# Patient Record
Sex: MALE | Race: ASIAN | NOT HISPANIC OR LATINO | URBAN - METROPOLITAN AREA
[De-identification: names, ages, dates, MRNs, and addresses within clinical notes are randomized per-mention and may not be internally consistent; named-entity substitution may affect disease eponyms.]

---

## 2020-06-28 ENCOUNTER — INPATIENT (INPATIENT)
Facility: HOSPITAL | Age: 65
LOS: 4 days | Discharge: HOME | End: 2020-07-03
Attending: STUDENT IN AN ORGANIZED HEALTH CARE EDUCATION/TRAINING PROGRAM | Admitting: STUDENT IN AN ORGANIZED HEALTH CARE EDUCATION/TRAINING PROGRAM
Payer: COMMERCIAL

## 2020-06-28 VITALS
TEMPERATURE: 99 F | HEART RATE: 62 BPM | SYSTOLIC BLOOD PRESSURE: 120 MMHG | OXYGEN SATURATION: 98 % | RESPIRATION RATE: 16 BRPM | DIASTOLIC BLOOD PRESSURE: 58 MMHG
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
## 2020-06-28 DIAGNOSIS — S06.5X0A TRAUMATIC SUBDURAL HEMORRHAGE WITHOUT LOSS OF CONSCIOUSNESS, INITIAL ENCOUNTER: ICD-10-CM

## 2020-06-28 DIAGNOSIS — K21.9 GASTRO-ESOPHAGEAL REFLUX DISEASE WITHOUT ESOPHAGITIS: ICD-10-CM

## 2020-06-28 DIAGNOSIS — G81.94 HEMIPLEGIA, UNSPECIFIED AFFECTING LEFT NONDOMINANT SIDE: ICD-10-CM

## 2020-06-28 DIAGNOSIS — R00.1 BRADYCARDIA, UNSPECIFIED: ICD-10-CM

## 2020-06-28 DIAGNOSIS — F17.210 NICOTINE DEPENDENCE, CIGARETTES, UNCOMPLICATED: ICD-10-CM

## 2020-06-28 DIAGNOSIS — G89.18 OTHER ACUTE POSTPROCEDURAL PAIN: ICD-10-CM

## 2020-06-28 DIAGNOSIS — G93.89 OTHER SPECIFIED DISORDERS OF BRAIN: ICD-10-CM

## 2020-06-28 DIAGNOSIS — S06.2X0A DIFFUSE TRAUMATIC BRAIN INJURY WITHOUT LOSS OF CONSCIOUSNESS, INITIAL ENCOUNTER: ICD-10-CM
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
## 2020-06-28 DIAGNOSIS — R63.6 UNDERWEIGHT: ICD-10-CM

## 2020-06-28 DIAGNOSIS — J98.11 ATELECTASIS: ICD-10-CM

## 2020-06-28 DIAGNOSIS — R27.0 ATAXIA, UNSPECIFIED: ICD-10-CM

## 2020-06-28 LAB
ALBUMIN SERPL ELPH-MCNC: 4.2 G/DL — SIGNIFICANT CHANGE UP (ref 3.5–5.2)
ALP SERPL-CCNC: 77 U/L — SIGNIFICANT CHANGE UP (ref 30–115)
ALT FLD-CCNC: 8 U/L — SIGNIFICANT CHANGE UP (ref 0–41)
ANION GAP SERPL CALC-SCNC: 12 MMOL/L — SIGNIFICANT CHANGE UP (ref 7–14)
APPEARANCE UR: CLEAR — SIGNIFICANT CHANGE UP
APTT BLD: 30.8 SEC — SIGNIFICANT CHANGE UP (ref 27–39.2)
AST SERPL-CCNC: 18 U/L — SIGNIFICANT CHANGE UP (ref 0–41)
BACTERIA # UR AUTO: 0 — SIGNIFICANT CHANGE UP
BASOPHILS # BLD AUTO: 0.02 K/UL — SIGNIFICANT CHANGE UP (ref 0–0.2)
BASOPHILS NFR BLD AUTO: 0.3 % — SIGNIFICANT CHANGE UP (ref 0–1)
BILIRUB SERPL-MCNC: 0.4 MG/DL — SIGNIFICANT CHANGE UP (ref 0.2–1.2)
BILIRUB UR-MCNC: NEGATIVE — SIGNIFICANT CHANGE UP
BLD GP AB SCN SERPL QL: SIGNIFICANT CHANGE UP
BUN SERPL-MCNC: 22 MG/DL — HIGH (ref 10–20)
CALCIUM SERPL-MCNC: 9.1 MG/DL — SIGNIFICANT CHANGE UP (ref 8.5–10.1)
CHLORIDE SERPL-SCNC: 106 MMOL/L — SIGNIFICANT CHANGE UP (ref 98–110)
CO2 SERPL-SCNC: 24 MMOL/L — SIGNIFICANT CHANGE UP (ref 17–32)
COLOR SPEC: SIGNIFICANT CHANGE UP
CREAT SERPL-MCNC: 0.8 MG/DL — SIGNIFICANT CHANGE UP (ref 0.7–1.5)
DIFF PNL FLD: NEGATIVE — SIGNIFICANT CHANGE UP
EOSINOPHIL # BLD AUTO: 0.06 K/UL — SIGNIFICANT CHANGE UP (ref 0–0.7)
EOSINOPHIL NFR BLD AUTO: 0.9 % — SIGNIFICANT CHANGE UP (ref 0–8)
EPI CELLS # UR: 0 /HPF — SIGNIFICANT CHANGE UP (ref 0–5)
GLUCOSE SERPL-MCNC: 109 MG/DL — HIGH (ref 70–99)
GLUCOSE UR QL: NEGATIVE — SIGNIFICANT CHANGE UP
HCT VFR BLD CALC: 43.1 % — SIGNIFICANT CHANGE UP (ref 42–52)
HGB BLD-MCNC: 14.7 G/DL — SIGNIFICANT CHANGE UP (ref 14–18)
HYALINE CASTS # UR AUTO: 0 /LPF — SIGNIFICANT CHANGE UP (ref 0–7)
IMM GRANULOCYTES NFR BLD AUTO: 0.2 % — SIGNIFICANT CHANGE UP (ref 0.1–0.3)
INR BLD: 1 RATIO — SIGNIFICANT CHANGE UP (ref 0.65–1.3)
KETONES UR-MCNC: NEGATIVE — SIGNIFICANT CHANGE UP
LEUKOCYTE ESTERASE UR-ACNC: NEGATIVE — SIGNIFICANT CHANGE UP
LYMPHOCYTES # BLD AUTO: 0.74 K/UL — LOW (ref 1.2–3.4)
LYMPHOCYTES # BLD AUTO: 11.3 % — LOW (ref 20.5–51.1)
MCHC RBC-ENTMCNC: 32.7 PG — HIGH (ref 27–31)
MCHC RBC-ENTMCNC: 34.1 G/DL — SIGNIFICANT CHANGE UP (ref 32–37)
MCV RBC AUTO: 95.8 FL — HIGH (ref 80–94)
MONOCYTES # BLD AUTO: 0.65 K/UL — HIGH (ref 0.1–0.6)
MONOCYTES NFR BLD AUTO: 10 % — HIGH (ref 1.7–9.3)
NEUTROPHILS # BLD AUTO: 5.04 K/UL — SIGNIFICANT CHANGE UP (ref 1.4–6.5)
NEUTROPHILS NFR BLD AUTO: 77.3 % — HIGH (ref 42.2–75.2)
NITRITE UR-MCNC: NEGATIVE — SIGNIFICANT CHANGE UP
NRBC # BLD: 0 /100 WBCS — SIGNIFICANT CHANGE UP (ref 0–0)
PH UR: 7 — SIGNIFICANT CHANGE UP (ref 5–8)
PLATELET # BLD AUTO: 281 K/UL — SIGNIFICANT CHANGE UP (ref 130–400)
POTASSIUM SERPL-MCNC: 4.1 MMOL/L — SIGNIFICANT CHANGE UP (ref 3.5–5)
POTASSIUM SERPL-SCNC: 4.1 MMOL/L — SIGNIFICANT CHANGE UP (ref 3.5–5)
PROT SERPL-MCNC: 6.8 G/DL — SIGNIFICANT CHANGE UP (ref 6–8)
PROT UR-MCNC: ABNORMAL
PROTHROM AB SERPL-ACNC: 11.5 SEC — SIGNIFICANT CHANGE UP (ref 9.95–12.87)
RBC # BLD: 4.5 M/UL — LOW (ref 4.7–6.1)
RBC # FLD: 14.4 % — SIGNIFICANT CHANGE UP (ref 11.5–14.5)
RBC CASTS # UR COMP ASSIST: 2 /HPF — SIGNIFICANT CHANGE UP (ref 0–4)
SARS-COV-2 RNA SPEC QL NAA+PROBE: SIGNIFICANT CHANGE UP
SODIUM SERPL-SCNC: 142 MMOL/L — SIGNIFICANT CHANGE UP (ref 135–146)
SP GR SPEC: >1.05 (ref 1.01–1.02)
TROPONIN T SERPL-MCNC: <0.01 NG/ML — SIGNIFICANT CHANGE UP
UROBILINOGEN FLD QL: SIGNIFICANT CHANGE UP
WBC # BLD: 6.52 K/UL — SIGNIFICANT CHANGE UP (ref 4.8–10.8)
WBC # FLD AUTO: 6.52 K/UL — SIGNIFICANT CHANGE UP (ref 4.8–10.8)
WBC UR QL: 0 /HPF — SIGNIFICANT CHANGE UP (ref 0–5)

## 2020-06-28 PROCEDURE — 70450 CT HEAD/BRAIN W/O DYE: CPT | Mod: 26,59

## 2020-06-28 PROCEDURE — 93010 ELECTROCARDIOGRAM REPORT: CPT | Mod: 77

## 2020-06-28 PROCEDURE — 93010 ELECTROCARDIOGRAM REPORT: CPT

## 2020-06-28 PROCEDURE — 70498 CT ANGIOGRAPHY NECK: CPT | Mod: 26

## 2020-06-28 PROCEDURE — 71045 X-RAY EXAM CHEST 1 VIEW: CPT | Mod: 26

## 2020-06-28 PROCEDURE — 70496 CT ANGIOGRAPHY HEAD: CPT | Mod: 26

## 2020-06-28 PROCEDURE — 99291 CRITICAL CARE FIRST HOUR: CPT

## 2020-06-28 RX ORDER — PANTOPRAZOLE SODIUM 20 MG/1
40 TABLET, DELAYED RELEASE ORAL
Refills: 0 | Status: DISCONTINUED | OUTPATIENT
Start: 2020-06-28 | End: 2020-06-29

## 2020-06-28 RX ORDER — CHLORHEXIDINE GLUCONATE 213 G/1000ML
1 SOLUTION TOPICAL
Refills: 0 | Status: DISCONTINUED | OUTPATIENT
Start: 2020-06-28 | End: 2020-06-29

## 2020-06-28 RX ORDER — LEVETIRACETAM 250 MG/1
750 TABLET, FILM COATED ORAL EVERY 12 HOURS
Refills: 0 | Status: DISCONTINUED | OUTPATIENT
Start: 2020-06-28 | End: 2020-06-29

## 2020-06-28 RX ADMIN — LEVETIRACETAM 400 MILLIGRAM(S): 250 TABLET, FILM COATED ORAL at 19:33

## 2020-06-28 NOTE — ED PROVIDER NOTE - OBJECTIVE STATEMENT
64yo M with PMH of GERD and tobacco abuse presenting to ED with about 2-3 days of L sided weakness associated with unbalanced/ataxic ambulation and slowed speech, worsened today, not on AC. 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 Had a mild occipital/posterior HA although no pain currently. No f/c, vision changes, neck pain/stiffness, dizziness, syncope, recent sickness, CP, SOB, abdominal pain, extremity pain, or paresthesias. Son states patient has also had weight loss and decreased appetite the last few days. No alcohol/drug use.

## 2020-06-28 NOTE — CONSULT NOTE ADULT - SUBJECTIVE AND OBJECTIVE BOX
HISTORY OF PRESENT ILLNESS: 66 y/o Cantonese speaking w pmhx of GERD, (+) smoker, no anticoagulant use presents with ~ 2- 3 days of L sided weakness associated with unbalanced ambulation and slowed speech, worsened today. Son also reports pt has had weight loss and decreased appetite. No fever, chills, n/v, cp, sob, pleuritic cp, palpitations, diaphoresis, cough, tinnitus, ear pain, hearing loss, neck pain/stiffness, back pain, photophobia/phonophobia, blurry vision/visual changes, abd pain, diarrhea, constipation, melena/brbpr, urinary symptoms, numbness/tingling, HA, Lh/dizziness, syncope, sick contacts, recent travel or rash. Never had anything like this before, no history of trauma or high blood pressure.     PAST MEDICAL & SURGICAL HISTORY:    FAMILY HISTORY:    Allergies : No Known Allergies    Intolerances      REVIEW OF SYSTEMS : all systems reviewed negative   MEDICATIONS:  Antibiotics:    Neuro:  levETIRAcetam  IVPB 750 milliGRAM(s) IV Intermittent every 12 hours    Anticoagulation: none     Vital Signs Last 24 Hrs  T(C): 37.1 (28 Jun 2020 17:03), Max: 37.1 (28 Jun 2020 17:03)  T(F): 98.8 (28 Jun 2020 17:03), Max: 98.8 (28 Jun 2020 17:03)  HR: 54 (28 Jun 2020 18:27) (54 - 62)  BP: 128/69 (28 Jun 2020 18:27) (120/58 - 128/69)  BP(mean): --  RR: 16 (28 Jun 2020 18:27) (16 - 16)  SpO2: 98% (28 Jun 2020 18:27) (98% - 98%)    Physical Exam :  General : Pt sitting upright in ED with son translating.   A&O x 3 oriented to person place and time   Tongue midline  Facial features symmetric, No droop  Cantonese speaking, Speech clear no slur   Pt speaking in full sentences   Follows all commands   Occular :   Right  pupil slightly larger than left  Right pupil more sluggish than left   EOMI, no gaze palsy   VA intact no diplopia as per pt   Motor :   MAEx4 b/l  strength 5-/5 LUE & LLE  strength 5/5 RUE & RLE   Shoulder shrug intact   Sensory :  Intact bilaterally UE & LE & face   Cerebellar :  AMANDA intact   Finger to nose intact b/l  Pronator Drift : no drift noted on exam     LABS:                        14.7   6.52  )-----------( 281      ( 28 Jun 2020 17:40 )             43.1     06-28    142  |  106  |  22<H>  ----------------------------<  109<H>  4.1   |  24  |  0.8    Ca    9.1      28 Jun 2020 17:40    TPro  6.8  /  Alb  4.2  /  TBili  0.4  /  DBili  x   /  AST  18  /  ALT  8   /  AlkPhos  77  06-28    PT/INR - ( 28 Jun 2020 17:40 )   PT: 11.50 sec;   INR: 1.00 ratio         PTT - ( 28 Jun 2020 17:40 )  PTT:30.8 sec    RADIOLOGY & ADDITIONAL STUDIES:   < from: CT Head No Cont (06.28.20 @ 17:55)  Findings:    Large concave isodensity (measuring approximately 2.5 cm in diameter) in the right subdural region with compression of the right brain and lateral ventricle, causing approximately 1.6 cm midline shift to the left (series 3 image 17). There are focal hyperdensities within the concave isodensity, likely representing acute on subacute hemorrhage.      Assessment: 66 yo Cantonese speaking man no AC use presents to ED with stroke symptoms x 2-3 days. Ct shows 2.5 cm SDH with compression of R brain and 1.6 cm MLS. Good PE, pt reports HA that has resolved and slight left sided weakness.     Plan:  - Scan reviewed with attending   - admit to ICU for q1 neuro checks   - Start Keppra 750 BID   - repeat CT in am  - OR tomorrow for R mishel hole possible crani for evac of SDH   - hold all AC   - no dextrose products   - STAT head CT if AMS   - BP goal 140 - 150 SBP   - repeat labs in AM HISTORY OF PRESENT ILLNESS: 66 y/o Cantonese speaking w pmhx of GERD, (+) smoker, no anticoagulant use presents with ~ 2- 3 days of L sided weakness associated with unbalanced ambulation and slowed speech, worsened today. Son also reports pt has had weight loss and decreased appetite. No fever, chills, n/v, cp, sob, pleuritic cp, palpitations, diaphoresis, cough, tinnitus, ear pain, hearing loss, neck pain/stiffness, back pain, photophobia/phonophobia, blurry vision/visual changes, abd pain, diarrhea, constipation, melena/brbpr, urinary symptoms, numbness/tingling, HA, Lh/dizziness, syncope, sick contacts, recent travel or rash. Never had anything like this before, no history of trauma or high blood pressure.     PAST MEDICAL & SURGICAL HISTORY:    FAMILY HISTORY:    Allergies : No Known Allergies    REVIEW OF SYSTEMS : all other systems reviewed negative   MEDICATIONS:  Antibiotics:    Neuro:  levETIRAcetam  IVPB 750 milliGRAM(s) IV Intermittent every 12 hours    Anticoagulation: none     Vital Signs Last 24 Hrs  T(C): 37.1 (28 Jun 2020 17:03), Max: 37.1 (28 Jun 2020 17:03)  T(F): 98.8 (28 Jun 2020 17:03), Max: 98.8 (28 Jun 2020 17:03)  HR: 54 (28 Jun 2020 18:27) (54 - 62)  BP: 128/69 (28 Jun 2020 18:27) (120/58 - 128/69)  BP(mean): --  RR: 16 (28 Jun 2020 18:27) (16 - 16)  SpO2: 98% (28 Jun 2020 18:27) (98% - 98%)    Physical Exam :  General : Pt sitting upright in ED with son translating.   A&O x 3 oriented to person place and time   Tongue midline  Facial features symmetric, No droop  Cantonese speaking, Speech clear no slur   Pt speaking in full sentences   Follows all commands   Occular :   Right  pupil slightly larger than left  Right pupil more sluggish than left   EOMI, no gaze palsy   VA intact no diplopia as per pt   Motor :   MAEx4 b/l  strength 5-/5 LUE & LLE  strength 5/5 RUE & RLE   Shoulder shrug intact   Sensory :  Intact bilaterally UE & LE & face   Cerebellar :  AMANDA intact   Finger to nose intact b/l  Pronator Drift : no drift noted on exam     LABS:                        14.7   6.52  )-----------( 281      ( 28 Jun 2020 17:40 )             43.1     06-28    142  |  106  |  22<H>  ----------------------------<  109<H>  4.1   |  24  |  0.8    Ca    9.1      28 Jun 2020 17:40    TPro  6.8  /  Alb  4.2  /  TBili  0.4  /  DBili  x   /  AST  18  /  ALT  8   /  AlkPhos  77  06-28    PT/INR - ( 28 Jun 2020 17:40 )   PT: 11.50 sec;   INR: 1.00 ratio         PTT - ( 28 Jun 2020 17:40 )  PTT:30.8 sec    RADIOLOGY & ADDITIONAL STUDIES:   < from: CT Head No Cont (06.28.20 @ 17:55)  Findings:    Large concave isodensity (measuring approximately 2.5 cm in diameter) in the right subdural region with compression of the right brain and lateral ventricle, causing approximately 1.6 cm midline shift to the left (series 3 image 17). There are focal hyperdensities within the concave isodensity, likely representing acute on subacute hemorrhage.    Assessment: 64 yo Cantonese speaking man no AC use presents to ED with stroke symptoms x 2-3 days. Ct shows 2.5 cm SDH with compression of R brain and 1.6 cm MLS. Good PE, pt reports HA that has resolved and slight left sided weakness.     Plan:  - Scan reviewed with attending   - admit to ICU for q1 neuro checks   - Start Keppra 750 BID   - repeat CT in am  - OR tomorrow for R mishel hole possible crani for evac of SDH ; pt will need covid swab prior to OR   - hold all AC   - no dextrose products   - STAT head CT if AMS    - BP goal 140 - 150 SBP   - repeat labs in AM

## 2020-06-28 NOTE — ED ADULT NURSE NOTE - NSIMPLEMENTINTERV_GEN_ALL_ED
Implemented All Fall with Harm Risk Interventions:  [place] to call system. Call bell, personal items and telephone within reach. Instruct patient to call for assistance. Room bathroom lighting operational. Non-slip footwear when patient is off stretcher. Physically safe environment: no spills, clutter or unnecessary equipment. Stretcher in lowest position, wheels locked, appropriate side rails in place. Provide visual cue, wrist band, yellow gown, etc. Monitor gait and stability. Monitor for mental status changes and reorient to person, place, and time. Review medications for side effects contributing to fall risk. Reinforce activity limits and safety measures with patient and family. Provide visual clues: red socks.

## 2020-06-28 NOTE — ED PROVIDER NOTE - NS ED ROS FT
Constitutional:  No fevers or chills.  Eyes:  No visual changes, eye pain, or discharge.  ENT:  No hearing changes. No sore throat.  Neck:  No neck pain or stiffness.  Cardiac:  No CP or edema.  Resp:  No cough or SOB.   GI:  No nausea, vomiting, diarrhea, or abdominal pain.  :  No dysuria, frequency, or hematuria.  MSK:  No myalgias or joint pain/swelling.  Neuro:  No current headache/dizziness. +Weakness.  Skin:  No skin rash.

## 2020-06-28 NOTE — H&P ADULT - HISTORY OF PRESENT ILLNESS
This is a 65 year old male Cantonese speaking with PMHx of GERD not on any medications who presented with left sided weakness associated with off balanced gait for 2-3 days. The son also reports pt has had weight loss and decreased appetite. Of note in talking with the son the patient admits to hitting his head about one week ago but reported no loss of conscious with it. On the day of presentation he was having worsening left sided weakness with ataxia and unbalanced gait.     In the ED /58, HR 62, T98.8, sat 98% on room air. Initial NIHSS reported 2 but was 0 on my examination. CT head noted for 2.5cm right subdural isodensity representing subacute subdural hematoma with 1.6cm midline shift to the left. Noted acute on subacute hemorrhage with noted focal hyerdensities within the subdural collection. Evaluated by Neurosurgery who advised plan for mishel hole tomorrow with possible craniotomy and ICU monitoring for q1 neurochecks.

## 2020-06-28 NOTE — H&P ADULT - NSHPLABSRESULTS_GEN_ALL_CORE
14.7   6.52  )-----------( 281      ( 28 Jun 2020 17:40 )             43.1     06-28    142  |  106  |  22<H>  ----------------------------<  109<H>  4.1   |  24  |  0.8    Ca    9.1      28 Jun 2020 17:40    TPro  6.8  /  Alb  4.2  /  TBili  0.4  /  DBili  x   /  AST  18  /  ALT  8   /  AlkPhos  77  06-28    PT/INR - ( 28 Jun 2020 17:40 )   PT: 11.50 sec;   INR: 1.00 ratio       PTT - ( 28 Jun 2020 17:40 )  PTT:30.8 sec    CARDIAC MARKERS ( 28 Jun 2020 17:40 )  x     / <0.01 ng/mL / x     / x     / x        CAPILLARY BLOOD GLUCOSE  POCT Blood Glucose.: 108 mg/dL (28 Jun 2020 17:31)    CT Head No Cont (06.28.20 @ 17:55)    IMPRESSION:   2.5 cm right subdural isodensity, representing subacute subdural hematoma with 1.6 cm midline shift to the left. Focal hyperdensities within the subdural collection, likely representing acute on subacute hemorrhage. A call back request was submitted    CT Angio Head w/ IV Cont (06.28.20 @ 18:10)    IMPRESSION:   No CT evidence of acute intracerebral large vessel filling defect. No intracerebral artery aneurysms.    Partially imaged bilateral lung centrilobular and paraseptal emphysematous change    EKG: NSR HR 50s

## 2020-06-28 NOTE — ED PROVIDER NOTE - CARE PLAN
Assessment and plan of treatment:	Plan: Monitor, FS, CT head, EKG, CXR, labs, neurology consult, reassess. Principal Discharge DX:	Subdural hematoma  Assessment and plan of treatment:	Plan: Monitor, FS, CT head, EKG, CXR, labs, neurology consult, reassess.  Secondary Diagnosis:	Weakness  Secondary Diagnosis:	Ataxia

## 2020-06-28 NOTE — H&P ADULT - NSHPREVIEWOFSYSTEMS_GEN_ALL_CORE
General: No fevers, chills, weight changes	  Skin/Breast: No skin rashes or wounds  Ophthalmologic: No blurry vision, double vision, recent changes in vision  ENMT: No difficulty hearing, ringing in ears, nasal discharge, throat pain, difficulty swallowing  Respiratory and Thorax: No coughing, wheezing, shortness of breath  Cardiovascular: No chest pain, palpitations  Gastrointestinal: No abdominal pain, constipation, diarrhea, nausea, vomiting  Genitourinary: No dysuria, polyuria, pyuria, hematuria  Musculoskeletal: No muscle aches or joint aches  Neurological: No numbness or tingling  Psychiatric: Regular mood  Hematology/Lymphatics: No easy bruising	  Endocrine: No hot or cold intolerance

## 2020-06-28 NOTE — ED ADULT NURSE NOTE - OBJECTIVE STATEMENT
Patient c/o left sided weakness, abnormal gait and weakness x 3 days. Patient brought in by family for further evaluation. Patient A&Ox3, PERRL. No s/s of distress noted. Left sided leg weakness noted. Denies nausea/vomiting/fever/chills/SOB/CP at this time.

## 2020-06-28 NOTE — ED PROVIDER NOTE - ATTENDING CONTRIBUTION TO CARE
Son at bedside translating as pt Cantonese speaking as pt prefers this.   66 y/o w pmhx of GRD, tobacco use, presents with ~ 2- 3 days of L sided weakness associated with unbalanced ambulation and slowed speech, worsened today. 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 not on AC. son also reports pt has had weight loss and decreased appetite. son visits on weekends and when he came this weekend brought him to ed. no fever, chills, n/v, cp, sob, pleuritic cp, palpitations, diaphoresis, cough, tinnitus, ear pain, hearing loss, neck pain/stiffness, back pain, photophobia/phonophobia, blurry vision/visual changes, abd pain, diarrhea, constipation, melena/brbpr, urinary symptoms, numbness/tingling, HA, Lh/dizziness, syncope, sick contacts, recent travel or rash. never had anything like this before.     on exam:   Constitutional: male pt sitting on stretcher in nad.  Skin: no rash, no signs of trauma:  HEENT: PERRL, EOM intact, no nystagmus. mmm. no tongue deviation.   NECK and BACK: neck supple, no spinous ttp to neck or back, FROM, no palpable shelves or step offs, no meningeal signs.  CARDIO: regular rate, radial pulses 2/4 b/l, dp and pt pulses 2/4 b/l.  Lungs: Ctabl w/ breath sounds present b/l, no wheezing or crackles, no accessory muscle use, no tachypnea, no stridor  ABD: BS present throughout all 4 quadrants, abd soft, nd, nt, no rebound tenderness or guarding, no cvat,;  EXT: FROM of upper and lower ext, mild drift to LUE and LLE, does not hit the bed, no calf pain/swelling/erythema.  NEURO: AAOx3. When speaking pt is able to understand and answer questions but is delayed and slowed in responding. Motor 4/5 and 3/5 to LUE and LLE respectfully. Motor 5/5 to RUE and RLE. Sensation intact throughout upper and lower ext. CN II-XII intact. No facial droop or slurring of speech. no dysmetria w/ ftn or rapid alternating fine movements. NIH 1. Son at bedside translating as pt Cantonese speaking as pt prefers this.   66 y/o w pmhx of GERD, (+) smoker, presents with ~ 2- 3 days of L sided weakness associated with unbalanced ambulation and slowed speech, worsened today. 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 not on AC. son also reports pt has had weight loss and decreased appetite. son visits on weekends and when he came this weekend brought him to ed. no fever, chills, n/v, cp, sob, pleuritic cp, palpitations, diaphoresis, cough, tinnitus, ear pain, hearing loss, neck pain/stiffness, back pain, photophobia/phonophobia, blurry vision/visual changes, abd pain, diarrhea, constipation, melena/brbpr, urinary symptoms, numbness/tingling, HA, Lh/dizziness, syncope, sick contacts, recent travel or rash. never had anything like this before.     on exam:   Constitutional: male pt sitting on stretcher in nad.  Skin: no rash, no signs of trauma:  HEENT: PERRL, EOM intact, no nystagmus. mmm. no tongue deviation.   NECK and BACK: neck supple, no spinous ttp to neck or back, FROM, no palpable shelves or step offs, no meningeal signs.  CARDIO: regular rate, radial pulses 2/4 b/l, dp and pt pulses 2/4 b/l.  Lungs: Ctabl w/ breath sounds present b/l, no wheezing or crackles, no accessory muscle use, no tachypnea, no stridor  ABD: BS present throughout all 4 quadrants, abd soft, nd, nt, no rebound tenderness or guarding, no cvat,;  EXT: FROM of upper and lower ext, mild drift to LUE and LLE, does not hit the bed, no calf pain/swelling/erythema.  NEURO: AAOx3. When speaking pt is able to understand and answer questions but is delayed and slowed in responding. Motor 4/5 and 3/5 to LUE and LLE respectfully. Motor 5/5 to RUE and RLE. Sensation intact throughout upper and lower ext. CN II-XII intact. No facial droop or slurring of speech. no dysmetria w/ ftn or rapid alternating fine movements. NIH 1.

## 2020-06-28 NOTE — H&P ADULT - NSHPPHYSICALEXAM_GEN_ALL_CORE
T(C): 36.9 (06-28-20 @ 19:35), Max: 37.1 (06-28-20 @ 17:03)  HR: 48 (06-28-20 @ 19:35) (48 - 62)  BP: 144/67 (06-28-20 @ 19:35) (120/58 - 144/67)  RR: 18 (06-28-20 @ 19:35) (16 - 18)  SpO2: 99% (06-28-20 @ 19:35) (98% - 99%)    PHYSICAL EXAM:  GENERAL: NAD, well-developed, elderly appearing male   HEAD:  Atraumatic, Normocephalic  EYES: EOMI, PERRLA, conjunctiva and sclera clear  ENT:No nasal obstruction or discharge. No tonsillar exudate, swelling or erythema.  NECK: Supple, No JVD  CHEST/LUNG: Clear to auscultation bilaterally; No wheeze  HEART: Regular rate and rhythm; No murmurs, rubs, or gallops  ABDOMEN: Soft, Nontender, Nondistended; Bowel sounds present  EXTREMITIES:  2+ Peripheral Pulses, No clubbing, cyanosis, or edema  PSYCH: AAOx3  NEUROLOGY: non-focal, NIHSS 0  SKIN: No rashes or lesions

## 2020-06-28 NOTE — ED PROVIDER NOTE - CLINICAL SUMMARY MEDICAL DECISION MAKING FREE TEXT BOX
pt and son aware of all labs and imaging, neurosurgery evaluated pt, keppra ordered as per recommendations, ICU aware of pt, approved for ICU, pt on monitor, understands admission.

## 2020-06-28 NOTE — ED PROVIDER NOTE - PHYSICAL EXAMINATION
PHYSICAL EXAM: I have reviewed current vital signs.  GENERAL: NAD, well-nourished; well-developed.  HEAD:  Normocephalic, atraumatic.  EYES: EOMI, PERRL, conjunctiva and sclera clear.  ENT: MMM, no erythema/exudates.  NECK: Supple, FROM, no rigidity.  CHEST/LUNG: Clear to auscultation bilaterally; no wheezes, rales, or rhonchi.  HEART: Regular rate and rhythm, normal S1 and S2; no murmurs, rubs, or gallops.  ABDOMEN: Soft, nontender, nondistended.  EXTREMITIES:  2+ peripheral pulses; FROM.  NEUROLOGY: A&O x 3. Motor 5/5. Sensory intact. No focal neurological deficits. CN II - XII intact. NIHSS 1. LUE 4/5 strength, LLE 3/5 strength, RUE/RLE- 5/5 strength. No pronator drift or facial droop.  SKIN: Warm and dry.

## 2020-06-28 NOTE — H&P ADULT - ASSESSMENT
This is a 65 year old male Cantonese speaking with PMHx of GERD not on any medications who presented with left sided weakness associated with off balanced gait for 2-3 days.    #Left sided weakness and ataxia secondary to Subdural hematoma   - Noted 2.5cm SDH on CT with compression of Right brain and 1.6cm midline shift  - NIHSS 0 right now   - Neurosurgery following; plan for OR tomorrow for R mishel hole and possible crani for evacuation of SDH  - CTH to be repeated in the AM  - NPO at midnight  - Keppra 750mg BID   - NO anticoagulation   - NO dextrose containing products  - BP goal: -150  - COVID-19 low suspicion; swab sent   - [facility] at 30 degress  - Neuro checks q1 hour    #GERD  - Not on any medications at home  - Start on Prtotonix    Activity: As tolerated  Diet: NPO for procedure  DVT ppx: Sequentials  GI ppx: Protonix  Code Status: Full Code  DISPO: From home; admit to ICU This is a 65 year old male Cantonese speaking with PMHx of GERD not on any medications who presented with left sided weakness associated with off balanced gait for 2-3 days.    #Left sided weakness and ataxia secondary to Subdural hematoma   - Noted 2.5cm SDH on CT with compression of Right brain and 1.6cm midline shift  - NIHSS 0 right now   - Neurosurgery following; plan for OR tomorrow for R mishel hole and possible crani for evacuation of SDH  - CTH to be repeated in the AM or sooner if patient's mental status changes  - NPO at midnight  - Keppra 750mg BID   - NO anticoagulation   - NO dextrose containing products  - BP goal: -150  - COVID-19 low suspicion; swab sent   - HOB at 30 degress  - Neuro checks q1 hour    #GERD  - Not on any medications at home  - Start on Prtotonix    Activity: As tolerated  Diet: NPO for procedure  DVT ppx: Sequentials  GI ppx: Protonix  Code Status: Full Code  DISPO: From home; admit to ICU

## 2020-06-28 NOTE — PROGRESS NOTE ADULT - SUBJECTIVE AND OBJECTIVE BOX
Preoperative Note   Provider Specialty:  Neurosurgery.    HISTORY OF PRESENT ILLNESS: 66 y/o Cantonese speaking w pmhx of GERD, (+) smoker, no anticoagulant use presents with ~ 2- 3 days of L sided weakness associated with unbalanced ambulation and slowed speech, worsened today. Son also reports pt has had weight loss and decreased appetite. No fever, chills, n/v, cp, sob, pleuritic cp, palpitations, diaphoresis, cough, tinnitus, ear pain, hearing loss, neck pain/stiffness, back pain, photophobia/phonophobia, blurry vision/visual changes, abd pain, diarrhea, constipation, melena/brbpr, urinary symptoms, numbness/tingling, HA, Lh/dizziness, syncope, sick contacts, recent travel or rash. Never had anything like this before, no history of trauma or high blood pressure.     PAST MEDICAL & SURGICAL HISTORY: GERD     FAMILY HISTORY: no known family history   Allergies : No Known Allergies    REVIEW OF SYSTEMS : all other systems reviewed negative   MEDICATIONS:  Antibiotics:    Neuro:  levETIRAcetam  IVPB 750 milliGRAM(s) IV Intermittent every 12 hours    Anticoagulation: none     Vital Signs Last 24 Hrs  T(C): 37.1 (28 Jun 2020 17:03), Max: 37.1 (28 Jun 2020 17:03)  T(F): 98.8 (28 Jun 2020 17:03), Max: 98.8 (28 Jun 2020 17:03)  HR: 54 (28 Jun 2020 18:27) (54 - 62)  BP: 128/69 (28 Jun 2020 18:27) (120/58 - 128/69)  BP(mean): --  RR: 16 (28 Jun 2020 18:27) (16 - 16)  SpO2: 98% (28 Jun 2020 18:27) (98% - 98%)    Physical Exam :  General : Pt sitting upright in ED with son translating.   A&O x 3 oriented to person place and time   Tongue midline  Facial features symmetric, No droop  Cantonese speaking, Speech clear no slur   Pt speaking in full sentences   Follows all commands   Occular :   Right  pupil slightly larger than left  Right pupil more sluggish than left   EOMI, no gaze palsy   VA intact no diplopia as per pt   Motor :   MAEx4 b/l  strength 5-/5 LUE & LLE  strength 5/5 RUE & RLE   Shoulder shrug intact   Sensory :  Intact bilaterally UE & LE & face   Cerebellar :  AMANDA intact   Finger to nose intact b/l  Pronator Drift : no drift noted on exam     LABS:  CBC :                 14.7   6.52  )-----------( 281                43.1     142  |  106  |  22<H>  ----------------------------<  109<H>  4.1   |  24  |  0.8    Coags :  PT: 11.50 sec   INR: 1.00 ratio    PTT:30.8 sec    CXR : pending read     EKG :CARDIAC MARKERS <0.01 ng/mL     Type + Screen : ABO RH Interpretation: B POS    COVID swab : pending     Assessment: 66 yo Cantonese speaking man no AC use presents to ED with stroke symptoms x 2-3 days. Ct shows 2.5 cm SDH with compression of R brain and 1.6 cm MLS. Good PE, pt reports HA that has resolved and slight left sided weakness.     Plan:  - Scan reviewed with attending   -  ICU for q1 neuro checks   - NPO @ midnight   -  Keppra 750 BID   - repeat CT in am  - OR tomorrow for R mishel hole possible crani for evac of SDH ; pt will need covid swab prior to OR   - hold all AC   - no dextrose products   - STAT head CT if AMS    - BP goal 140 - 150 SBP   - repeat labs in AM

## 2020-06-28 NOTE — ED PROVIDER NOTE - PROGRESS NOTE DETAILS
ED Attending MAGDA Davalos  Pt and son aware of current labs and imaging,  repeat exam NIH 2, no change in exam, no current HA, understand CT, HOB elevated, on Monitor, Neurosurgery aware of pt, will come evaluate, critical care lead made aware of moving pt to critical care area of ed as well. Pennie- Dr. Comer approved patient for ICU, attending- Dr. Menjivar. Patient is hemodynamically stable, HOB at 30 degrees, A&Ox3. NSG recommends to give patient Keppra 750mg BID, NPO after midnight, would like repeat CTH in the AM. Attempted to signout to ICU resident however in the middle of a procedure. Signed out to Dr. Tee, ICU resident. Patient's son Mr. Gil Delgadillo's # [phone].

## 2020-06-29 ENCOUNTER — RESULT REVIEW (OUTPATIENT)
Age: 65
End: 2020-06-29

## 2020-06-29 LAB
A1C WITH ESTIMATED AVERAGE GLUCOSE RESULT: 5.6 % — SIGNIFICANT CHANGE UP (ref 4–5.6)
ANION GAP SERPL CALC-SCNC: 10 MMOL/L — SIGNIFICANT CHANGE UP (ref 7–14)
ANION GAP SERPL CALC-SCNC: 11 MMOL/L — SIGNIFICANT CHANGE UP (ref 7–14)
APTT BLD: 37.1 SEC — SIGNIFICANT CHANGE UP (ref 27–39.2)
BASOPHILS # BLD AUTO: 0.02 K/UL — SIGNIFICANT CHANGE UP (ref 0–0.2)
BASOPHILS # BLD AUTO: 0.04 K/UL — SIGNIFICANT CHANGE UP (ref 0–0.2)
BASOPHILS NFR BLD AUTO: 0.2 % — SIGNIFICANT CHANGE UP (ref 0–1)
BASOPHILS NFR BLD AUTO: 0.5 % — SIGNIFICANT CHANGE UP (ref 0–1)
BUN SERPL-MCNC: 19 MG/DL — SIGNIFICANT CHANGE UP (ref 10–20)
BUN SERPL-MCNC: 20 MG/DL — SIGNIFICANT CHANGE UP (ref 10–20)
CALCIUM SERPL-MCNC: 8.8 MG/DL — SIGNIFICANT CHANGE UP (ref 8.5–10.1)
CALCIUM SERPL-MCNC: 9 MG/DL — SIGNIFICANT CHANGE UP (ref 8.5–10.1)
CHLORIDE SERPL-SCNC: 104 MMOL/L — SIGNIFICANT CHANGE UP (ref 98–110)
CHLORIDE SERPL-SCNC: 105 MMOL/L — SIGNIFICANT CHANGE UP (ref 98–110)
CK MB CFR SERPL CALC: 2.2 NG/ML — SIGNIFICANT CHANGE UP (ref 0.6–6.3)
CK MB CFR SERPL CALC: 2.6 NG/ML — SIGNIFICANT CHANGE UP (ref 0.6–6.3)
CK SERPL-CCNC: 90 U/L — SIGNIFICANT CHANGE UP (ref 0–225)
CK SERPL-CCNC: 99 U/L — SIGNIFICANT CHANGE UP (ref 0–225)
CO2 SERPL-SCNC: 24 MMOL/L — SIGNIFICANT CHANGE UP (ref 17–32)
CO2 SERPL-SCNC: 25 MMOL/L — SIGNIFICANT CHANGE UP (ref 17–32)
CREAT SERPL-MCNC: 0.7 MG/DL — SIGNIFICANT CHANGE UP (ref 0.7–1.5)
CREAT SERPL-MCNC: 0.7 MG/DL — SIGNIFICANT CHANGE UP (ref 0.7–1.5)
CULTURE RESULTS: SIGNIFICANT CHANGE UP
EOSINOPHIL # BLD AUTO: 0 K/UL — SIGNIFICANT CHANGE UP (ref 0–0.7)
EOSINOPHIL # BLD AUTO: 0.16 K/UL — SIGNIFICANT CHANGE UP (ref 0–0.7)
EOSINOPHIL NFR BLD AUTO: 0 % — SIGNIFICANT CHANGE UP (ref 0–8)
EOSINOPHIL NFR BLD AUTO: 2.1 % — SIGNIFICANT CHANGE UP (ref 0–8)
ESTIMATED AVERAGE GLUCOSE: 114 MG/DL — SIGNIFICANT CHANGE UP (ref 68–114)
GLUCOSE SERPL-MCNC: 105 MG/DL — HIGH (ref 70–99)
GLUCOSE SERPL-MCNC: 105 MG/DL — HIGH (ref 70–99)
HCT VFR BLD CALC: 40.9 % — LOW (ref 42–52)
HCT VFR BLD CALC: 43.4 % — SIGNIFICANT CHANGE UP (ref 42–52)
HCV AB S/CO SERPL IA: 0.03 COI — SIGNIFICANT CHANGE UP
HCV AB SERPL-IMP: SIGNIFICANT CHANGE UP
HGB BLD-MCNC: 13.9 G/DL — LOW (ref 14–18)
HGB BLD-MCNC: 14.8 G/DL — SIGNIFICANT CHANGE UP (ref 14–18)
IMM GRANULOCYTES NFR BLD AUTO: 0.1 % — SIGNIFICANT CHANGE UP (ref 0.1–0.3)
IMM GRANULOCYTES NFR BLD AUTO: 0.3 % — SIGNIFICANT CHANGE UP (ref 0.1–0.3)
INR BLD: 0.99 RATIO — SIGNIFICANT CHANGE UP (ref 0.65–1.3)
LYMPHOCYTES # BLD AUTO: 0.34 K/UL — LOW (ref 1.2–3.4)
LYMPHOCYTES # BLD AUTO: 1.07 K/UL — LOW (ref 1.2–3.4)
LYMPHOCYTES # BLD AUTO: 14 % — LOW (ref 20.5–51.1)
LYMPHOCYTES # BLD AUTO: 3.7 % — LOW (ref 20.5–51.1)
MAGNESIUM SERPL-MCNC: 2 MG/DL — SIGNIFICANT CHANGE UP (ref 1.8–2.4)
MAGNESIUM SERPL-MCNC: 2.1 MG/DL — SIGNIFICANT CHANGE UP (ref 1.8–2.4)
MCHC RBC-ENTMCNC: 32 PG — HIGH (ref 27–31)
MCHC RBC-ENTMCNC: 32.3 PG — HIGH (ref 27–31)
MCHC RBC-ENTMCNC: 34 G/DL — SIGNIFICANT CHANGE UP (ref 32–37)
MCHC RBC-ENTMCNC: 34.1 G/DL — SIGNIFICANT CHANGE UP (ref 32–37)
MCV RBC AUTO: 94.2 FL — HIGH (ref 80–94)
MCV RBC AUTO: 94.8 FL — HIGH (ref 80–94)
MONOCYTES # BLD AUTO: 0.2 K/UL — SIGNIFICANT CHANGE UP (ref 0.1–0.6)
MONOCYTES # BLD AUTO: 0.72 K/UL — HIGH (ref 0.1–0.6)
MONOCYTES NFR BLD AUTO: 2.2 % — SIGNIFICANT CHANGE UP (ref 1.7–9.3)
MONOCYTES NFR BLD AUTO: 9.4 % — HIGH (ref 1.7–9.3)
NEUTROPHILS # BLD AUTO: 5.64 K/UL — SIGNIFICANT CHANGE UP (ref 1.4–6.5)
NEUTROPHILS # BLD AUTO: 8.61 K/UL — HIGH (ref 1.4–6.5)
NEUTROPHILS NFR BLD AUTO: 73.7 % — SIGNIFICANT CHANGE UP (ref 42.2–75.2)
NEUTROPHILS NFR BLD AUTO: 93.8 % — HIGH (ref 42.2–75.2)
NRBC # BLD: 0 /100 WBCS — SIGNIFICANT CHANGE UP (ref 0–0)
NRBC # BLD: 0 /100 WBCS — SIGNIFICANT CHANGE UP (ref 0–0)
PHOSPHATE SERPL-MCNC: 3.3 MG/DL — SIGNIFICANT CHANGE UP (ref 2.1–4.9)
PLATELET # BLD AUTO: 246 K/UL — SIGNIFICANT CHANGE UP (ref 130–400)
PLATELET # BLD AUTO: 253 K/UL — SIGNIFICANT CHANGE UP (ref 130–400)
POTASSIUM SERPL-MCNC: 3.8 MMOL/L — SIGNIFICANT CHANGE UP (ref 3.5–5)
POTASSIUM SERPL-MCNC: 4.4 MMOL/L — SIGNIFICANT CHANGE UP (ref 3.5–5)
POTASSIUM SERPL-SCNC: 3.8 MMOL/L — SIGNIFICANT CHANGE UP (ref 3.5–5)
POTASSIUM SERPL-SCNC: 4.4 MMOL/L — SIGNIFICANT CHANGE UP (ref 3.5–5)
PROTHROM AB SERPL-ACNC: 11.4 SEC — SIGNIFICANT CHANGE UP (ref 9.95–12.87)
RBC # BLD: 4.34 M/UL — LOW (ref 4.7–6.1)
RBC # BLD: 4.58 M/UL — LOW (ref 4.7–6.1)
RBC # FLD: 14.4 % — SIGNIFICANT CHANGE UP (ref 11.5–14.5)
RBC # FLD: 14.4 % — SIGNIFICANT CHANGE UP (ref 11.5–14.5)
SODIUM SERPL-SCNC: 139 MMOL/L — SIGNIFICANT CHANGE UP (ref 135–146)
SODIUM SERPL-SCNC: 140 MMOL/L — SIGNIFICANT CHANGE UP (ref 135–146)
SPECIMEN SOURCE: SIGNIFICANT CHANGE UP
TROPONIN T SERPL-MCNC: <0.01 NG/ML — SIGNIFICANT CHANGE UP
TROPONIN T SERPL-MCNC: <0.01 NG/ML — SIGNIFICANT CHANGE UP
WBC # BLD: 7.65 K/UL — SIGNIFICANT CHANGE UP (ref 4.8–10.8)
WBC # BLD: 9.18 K/UL — SIGNIFICANT CHANGE UP (ref 4.8–10.8)
WBC # FLD AUTO: 7.65 K/UL — SIGNIFICANT CHANGE UP (ref 4.8–10.8)
WBC # FLD AUTO: 9.18 K/UL — SIGNIFICANT CHANGE UP (ref 4.8–10.8)

## 2020-06-29 PROCEDURE — 88304 TISSUE EXAM BY PATHOLOGIST: CPT | Mod: 26

## 2020-06-29 PROCEDURE — 99291 CRITICAL CARE FIRST HOUR: CPT

## 2020-06-29 PROCEDURE — 61154 BURR HOLE W/EVAC&/DRG HMTMA: CPT | Mod: RT

## 2020-06-29 PROCEDURE — 71045 X-RAY EXAM CHEST 1 VIEW: CPT | Mod: 26

## 2020-06-29 RX ORDER — ATROPINE SULFATE 0.1 MG/ML
0.5 SYRINGE (ML) INJECTION ONCE
Refills: 0 | Status: DISCONTINUED | OUTPATIENT
Start: 2020-06-29 | End: 2020-06-29

## 2020-06-29 RX ORDER — LABETALOL HCL 100 MG
10 TABLET ORAL EVERY 6 HOURS
Refills: 0 | Status: DISCONTINUED | OUTPATIENT
Start: 2020-06-29 | End: 2020-06-30

## 2020-06-29 RX ORDER — PANTOPRAZOLE SODIUM 20 MG/1
40 TABLET, DELAYED RELEASE ORAL
Refills: 0 | Status: DISCONTINUED | OUTPATIENT
Start: 2020-06-30 | End: 2020-07-03

## 2020-06-29 RX ORDER — FENTANYL CITRATE 50 UG/ML
25 INJECTION INTRAVENOUS
Refills: 0 | Status: DISCONTINUED | OUTPATIENT
Start: 2020-06-29 | End: 2020-06-29

## 2020-06-29 RX ORDER — LEVETIRACETAM 250 MG/1
750 TABLET, FILM COATED ORAL EVERY 12 HOURS
Refills: 0 | Status: DISCONTINUED | OUTPATIENT
Start: 2020-06-29 | End: 2020-07-03

## 2020-06-29 RX ORDER — CHLORHEXIDINE GLUCONATE 213 G/1000ML
1 SOLUTION TOPICAL
Refills: 0 | Status: DISCONTINUED | OUTPATIENT
Start: 2020-06-29 | End: 2020-07-03

## 2020-06-29 RX ORDER — SODIUM CHLORIDE 9 MG/ML
1000 INJECTION, SOLUTION INTRAVENOUS
Refills: 0 | Status: DISCONTINUED | OUTPATIENT
Start: 2020-06-29 | End: 2020-06-30

## 2020-06-29 RX ORDER — SENNA PLUS 8.6 MG/1
2 TABLET ORAL AT BEDTIME
Refills: 0 | Status: DISCONTINUED | OUTPATIENT
Start: 2020-06-29 | End: 2020-07-03

## 2020-06-29 RX ORDER — ONDANSETRON 8 MG/1
4 TABLET, FILM COATED ORAL ONCE
Refills: 0 | Status: COMPLETED | OUTPATIENT
Start: 2020-06-29 | End: 2020-06-29

## 2020-06-29 RX ORDER — ONDANSETRON 8 MG/1
4 TABLET, FILM COATED ORAL ONCE
Refills: 0 | Status: DISCONTINUED | OUTPATIENT
Start: 2020-06-29 | End: 2020-06-29

## 2020-06-29 RX ORDER — SODIUM CHLORIDE 9 MG/ML
1000 INJECTION, SOLUTION INTRAVENOUS
Refills: 0 | Status: DISCONTINUED | OUTPATIENT
Start: 2020-06-29 | End: 2020-06-29

## 2020-06-29 RX ORDER — LEVETIRACETAM 250 MG/1
750 TABLET, FILM COATED ORAL EVERY 12 HOURS
Refills: 0 | Status: DISCONTINUED | OUTPATIENT
Start: 2020-06-29 | End: 2020-06-29

## 2020-06-29 RX ORDER — ACETAMINOPHEN 500 MG
650 TABLET ORAL EVERY 6 HOURS
Refills: 0 | Status: DISCONTINUED | OUTPATIENT
Start: 2020-06-29 | End: 2020-07-03

## 2020-06-29 RX ORDER — OXYCODONE HYDROCHLORIDE 5 MG/1
5 TABLET ORAL EVERY 6 HOURS
Refills: 0 | Status: DISCONTINUED | OUTPATIENT
Start: 2020-06-29 | End: 2020-07-03

## 2020-06-29 RX ORDER — CEFAZOLIN SODIUM 1 G
2000 VIAL (EA) INJECTION EVERY 8 HOURS
Refills: 0 | Status: COMPLETED | OUTPATIENT
Start: 2020-06-29 | End: 2020-06-30

## 2020-06-29 RX ORDER — OXYCODONE AND ACETAMINOPHEN 5; 325 MG/1; MG/1
1 TABLET ORAL EVERY 4 HOURS
Refills: 0 | Status: DISCONTINUED | OUTPATIENT
Start: 2020-06-29 | End: 2020-06-29

## 2020-06-29 RX ADMIN — Medication 100 MILLIGRAM(S): at 17:35

## 2020-06-29 RX ADMIN — CHLORHEXIDINE GLUCONATE 1 APPLICATION(S): 213 SOLUTION TOPICAL at 06:07

## 2020-06-29 RX ADMIN — Medication 650 MILLIGRAM(S): at 18:30

## 2020-06-29 RX ADMIN — Medication 100 MILLIGRAM(S): at 21:21

## 2020-06-29 RX ADMIN — PANTOPRAZOLE SODIUM 40 MILLIGRAM(S): 20 TABLET, DELAYED RELEASE ORAL at 06:07

## 2020-06-29 RX ADMIN — LEVETIRACETAM 400 MILLIGRAM(S): 250 TABLET, FILM COATED ORAL at 06:07

## 2020-06-29 RX ADMIN — LEVETIRACETAM 400 MILLIGRAM(S): 250 TABLET, FILM COATED ORAL at 18:31

## 2020-06-29 RX ADMIN — SENNA PLUS 2 TABLET(S): 8.6 TABLET ORAL at 21:21

## 2020-06-29 RX ADMIN — SODIUM CHLORIDE 75 MILLILITER(S): 9 INJECTION, SOLUTION INTRAVENOUS at 10:25

## 2020-06-29 NOTE — CONSULT NOTE ADULT - ASSESSMENT
Assessment & Plan    65y Male 1d s/p     NEURO:    Acute pain-controlled with     RESP:     Oxygen insufficiency-wean off NC to RA as tolerate    Activity-      CARDS:       Imaging:     Labs:     GI/NUTR:     Diet, NPO  Diet, Clear Liquid  Diet, Regular      GI Prophylaxis-    Bowel regimen-            /RENAL:       Strict I/O-    BUN/Cr- 20/0.7  <<<---, 22/0.8  <<<---              HEME/ONC:       DVT prophylaxis-    Acute anemia-H/H 13.9 (06-29 @ 04:58)  14.7 (06-28 @ 17:40)        ID:          ENDO:      Glucose Glucose, Serum: 105 (06-29 @ 04:58)      HA1C     LINES/DRAINS:  Jodie CHAVEZ Foley     DISPO:    SICU Assessment & Plan  65M w/ PMH of GERD admitted s/p fall found to have 2.5cm R SDH with 1.6cm midline shift. Pt is s/p     #Left sided weakness and ataxia secondary to Subdural hematoma   - Noted 2.5cm SDH on CT with compression of Right brain and 1.6cm midline shift  - NIHSS 0 right now   - Neurosurgery following; plan for R mishel hole and possible crani for evacuation of SDH  - Keppra 750mg BID   - NO anticoagulation   - NO dextrose containing products  - BP goal: -150  - HOB at 30 degress  - Neuro checks q1 hour    #GERD  - Not on any medications at home  - Start on Prtotonix    Activity: As tolerated  Diet: NPO for procedure  DVT ppx: Sequentials  GI ppx: Protonix  Code Status: Full Code  DISPO: Monitor in the ICU    NEURO:    Acute pain-controlled with     RESP:     Oxygen insufficiency-wean off NC to RA as tolerate    Activity-      CARDS:       Imaging:     Labs:     GI/NUTR:     Diet, NPO  Diet, Clear Liquid  Diet, Regular      GI Prophylaxis-    Bowel regimen-            /RENAL:       Strict I/O-    BUN/Cr- 20/0.7  <<<---, 22/0.8  <<<---              HEME/ONC:       DVT prophylaxis-    Acute anemia-H/H 13.9 (06-29 @ 04:58)  14.7 (06-28 @ 17:40)        ID:          ENDO:      Glucose Glucose, Serum: 105 (06-29 @ 04:58)      HA1C     LINES/DRAINS:  Jodie CHAVEZ Foley     DISPO:    SICU Assessment & Plan  65M w/ PMH of GERD admitted s/p fall found to have 2.5cm R SDH with 1.6cm midline shift. Pt is POD 0 from R mishel hole admitted to SICU for Q1H neuro checks 24 hours post-op.     NEURO:  -acute 2.5cm R SDH w/ 1.6cm midline shift s/p R mishel hole  - Q1H neuro checks for 24 hours post-op  - continue keppra 750mg BID  - no dextrose containing products  - HOB at 30 degrees    RESP:   - wean to RA as tolerated  - PMH of smoking      CARDS:   - hemodynamically stable  - BP goal: -150    GI/NUTR:   - CLD until 6/30  - plan to advance in AM  - PMH GERD not on medications  - GI PPX: PTX  - bowel regimen: senna    /RENAL:   - strict I/Os  - no acute issues, BUN/Cr 22/0.8 --> 20/0.7    HEME/ONC:   - DVT PPX: SCDs, holding HSQ until 6/30 AM  - Hemoglobin 14.7 > 13.9    ID:  - 24 hours of post-op ancef 2g  - afebrile  - WBC 7.65 preop, FU post-op labs    ENDO:  - no chronic diagnoses  - FS before meals and at bedtime  - Glucose, Serum: 105 (06-29 @ 04:58)    LINES/DRAINS:  PIV    ACTIVITY: bedrest until 6/30 AM  DISPO: SICU, approved by Dr. Collado Assessment & Plan  65M w/ PMH of GERD admitted s/p fall found to have 2.5cm R SDH with 1.6cm midline shift. Pt is POD 0 from R mishel hole admitted to SICU for Q1H neuro checks 24 hours post-op.     NEURO:  -acute 2.5cm R SDH w/ 1.6cm midline shift s/p R mishel hole  - Q1H neuro checks for 24 hours post-op  - continue keppra 750mg BID  - no dextrose containing products  - HOB at 30 degrees    RESP:   - wean to RA as tolerated  - PMH of smoking      CARDS:   - pre-op EKG: RBBB  - hemodynamically stable  - BP goal: -150    GI/NUTR:   - CLD until 6/30  - plan to advance in AM  - PMH GERD not on medications  - GI PPX: PTX  - bowel regimen: senna    /RENAL:   - strict I/Os  - no acute issues, BUN/Cr 22/0.8 --> 20/0.7    HEME/ONC:   - DVT PPX: SCDs, holding HSQ until 6/30 AM  - Hemoglobin 14.7 > 13.9    ID:  - 24 hours of post-op ancef 2g  - afebrile  - WBC 7.65 preop, FU post-op labs    ENDO:  - no chronic diagnoses  - FS before meals and at bedtime  - Glucose, Serum: 105 (06-29 @ 04:58)    LINES/DRAINS:  PIV    ACTIVITY: bedrest until 6/30 AM  DISPO: SICU, approved by Dr. Collado

## 2020-06-29 NOTE — PROGRESS NOTE ADULT - SUBJECTIVE AND OBJECTIVE BOX
[facility]  Last 24hr Events & Subjective:      Past Medical Hx:  Chronic GERD    Past Surgical Hx:  No significant past surgical history    Allergies:  No Known Allergies    Current Meds:  Standing Meds    PRN Meds    Vital Signs  T(F): 98.7 (06-29-20 @ 08:22), Max: 98.8 (06-28-20 @ 17:03)  HR: 53 (06-29-20 @ 08:22) (39 - 62)  BP: 110/59 (06-29-20 @ 08:22) (101/54 - 144/67)  BP(mean): 71 (06-29-20 @ 07:33) (71 - 93)  ABP: --  ABP(mean): --  RR: 16 (06-29-20 @ 07:56) (15 - 25)  SpO2: 98% (06-29-20 @ 07:56) (98% - 100%)  Fluid Balance    06-28-20 @ 07:01  -  06-29-20 @ 07:00  --------------------------------------------------------  IN:  Total IN: 0 mL    OUT:    Voided: 100 mL  Total OUT: 100 mL    Total NET: -100 mL          Physical Exam:  Physical Exam: T(C): 36.9 (06-28-20 @ 19:35), Max: 37.1 (06-28-20 @ 17:03)  HR: 48 (06-28-20 @ 19:35) (48 - 62)  BP: 144/67 (06-28-20 @ 19:35) (120/58 - 144/67)  RR: 18 (06-28-20 @ 19:35) (16 - 18)  SpO2: 99% (06-28-20 @ 19:35) (98% - 99%)   PHYSICAL EXAM:  GENERAL: NAD, well-developed, elderly appearing male   HEAD:  Atraumatic, Normocephalic  EYES: EOMI, PERRLA, conjunctiva and sclera clear  ENT:No nasal obstruction or discharge. No tonsillar exudate, swelling or erythema.  NECK: Supple, No JVD  CHEST/LUNG: Clear to auscultation bilaterally; No wheeze  HEART: Regular rate and rhythm; No murmurs, rubs, or gallops  ABDOMEN: Soft, Nontender, Nondistended; Bowel sounds present  EXTREMITIES:  2+ Peripheral Pulses, No clubbing, cyanosis, or edema  PSYCH: AAOx3  NEUROLOGY: non-focal, NIHSS 0 SKIN: No rashes or lesions	      Labs:                              13.9<L>  7.65    )-----------(   253      ( 29 Jun 2020 04:58 )                   40.9<L>    Neutro% 73.7 , Lympho% 14.0<L>, Mono% 9.4<H>, Bands 0.3    29 Jun 2020 04:58    139    |  105    |  20     ----------------------------<  105    3.8     |  24     |  0.7      Ca    8.8        29 Jun 2020 04:58  Mg     2.1       29 Jun 2020 04:58    TPro  6.8    /  Alb  4.2    /  TBili  0.4    /  DBili  x      /  AST  18     /  ALT  8      /  AlkPhos  77     28 Jun 2020 17:40          pTT     30.8  ----------< 1.00 INR/ 06-28-20 @ 17:40     11.50      PT      Troponin <0.01, CKMB --, CK --/ 06-28-20 @ 17:40        Urinalysis Basic - ( 28 Jun 2020 20:59 )    Color: Light Yellow / Appearance: Clear / SG: >1.050 / pH: x  Gluc: x / Ketone: Negative  / Bili: Negative / Urobili: <2 mg/dL   Blood: x / Protein: 30 mg/dL / Nitrite: Negative   Leuk Esterase: Negative / RBC: 2 /HPF / WBC 0 /HPF   Sq Epi: x / Non Sq Epi: 0 /HPF / Bacteria: 0.0    Imaging & EKG:   from: Xray Chest 1 View- PORTABLE-Routine (06.29.20 @ 05:58)    Impression:      No radiographic evidence of acute cardiopulmonary disease.    Unchanged.        Assessment & Plan:  This is a 65 year old male Cantonese speaking with PMHx of GERD not on any medications who presented with left sided weakness associated with off balanced gait for 2-3 days.    #Left sided weakness and ataxia secondary to Subdural hematoma   - Noted 2.5cm SDH on CT with compression of Right brain and 1.6cm midline shift  - NIHSS 0 right now   - Neurosurgery following; plan for OR tomorrow for R mishel hole and possible crani for evacuation of SDH  - CTH to be repeated in the AM or sooner if patient's mental status changes  - NPO at midnight  - Keppra 750mg BID   - NO anticoagulation   - NO dextrose containing products  - BP goal: -150  - COVID-19 low suspicion; swab sent   - HOB at 30 degress  - Neuro checks q1 hour    #GERD  - Not on any medications at home  - Start on Prtotonix    Activity: As tolerated  Diet: NPO for procedure  DVT ppx: Sequentials  GI ppx: Protonix  Code Status: Full Code  DISPO: From home; admit to ICU Hopital Day:1d  Last 24hr Events & Subjective:  Patient seen and examined. No overnight events, on room air. He has left sided weakness. Plan to do a [place] hole with possible craniotomy today.     Past Medical Hx:  Chronic GERD    Past Surgical Hx:  No significant past surgical history    Allergies:  No Known Allergies    Current Meds:  Standing Meds    PRN Meds    Vital Signs  T(F): 98.7 (06-29-20 @ 08:22), Max: 98.8 (06-28-20 @ 17:03)  HR: 53 (06-29-20 @ 08:22) (39 - 62)  BP: 110/59 (06-29-20 @ 08:22) (101/54 - 144/67)  BP(mean): 71 (06-29-20 @ 07:33) (71 - 93)  ABP: --  ABP(mean): --  RR: 16 (06-29-20 @ 07:56) (15 - 25)  SpO2: 98% (06-29-20 @ 07:56) (98% - 100%)  Fluid Balance    06-28-20 @ 07:01  -  06-29-20 @ 07:00  --------------------------------------------------------  IN:  Total IN: 0 mL    OUT:    Voided: 100 mL  Total OUT: 100 mL    Total NET: -100 mL    Physical Exam:  GENERAL: NAD, well-developed, elderly appearing male   	HEAD:  Atraumatic, Normocephalic  	EYES: EOMI, PERRLA, conjunctiva and sclera clear  	ENT:No nasal obstruction or discharge. No tonsillar exudate, swelling or erythema.  	NECK: Supple, No JVD  	CHEST/LUNG: Clear to auscultation bilaterally; No wheeze  	HEART: Regular rate and rhythm; No murmurs, rubs, or gallops  	ABDOMEN: Soft, Nontender, Nondistended; Bowel sounds present  	EXTREMITIES:  2+ Peripheral Pulses, No clubbing, cyanosis, or edema  	PSYCH: AAOx3  	NEUROLOGY: left sided weakness, NIHSS 0  SKIN: No rashes or lesions    Labs:                              13.9<L>  7.65    )-----------(   253      ( 29 Jun 2020 04:58 )                   40.9<L>    Neutro% 73.7 , Lympho% 14.0<L>, Mono% 9.4<H>, Bands 0.3    29 Jun 2020 04:58    139    |  105    |  20     ----------------------------<  105    3.8     |  24     |  0.7      Ca    8.8        29 Jun 2020 04:58  Mg     2.1       29 Jun 2020 04:58    TPro  6.8    /  Alb  4.2    /  TBili  0.4    /  DBili  x      /  AST  18     /  ALT  8      /  AlkPhos  77     28 Jun 2020 17:40          pTT     30.8  ----------< 1.00 INR/ 06-28-20 @ 17:40     11.50      PT      Troponin <0.01, CKMB --, CK --/ 06-28-20 @ 17:40        Urinalysis Basic - ( 28 Jun 2020 20:59 )    Color: Light Yellow / Appearance: Clear / SG: >1.050 / pH: x  Gluc: x / Ketone: Negative  / Bili: Negative / Urobili: <2 mg/dL   Blood: x / Protein: 30 mg/dL / Nitrite: Negative   Leuk Esterase: Negative / RBC: 2 /HPF / WBC 0 /HPF   Sq Epi: x / Non Sq Epi: 0 /HPF / Bacteria: 0.0    Imaging & EKG:   from: Xray Chest 1 View- PORTABLE-Routine (06.29.20 @ 05:58)    Impression:      No radiographic evidence of acute cardiopulmonary disease.    Unchanged.       from: CT Angio Head w/ IV Cont (06.28.20 @ 18:10) >  IMPRESSION:     No CT evidence of acute intracerebral large vessel filling defect. No intracerebral artery aneurysms.    Partially imaged bilateral lung centrilobular and paraseptal emphysematous changes.    The following is added to the final report:    There is a large right convexity subdural isodense hematoma causing midline shift right to left of about 1.2 cm.    Assessment & Plan:  This is a 65 year old male Cantonese speaking with PMHx of GERD not on any medications who presented with left sided weakness associated with off balanced gait for 2-3 days.    #Left sided weakness and ataxia secondary to Subdural hematoma   - Noted 2.5cm SDH on CT with compression of Right brain and 1.6cm midline shift  - NIHSS 0 right now   - Neurosurgery following; plan for R mishel hole and possible crani for evacuation of SDH  - Keppra 750mg BID   - NO anticoagulation   - NO dextrose containing products  - BP goal: -150  - HOB at 30 degress  - Neuro checks q1 hour    #GERD  - Not on any medications at home  - Start on Prtotonix    Activity: As tolerated  Diet: NPO for procedure  DVT ppx: Sequentials  GI ppx: Protonix  Code Status: Full Code  DISPO: From home; admit to ICU Hopital Day:1d  Last 24hr Events & Subjective:  Patient seen and examined. No overnight events, on room air. He has left sided weakness. Plan to do a [place] hole with possible craniotomy today.     Past Medical Hx:  Chronic GERD    Past Surgical Hx:  No significant past surgical history    Allergies:  No Known Allergies    Current Meds:  Standing Meds    PRN Meds    Vital Signs  T(F): 98.7 (06-29-20 @ 08:22), Max: 98.8 (06-28-20 @ 17:03)  HR: 53 (06-29-20 @ 08:22) (39 - 62)  BP: 110/59 (06-29-20 @ 08:22) (101/54 - 144/67)  BP(mean): 71 (06-29-20 @ 07:33) (71 - 93)  ABP: --  ABP(mean): --  RR: 16 (06-29-20 @ 07:56) (15 - 25)  SpO2: 98% (06-29-20 @ 07:56) (98% - 100%)  Fluid Balance    06-28-20 @ 07:01  -  06-29-20 @ 07:00  --------------------------------------------------------  IN:  Total IN: 0 mL    OUT:    Voided: 100 mL  Total OUT: 100 mL    Total NET: -100 mL    Physical Exam:  GENERAL: NAD, well-developed, elderly appearing male   	HEAD:  Atraumatic, Normocephalic  	EYES: EOMI, PERRLA, conjunctiva and sclera clear  	ENT:No nasal obstruction or discharge. No tonsillar exudate, swelling or erythema.  	NECK: Supple, No JVD  	CHEST/LUNG: Clear to auscultation bilaterally; No wheeze  	HEART: Regular rate and rhythm; No murmurs, rubs, or gallops  	ABDOMEN: Soft, Nontender, Nondistended; Bowel sounds present  	EXTREMITIES:  2+ Peripheral Pulses, No clubbing, cyanosis, or edema  	PSYCH: AAOx3  	NEUROLOGY: left sided weakness, NIHSS 0  SKIN: No rashes or lesions    Labs:                              13.9<L>  7.65    )-----------(   253      ( 29 Jun 2020 04:58 )                   40.9<L>    Neutro% 73.7 , Lympho% 14.0<L>, Mono% 9.4<H>, Bands 0.3    29 Jun 2020 04:58    139    |  105    |  20     ----------------------------<  105    3.8     |  24     |  0.7      Ca    8.8        29 Jun 2020 04:58  Mg     2.1       29 Jun 2020 04:58    TPro  6.8    /  Alb  4.2    /  TBili  0.4    /  DBili  x      /  AST  18     /  ALT  8      /  AlkPhos  77     28 Jun 2020 17:40          pTT     30.8  ----------< 1.00 INR/ 06-28-20 @ 17:40     11.50      PT      Troponin <0.01, CKMB --, CK --/ 06-28-20 @ 17:40        Urinalysis Basic - ( 28 Jun 2020 20:59 )    Color: Light Yellow / Appearance: Clear / SG: >1.050 / pH: x  Gluc: x / Ketone: Negative  / Bili: Negative / Urobili: <2 mg/dL   Blood: x / Protein: 30 mg/dL / Nitrite: Negative   Leuk Esterase: Negative / RBC: 2 /HPF / WBC 0 /HPF   Sq Epi: x / Non Sq Epi: 0 /HPF / Bacteria: 0.0    Imaging & EKG:   from: Xray Chest 1 View- PORTABLE-Routine (06.29.20 @ 05:58)    Impression:      No radiographic evidence of acute cardiopulmonary disease.    Unchanged.       from: CT Angio Head w/ IV Cont (06.28.20 @ 18:10) >  IMPRESSION:     No CT evidence of acute intracerebral large vessel filling defect. No intracerebral artery aneurysms.    Partially imaged bilateral lung centrilobular and paraseptal emphysematous changes.    The following is added to the final report:    There is a large right convexity subdural isodense hematoma causing midline shift right to left of about 1.2 cm.    Assessment & Plan:  This is a 65 year old male Cantonese speaking with PMHx of GERD not on any medications who presented with left sided weakness associated with off balanced gait for 2-3 days.    #Left sided weakness and ataxia secondary to Subdural hematoma   - Noted 2.5cm SDH on CT with compression of Right brain and 1.6cm midline shift  - NIHSS 0 right now   - Neurosurgery following; plan for R mishel hole and possible crani for evacuation of SDH  - Keppra 750mg BID   - NO anticoagulation   - NO dextrose containing products  - BP goal: -150  - HOB at 30 degress  - Neuro checks q1 hour    #GERD  - Not on any medications at home  - Start on Prtotonix    Activity: As tolerated  Diet: NPO for procedure  DVT ppx: Sequentials  GI ppx: Protonix  Code Status: Full Code  DISPO: Monitor in the ICU

## 2020-06-29 NOTE — CONSULT NOTE ADULT - ATTENDING COMMENTS
66 y/o male found with right subacute SDH.  Underwent right [place] hole with Neurosurgery today.  Patient had preoperative left hemiparesis.  Extubated postoperatively.    PE:  AAO x3  Neuro: Sensory intact; Motor - right 5/5; left 4/5  Chest: decreased bilateral breath sounds.  CV: rrr  Abdomen: soft, nontender.    ASSESSMENT:  66 y/o male with Right Subdural Hematoma.  S/p [place] hole.  Left hemiparesis.  Acute postoperative pain.    PLAN:  - Neuro: neuro checks q1h; Keppra; CT head in am.  - Respiratory: incentive spirometer  - Cardio: keep normotensive.  - GI: clear liquid diet  - Renal: follow serum electrolytes and UOP.  - DVT prophylaxis with SCDs only 66 y/o male found with right subacute SDH.  Underwent right [place] hole with Neurosurgery today.  Patient had preoperative left hemiparesis.  Extubated postoperatively.    PE:  AAO x3  Neuro: Sensory intact; Motor - right 5/5; left 4/5  Chest: decreased bilateral breath sounds.  CV: rrr  Abdomen: soft, nontender.    ASSESSMENT:  66 y/o male with Right Subdural Hematoma.  S/p [place] hole.  Left hemiparesis.  Atelectasis.  Acute postoperative pain.    PLAN:  - Neuro: neuro checks q1h; Keppra; CT head in am.  - Respiratory: incentive spirometer  - Cardio: keep normotensive.  - GI: clear liquid diet  - Renal: follow serum electrolytes and UOP.  - DVT prophylaxis with SCDs only

## 2020-06-29 NOTE — CONSULT NOTE ADULT - ASSESSMENT
IMPRESSION:    Subdural hematoma -  going for mise[place] hole        PLAN:    CNS: F/u Neurosx today. Neuro checks q1h    HEENT: Oral care    PULMONARY:  HOB @ 45 degrees.  Aspiration precautions.     CARDIOVASCULAR: Maintain I = O    GI: GI prophylaxis.  Feeding after OR     RENAL:  Follow up lytes.  Correct as needed    INFECTIOUS DISEASE: Follow up cultures    HEMATOLOGICAL:  DVT prophylaxis.    ENDOCRINE:  Follow up FS.  Insulin protocol if needed.    MUSCULOSKELETAL: Bedrest    MICU monitoring

## 2020-06-29 NOTE — CONSULT NOTE ADULT - SUBJECTIVE AND OBJECTIVE BOX
Patient is a 65y old  Male who presents with a chief complaint of Subdural Hematoma (28 Jun 2020 20:20)      HPI:  This is a 65 year old male Cantonese speaking with PMHx of GERD not on any medications who presented with left sided weakness associated with off balanced gait for 2-3 days. The son also reports pt has had weight loss and decreased appetite. Of note in talking with the son the patient admits to hitting his head about one week ago but reported no loss of conscious with it. On the day of presentation he was having worsening left sided weakness with ataxia and unbalanced gait.     In the ED /58, HR 62, T98.8, sat 98% on room air. Initial NIHSS reported 2 but was 0 on my examination. CT head noted for 2.5cm right subdural isodensity representing subacute subdural hematoma with 1.6cm midline shift to the left. Noted acute on subacute hemorrhage with noted focal hyerdensities within the subdural collection. Evaluated by Neurosurgery who advised plan for mishel hole tomorrow with possible craniotomy and ICU monitoring for q1 neurochecks.    Today going for mishel hole.      PAST MEDICAL & SURGICAL HISTORY:  Chronic GERD  No significant past surgical history      SOCIAL HX:   Smoking Active 50 pack year                         ETOH   denies                         Other    FAMILY HISTORY:  No pertinent family history in first degree relatives  :  No known cardiovascular family history     Review Of Systems:     All ROS are negative except per HPI       Allergies    No Known Allergies    Intolerances          PHYSICAL EXAM    ICU Vital Signs Last 24 Hrs  T(C): 37.1 (29 Jun 2020 08:22), Max: 37.1 (28 Jun 2020 17:03)  T(F): 98.7 (29 Jun 2020 08:22), Max: 98.8 (28 Jun 2020 17:03)  HR: 53 (29 Jun 2020 08:22) (39 - 62)  BP: 110/59 (29 Jun 2020 08:22) (101/54 - 144/67)  BP(mean): 71 (29 Jun 2020 07:33) (71 - 93)  RR: 16 (29 Jun 2020 07:56) (15 - 25)  SpO2: 98% (29 Jun 2020 07:56) (98% - 100%)      CONSTITUTIONAL:  Well nourished.  NAD    ENT:   Airway patent,   Mouth with normal mucosa.   No thrush    EYES:   pupils equal,   round and reactive to light.    CARDIAC:   Normal rate,   Regular rhythm.    Heart sounds S1, S2.   No edema      Vascular:   normal systolic impulse  no bruits    RESPIRATORY:   No wheezing   Normal chest expansion  No use of accessory muscles    GASTROINTESTINAL:  Abdomen soft   Non-tender,   No guarding,   + BS    GENITOURINARY  normal genitalia for sex  no edema    MUSCULOSKELETAL:   Range of motion is not limited,  Nno clubbing, cyanosis    NEUROLOGICAL:   Alert and oriented   Left sided weakness and numbness    SKIN:   Skin normal color for race,   Warm and dry  No evidence of rash.    PSYCHIATRIC:   Normal mood and affect.   No apparent risk to self or others.                06-28-20 @ 07:01  -  06-29-20 @ 07:00  --------------------------------------------------------  IN:  Total IN: 0 mL    OUT:    Voided: 100 mL  Total OUT: 100 mL    Total NET: -100 mL          LABS:                          13.9   7.65  )-----------( 253      ( 29 Jun 2020 04:58 )             40.9                                               06-29    139  |  105  |  20  ----------------------------<  105<H>  3.8   |  24  |  0.7    Ca    8.8      29 Jun 2020 04:58  Mg     2.1     06-29    TPro  6.8  /  Alb  4.2  /  TBili  0.4  /  DBili  x   /  AST  18  /  ALT  8   /  AlkPhos  77  06-28      PT/INR - ( 28 Jun 2020 17:40 )   PT: 11.50 sec;   INR: 1.00 ratio         PTT - ( 28 Jun 2020 17:40 )  PTT:30.8 sec                                       Urinalysis Basic - ( 28 Jun 2020 20:59 )    Color: Light Yellow / Appearance: Clear / SG: >1.050 / pH: x  Gluc: x / Ketone: Negative  / Bili: Negative / Urobili: <2 mg/dL   Blood: x / Protein: 30 mg/dL / Nitrite: Negative   Leuk Esterase: Negative / RBC: 2 /HPF / WBC 0 /HPF   Sq Epi: x / Non Sq Epi: 0 /HPF / Bacteria: 0.0        CARDIAC MARKERS ( 28 Jun 2020 17:40 )  x     / <0.01 ng/mL / x     / x     / x                                                LIVER FUNCTIONS - ( 28 Jun 2020 17:40 )  Alb: 4.2 g/dL / Pro: 6.8 g/dL / ALK PHOS: 77 U/L / ALT: 8 U/L / AST: 18 U/L / GGT: x                                                                                                                                       X-Rays reviewed:                                                                                    ECHO    CXR interpreted by me: No acute cardiopulmonary disease    MEDICATIONS  (STANDING):    MEDICATIONS  (PRN):

## 2020-06-29 NOTE — PHARMACOTHERAPY INTERVENTION NOTE - COMMENTS
levetiracetam 750mg IV q12h 1st dose now-d/w surgical team to evaluate stat dose, pt received 750mg IV 6am, prior to procedure  -will cancel stat dose  pantoprazole 40mg q24h, pt received dose at 6am, start new order for 6/30/20

## 2020-06-29 NOTE — PROGRESS NOTE ADULT - SUBJECTIVE AND OBJECTIVE BOX
POD # 0 S/P R burrhole for evacuation of subdural hematoma   Patient doing well post-operatively. Denies pain, headache.     Vital Signs Last 24 Hrs  T(C): 35.3 (29 Jun 2020 12:43), Max: 37.1 (28 Jun 2020 17:03)  T(F): 95.5 (29 Jun 2020 12:43), Max: 98.8 (28 Jun 2020 17:03)  HR: 48 (29 Jun 2020 15:00) (39 - 85)  BP: 134/65 (29 Jun 2020 15:00) (101/54 - 175/79)  BP(mean): 85 (29 Jun 2020 15:00) (71 - 93)  RR: 14 (29 Jun 2020 15:00) (11 - 25)  SpO2: 100% (29 Jun 2020 15:00) (98% - 100%)    PHYSICAL EXAM:  AOx3, NAD  PERRL, EOMI  Motor 5/5 throughout all extremities  sens intact as per pt  Wound C/D/I, dressing in place          MEDICATIONS:  Antibiotics:  ceFAZolin   IVPB 2000 milliGRAM(s) IV Intermittent every 8 hours    Neuro:  acetaminophen   Tablet .. 650 milliGRAM(s) Oral every 6 hours  levETIRAcetam  IVPB 750 milliGRAM(s) IV Intermittent every 12 hours  oxyCODONE    IR 5 milliGRAM(s) Oral every 6 hours PRN    Anticoagulation:    OTHER:  chlorhexidine 4% Liquid 1 Application(s) Topical <User Schedule>  pantoprazole    Tablet 40 milliGRAM(s) Oral before breakfast    IVF:        LABS:                        13.9   7.65  )-----------( 253      ( 29 Jun 2020 04:58 )             40.9     06-29    139  |  105  |  20  ----------------------------<  105<H>  3.8   |  24  |  0.7    Ca    8.8      29 Jun 2020 04:58  Mg     2.1     06-29    TPro  6.8  /  Alb  4.2  /  TBili  0.4  /  DBili  x   /  AST  18  /  ALT  8   /  AlkPhos  77  06-28    PT/INR - ( 28 Jun 2020 17:40 )   PT: 11.50 sec;   INR: 1.00 ratio         PTT - ( 28 Jun 2020 17:40 )  PTT:30.8 sec  Urinalysis Basic - ( 28 Jun 2020 20:59 )    Color: Light Yellow / Appearance: Clear / SG: >1.050 / pH: x  Gluc: x / Ketone: Negative  / Bili: Negative / Urobili: <2 mg/dL   Blood: x / Protein: 30 mg/dL / Nitrite: Negative   Leuk Esterase: Negative / RBC: 2 /HPF / WBC 0 /HPF   Sq Epi: x / Non Sq Epi: 0 /HPF / Bacteria: 0.0        RADIOLOGY: POD # 0 S/P R burrhole for evacuation of subdural hematoma   Patient doing well post-operatively. Denies pain, headache.     Vital Signs Last 24 Hrs  T(C): 35.3 (29 Jun 2020 12:43), Max: 37.1 (28 Jun 2020 17:03)  T(F): 95.5 (29 Jun 2020 12:43), Max: 98.8 (28 Jun 2020 17:03)  HR: 48 (29 Jun 2020 15:00) (39 - 85)  BP: 134/65 (29 Jun 2020 15:00) (101/54 - 175/79)  BP(mean): 85 (29 Jun 2020 15:00) (71 - 93)  RR: 14 (29 Jun 2020 15:00) (11 - 25)  SpO2: 100% (29 Jun 2020 15:00) (98% - 100%)    PHYSICAL EXAM:  AOx3, NAD  speech clear  PERRL, pupils 2mm b/l, b/l  EOMI  Motor 5/5 throughout all extremities  sens intact as per pt  Wound C/D/I, dressing replaced  no edema, erythema or discharge noted  staples in place  No pronator drift        MEDICATIONS:  Antibiotics:  ceFAZolin   IVPB 2000 milliGRAM(s) IV Intermittent every 8 hours    Neuro:  acetaminophen   Tablet .. 650 milliGRAM(s) Oral every 6 hours  levETIRAcetam  IVPB 750 milliGRAM(s) IV Intermittent every 12 hours  oxyCODONE    IR 5 milliGRAM(s) Oral every 6 hours PRN    Anticoagulation:    OTHER:  chlorhexidine 4% Liquid 1 Application(s) Topical <User Schedule>  pantoprazole    Tablet 40 milliGRAM(s) Oral before breakfast    IVF:        LABS:                        13.9   7.65  )-----------( 253      ( 29 Jun 2020 04:58 )             40.9     06-29    139  |  105  |  20  ----------------------------<  105<H>  3.8   |  24  |  0.7    Ca    8.8      29 Jun 2020 04:58  Mg     2.1     06-29    TPro  6.8  /  Alb  4.2  /  TBili  0.4  /  DBili  x   /  AST  18  /  ALT  8   /  AlkPhos  77  06-28    PT/INR - ( 28 Jun 2020 17:40 )   PT: 11.50 sec;   INR: 1.00 ratio         PTT - ( 28 Jun 2020 17:40 )  PTT:30.8 sec  Urinalysis Basic - ( 28 Jun 2020 20:59 )    Color: Light Yellow / Appearance: Clear / SG: >1.050 / pH: x  Gluc: x / Ketone: Negative  / Bili: Negative / Urobili: <2 mg/dL   Blood: x / Protein: 30 mg/dL / Nitrite: Negative   Leuk Esterase: Negative / RBC: 2 /HPF / WBC 0 /HPF   Sq Epi: x / Non Sq Epi: 0 /HPF / Bacteria: 0.0        RADIOLOGY:

## 2020-06-29 NOTE — CONSULT NOTE ADULT - SUBJECTIVE AND OBJECTIVE BOX
SICU Consultation Note  ======================================================================================================  RAISA KHAN  [id number]      64 yo Cantonese-speaking M w/ PMH of GERD not on medications who presented on 6/28 with left sided weakness and weakness for 2-3 days.     This is a 65 year old male Cantonese speaking with PMHx of GERD not on any medications who presented with left sided weakness associated with off balanced gait for 2-3 days. The son also reports pt has had weight loss and decreased appetite. Of note in talking with the son the patient admits to hitting his head about one week ago but reported no loss of conscious with it. On the day of presentation he was having worsening left sided weakness with ataxia and unbalanced gait.     In the ED /58, HR 62, T98.8, sat 98% on room air. Initial NIHSS reported 2 but was 0 on my examination. CT head noted for 2.5cm right subdural isodensity representing subacute subdural hematoma with 1.6cm midline shift to the left. Noted acute on subacute hemorrhage with noted focal hyerdensities within the subdural collection. Evaluated by Neurosurgery who advised plan for mishel hole tomorrow with possible craniotomy and ICU monitoring for q1 neuro checks.    active smoker, 50 PY  denies alcohol use    In the ICU, ....    SICU is consulted for Q1H neuro checks s/p R sided mishel hole. Pt transferred to neurosurgery service.     Pertinent Imaging:  Echo- none on file  CT- 2.5 cm right subdural isodensity, representing subacute subdural hematoma with 1.6 cm midline shift to the left. Focal hyper densities within the subdural collection, likely representing acute on subacute hemorrhage.     Procedure: creation of right sided mishel hole  OR Stats  OR Time:               EBL: 20          IV Fluids:       Blood Products:                UOP:      Findings- subacute SDH under pressure    PMH  PAST MEDICAL & SURGICAL HISTORY:  Chronic GERD  No significant past surgical history    Home Meds:  Home Medications:  Multiple Vitamins oral tablet: 1 tab(s) orally once a day (28 Jun 2020 20:40)    Allergies  Allergies    Current Medications:  fentaNYL    Injectable 25 MICROGram(s) IV Push every 5 minutes PRN Moderate Pain (4 - 6)  lactated ringers. 1000 milliLiter(s) (75 mL/Hr) IV Continuous <Continuous>  ondansetron Injectable 4 milliGRAM(s) IV Push once PRN Nausea and/or Vomiting  oxycodone    5 mG/acetaminophen 325 mG 1 Tablet(s) Oral every 4 hours PRN Moderate Pain (4 - 6)    Advanced Directives: Presumed Full Code    VITAL SIGNS, INS/OUTS (Last 24hours):    ICU Vital Signs Last 24 Hrs  T(C): 36.4 (29 Jun 2020 11:15), Max: 37.1 (28 Jun 2020 17:03)  T(F): 97.6 (29 Jun 2020 11:15), Max: 98.8 (28 Jun 2020 17:03)  HR: 70 (29 Jun 2020 11:15) (39 - 85)  BP: 135/63 (29 Jun 2020 11:15) (101/54 - 175/79)  BP(mean): 71 (29 Jun 2020 07:33) (71 - 93)  RR: 17 (29 Jun 2020 11:15) (11 - 25)  SpO2: 100% (29 Jun 2020 11:15) (98% - 100%)    I&O's Summary    28 Jun 2020 07:01  -  29 Jun 2020 07:00  --------------------------------------------------------  IN: 0 mL / OUT: 100 mL / NET: -100 mL    Height (cm): 170.18 (06-29-20)  Weight (kg): 54 (06-29-20)  BMI (kg/m2): 18.6 (06-29-20)  BSA (m2): 1.62 (06-29-20)    Physical Exam:   ---------------------------------------------------------------------------------------  GCS:      Exam: A&Ox3, no focal deficits    RESPIRATORY:  Normal expansion/effort  Mechanical Ventilation:     CARDIOVASCULAR:   S1/S2.  RRR  No peripheral edema    GASTROINTESTINAL:  Abdomen soft, non-tender, non-distended    MUSCULOSKELETAL:  Extremities warm, pink, well-perfused.    DERM:  No skin breakdown     :   Exam: Stephens catheter in place.     Tubes/Lines/Drains   ----------------------------------------------------------------------------------------------------------  [x] Peripheral IV  [] Central Venous Line          IJ/Femoral             Date Placed:    [] Arterial Line		      Radial/Femoral    Date Placed:   [] PICC:         	  [] Midline		                                  Date Placed:   [X] Urinary Catheter Stephens                                             Date Placed:       LABS  --------------------------------------------------------------------------------------  POCT Blood Glucose.: 108 mg/dL (28 Jun 2020 17:31)                        13.9   7.65  )-----------( 253      ( 29 Jun 2020 04:58 )             40.9       Auto Immature Granulocyte %: 0.3 % (06-29-20 @ 04:58)  Auto Neutrophil %: 73.7 % (06-29-20 @ 04:58)  Auto Immature Granulocyte %: 0.2 % (06-28-20 @ 17:40)  Auto Neutrophil %: 77.3 % (06-28-20 @ 17:40)    06-29    139  |  105  |  20  ----------------------------<  105<H>  3.8   |  24  |  0.7    Calcium, Total Serum: 8.8 mg/dL (06-29-20 @ 04:58)    LFTs:             6.8  | 0.4  | 18       ------------------[77      ( 28 Jun 2020 17:40 )  4.2  | x    | 8           Coags:     11.50  ----< 1.00    ( 28 Jun 2020 17:40 )     30.8      CARDIAC MARKERS ( 28 Jun 2020 17:40 )  x     / <0.01 ng/mL / x     / x     / x        Urinalysis Basic - ( 28 Jun 2020 20:59 )    Color: Light Yellow / Appearance: Clear / SG: >1.050 / pH: x  Gluc: x / Ketone: Negative  / Bili: Negative / Urobili: <2 mg/dL   Blood: x / Protein: 30 mg/dL / Nitrite: Negative   Leuk Esterase: Negative / RBC: 2 /HPF / WBC 0 /HPF   Sq Epi: x / Non Sq Epi: 0 /HPF / Bacteria: 0.0      CT/XRAY/ECHO/TCD/EEG  ----------------------------------------------------------------------------------------------  < from: CT Head No Cont (06.28.20 @ 17:55) >  IMPRESSION:   2.5 cm right subdural isodensity, representing subacute subdural hematoma with 1.6 cm midline shift to the left. Focal hyperdensities within the subdural collection, likely representing acuteon subacute hemorrhage.       < from: CT Angio Head w/ IV Cont (06.28.20 @ 18:10) >  IMPRESSION:     No CT evidence of acute intracerebral large vessel filling defect. No intracerebral artery aneurysms.  Partially imaged bilateral lung centrilobular and paraseptal emphysematous changes.    The following is added to the final report:  There is a large right convexity subdural isodense hematoma causing midline shift right to left of about 1.2 cm.    --------------------------------------------------------------------------------------  Admit Diagnosis: SUBDURAL HEMATOMA WEAKNESS [facility] SICU Consultation Note  ======================================================================================================  RAISA KHAN  [id number]      66 yo Cantonese-speaking M w/ PMH of GERD not on medications who presented on 6/28 with 2-3 days of left sided weakness and ataxia. Pt reportedly hit his head 1 week prior to admission; denies LOC. Family brought pt in to the ED because he was having worsening L sided weakness. In the ED, pt was hemodynamically stable: /58, HR 62, T98.8, sat 98% on room air. Initial NIHSS ranged from 0-2 on exam. CT head notable for 2.5cm right subdural isodensity representing subacute subdural hematoma with 1.6cm midline shift to the left. Noted acute on subacute hemorrhage with noted focal hyerdensities within the subdural collection. He was admitted to the MICU for Q1H neuro checks. Pt was taken to the OR for R sided mishel hole. SICU is consulted for Q1H neuro checks s/p R sided mishel hole. Pt transferred from medicine to neurosurgery service.     Pt is an active smoker and has 50 PY history. Denies alcohol use.     Pertinent Imaging:  Echo- none on file  CT- 2.5 cm right subdural isodensity, representing subacute subdural hematoma with 1.6 cm midline shift to the left. Focal hyper densities within the subdural collection, likely representing acute on subacute hemorrhage.     Procedure: creation of right sided mishel hole  OR Stats  OR Time:               EBL: 20          IV Fluids:       Blood Products: 0              UOP: 0     Findings- subacute SDH under pressure    PMH  PAST MEDICAL & SURGICAL HISTORY:  Chronic GERD  No significant past surgical history    Home Meds:  Home Medications:  Multiple Vitamins oral tablet: 1 tab(s) orally once a day (28 Jun 2020 20:40)    Allergies  Allergies    Current Medications:  fentaNYL    Injectable 25 MICROGram(s) IV Push every 5 minutes PRN Moderate Pain (4 - 6)  lactated ringers. 1000 milliLiter(s) (75 mL/Hr) IV Continuous <Continuous>  ondansetron Injectable 4 milliGRAM(s) IV Push once PRN Nausea and/or Vomiting  oxycodone    5 mG/acetaminophen 325 mG 1 Tablet(s) Oral every 4 hours PRN Moderate Pain (4 - 6)    Advanced Directives: Presumed Full Code    VITAL SIGNS, INS/OUTS (Last 24hours):    ICU Vital Signs Last 24 Hrs  T(C): 36.4 (29 Jun 2020 11:15), Max: 37.1 (28 Jun 2020 17:03)  T(F): 97.6 (29 Jun 2020 11:15), Max: 98.8 (28 Jun 2020 17:03)  HR: 70 (29 Jun 2020 11:15) (39 - 85)  BP: 135/63 (29 Jun 2020 11:15) (101/54 - 175/79)  BP(mean): 71 (29 Jun 2020 07:33) (71 - 93)  RR: 17 (29 Jun 2020 11:15) (11 - 25)  SpO2: 100% (29 Jun 2020 11:15) (98% - 100%)    I&O's Summary    28 Jun 2020 07:01  -  29 Jun 2020 07:00  --------------------------------------------------------  IN: 0 mL / OUT: 100 mL / NET: -100 mL    Height (cm): 170.18 (06-29-20)  Weight (kg): 54 (06-29-20)  BMI (kg/m2): 18.6 (06-29-20)  BSA (m2): 1.62 (06-29-20)    Physical Exam:   ---------------------------------------------------------------------------------------  GCS: 15, A&Ox3 (per son via FT), PERRLA, very mild LUE/LLE weakness, no facial droop    RESPIRATORY:   Normal expansion/effort    CARDIOVASCULAR:   S1/S2.  RRR  No peripheral edema    GASTROINTESTINAL:  Abdomen soft, non-tender, non-distended    MUSCULOSKELETAL:  Extremities warm, pink, well-perfused.    DERM:  No skin breakdown     Tubes/Lines/Drains   ----------------------------------------------------------------------------------------------------------  [x] Peripheral IV  [] Central Venous Line          IJ/Femoral             Date Placed:    [] Arterial Line		      Radial/Femoral    Date Placed:   [] PICC:         	  [] Midline		                                  Date Placed:   [] Urinary Catheter Stephens                                             Date Placed:       LABS  --------------------------------------------------------------------------------------  POCT Blood Glucose.: 108 mg/dL (28 Jun 2020 17:31)                        13.9   7.65  )-----------( 253      ( 29 Jun 2020 04:58 )             40.9       Auto Immature Granulocyte %: 0.3 % (06-29-20 @ 04:58)  Auto Neutrophil %: 73.7 % (06-29-20 @ 04:58)  Auto Immature Granulocyte %: 0.2 % (06-28-20 @ 17:40)  Auto Neutrophil %: 77.3 % (06-28-20 @ 17:40)    06-29    139  |  105  |  20  ----------------------------<  105<H>  3.8   |  24  |  0.7    Calcium, Total Serum: 8.8 mg/dL (06-29-20 @ 04:58)    LFTs:             6.8  | 0.4  | 18       ------------------[77      ( 28 Jun 2020 17:40 )  4.2  | x    | 8           Coags:     11.50  ----< 1.00    ( 28 Jun 2020 17:40 )     30.8      CARDIAC MARKERS ( 28 Jun 2020 17:40 )  x     / <0.01 ng/mL / x     / x     / x        Urinalysis Basic - ( 28 Jun 2020 20:59 )    Color: Light Yellow / Appearance: Clear / SG: >1.050 / pH: x  Gluc: x / Ketone: Negative  / Bili: Negative / Urobili: <2 mg/dL   Blood: x / Protein: 30 mg/dL / Nitrite: Negative   Leuk Esterase: Negative / RBC: 2 /HPF / WBC 0 /HPF   Sq Epi: x / Non Sq Epi: 0 /HPF / Bacteria: 0.0      CT/XRAY/ECHO/TCD/EEG  ----------------------------------------------------------------------------------------------  < from: CT Head No Cont (06.28.20 @ 17:55) >  IMPRESSION:   2.5 cm right subdural isodensity, representing subacute subdural hematoma with 1.6 cm midline shift to the left. Focal hyperdensities within the subdural collection, likely representing acuteon subacute hemorrhage.       < from: CT Angio Head w/ IV Cont (06.28.20 @ 18:10) >  IMPRESSION:     No CT evidence of acute intracerebral large vessel filling defect. No intracerebral artery aneurysms.  Partially imaged bilateral lung centrilobular and paraseptal emphysematous changes.    The following is added to the final report:  There is a large right convexity subdural isodense hematoma causing midline shift right to left of about 1.2 cm.    --------------------------------------------------------------------------------------  Admit Diagnosis: SUBDURAL HEMATOMA WEAKNESS [facility] SICU Consultation Note  ======================================================================================================  RAISA KHAN  [id number]      66 yo Cantonese-speaking M w/ PMH of GERD not on medications who presented on 6/28 with 2-3 days of left sided weakness and ataxia. Pt reportedly hit his head 1 week prior to admission; denies LOC. Family brought pt in to the ED because he was having worsening L sided weakness. In the ED, pt was hemodynamically stable: /58, HR 62, T98.8, sat 98% on room air. Initial NIHSS ranged from 0-2 on exam. CT head notable for 2.5cm right subdural isodensity representing subacute subdural hematoma with 1.6cm midline shift to the left. Noted acute on subacute hemorrhage with noted focal hyerdensities within the subdural collection. He was admitted to the MICU for Q1H neuro checks. Pt was taken to the OR for R sided mishel hole. SICU is consulted for Q1H neuro checks s/p R sided mishel hole. Pt transferred from medicine to neurosurgery service.     Pt is an active smoker and has 50 PY history. Denies alcohol use.     Pertinent Imaging:  Echo- none on file  CT- 2.5 cm right subdural isodensity, representing subacute subdural hematoma with 1.6 cm midline shift to the left. Focal hyper densities within the subdural collection, likely representing acute on subacute hemorrhage.     Procedure: creation of right sided mishel hole  OR Stats  OR Time: 1.5H              EBL: 20, SDH was 200cc          IV Fluids: 500 LR       Blood Products: 0              UOP: NA, no weber  Findings- subacute SDH under pressure  2g ancef    PMH  PAST MEDICAL & SURGICAL HISTORY:  Chronic GERD  No significant past surgical history    Home Meds:  Home Medications:  Multiple Vitamins oral tablet: 1 tab(s) orally once a day (28 Jun 2020 20:40)    Allergies  Allergies    Current Medications:  fentaNYL    Injectable 25 MICROGram(s) IV Push every 5 minutes PRN Moderate Pain (4 - 6)  lactated ringers. 1000 milliLiter(s) (75 mL/Hr) IV Continuous <Continuous>  ondansetron Injectable 4 milliGRAM(s) IV Push once PRN Nausea and/or Vomiting  oxycodone    5 mG/acetaminophen 325 mG 1 Tablet(s) Oral every 4 hours PRN Moderate Pain (4 - 6)    Advanced Directives: Presumed Full Code    VITAL SIGNS, INS/OUTS (Last 24hours):    ICU Vital Signs Last 24 Hrs  T(C): 36.4 (29 Jun 2020 11:15), Max: 37.1 (28 Jun 2020 17:03)  T(F): 97.6 (29 Jun 2020 11:15), Max: 98.8 (28 Jun 2020 17:03)  HR: 70 (29 Jun 2020 11:15) (39 - 85)  BP: 135/63 (29 Jun 2020 11:15) (101/54 - 175/79)  BP(mean): 71 (29 Jun 2020 07:33) (71 - 93)  RR: 17 (29 Jun 2020 11:15) (11 - 25)  SpO2: 100% (29 Jun 2020 11:15) (98% - 100%)    I&O's Summary    28 Jun 2020 07:01  -  29 Jun 2020 07:00  --------------------------------------------------------  IN: 0 mL / OUT: 100 mL / NET: -100 mL    Height (cm): 170.18 (06-29-20)  Weight (kg): 54 (06-29-20)  BMI (kg/m2): 18.6 (06-29-20)  BSA (m2): 1.62 (06-29-20)    Physical Exam:   ---------------------------------------------------------------------------------------  GCS: 15, A&Ox3 (per son via FT), PERRLA, very mild LUE/LLE weakness, no facial droop    RESPIRATORY:   Normal expansion/effort    CARDIOVASCULAR:   S1/S2.  RRR  No peripheral edema    GASTROINTESTINAL:  Abdomen soft, non-tender, non-distended    MUSCULOSKELETAL:  Extremities warm, pink, well-perfused.    DERM:  No skin breakdown     Tubes/Lines/Drains   ----------------------------------------------------------------------------------------------------------  [x] Peripheral IV  [] Central Venous Line          IJ/Femoral             Date Placed:    [] Arterial Line		      Radial/Femoral    Date Placed:   [] PICC:         	  [] Midline		                                  Date Placed:   [] Urinary Catheter Weber                                             Date Placed:       LABS  --------------------------------------------------------------------------------------  POCT Blood Glucose.: 108 mg/dL (28 Jun 2020 17:31)                        13.9   7.65  )-----------( 253      ( 29 Jun 2020 04:58 )             40.9       Auto Immature Granulocyte %: 0.3 % (06-29-20 @ 04:58)  Auto Neutrophil %: 73.7 % (06-29-20 @ 04:58)  Auto Immature Granulocyte %: 0.2 % (06-28-20 @ 17:40)  Auto Neutrophil %: 77.3 % (06-28-20 @ 17:40)    06-29    139  |  105  |  20  ----------------------------<  105<H>  3.8   |  24  |  0.7    Calcium, Total Serum: 8.8 mg/dL (06-29-20 @ 04:58)    LFTs:             6.8  | 0.4  | 18       ------------------[77      ( 28 Jun 2020 17:40 )  4.2  | x    | 8           Coags:     11.50  ----< 1.00    ( 28 Jun 2020 17:40 )     30.8      CARDIAC MARKERS ( 28 Jun 2020 17:40 )  x     / <0.01 ng/mL / x     / x     / x        Urinalysis Basic - ( 28 Jun 2020 20:59 )    Color: Light Yellow / Appearance: Clear / SG: >1.050 / pH: x  Gluc: x / Ketone: Negative  / Bili: Negative / Urobili: <2 mg/dL   Blood: x / Protein: 30 mg/dL / Nitrite: Negative   Leuk Esterase: Negative / RBC: 2 /HPF / WBC 0 /HPF   Sq Epi: x / Non Sq Epi: 0 /HPF / Bacteria: 0.0      CT/XRAY/ECHO/TCD/EEG  ----------------------------------------------------------------------------------------------  < from: CT Head No Cont (06.28.20 @ 17:55) >  IMPRESSION:   2.5 cm right subdural isodensity, representing subacute subdural hematoma with 1.6 cm midline shift to the left. Focal hyperdensities within the subdural collection, likely representing acuteon subacute hemorrhage.       < from: CT Angio Head w/ IV Cont (06.28.20 @ 18:10) >  IMPRESSION:     No CT evidence of acute intracerebral large vessel filling defect. No intracerebral artery aneurysms.  Partially imaged bilateral lung centrilobular and paraseptal emphysematous changes.    The following is added to the final report:  There is a large right convexity subdural isodense hematoma causing midline shift right to left of about 1.2 cm.    --------------------------------------------------------------------------------------  Admit Diagnosis: SUBDURAL HEMATOMA WEAKNESS [facility]

## 2020-06-29 NOTE — CHART NOTE - NSCHARTNOTEFT_GEN_A_CORE
Transfer Note  · Provider Specialty  Critical Care    Reason for Admission:  · Reason for Admission  Subdural Hematoma      · Subjective and Objective:   Hopital Day:1d  Last 24hr Events & Subjective:  Patient seen and examined. No overnight events, on room air. He has left sided weakness. Plan to do a Mishel hole with possible craniotomy today.     Past Medical Hx:  Chronic GERD    Past Surgical Hx:  No significant past surgical history    Allergies:  No Known Allergies    Current Meds:  Standing Meds    PRN Meds    Vital Signs  T(F): 98.7 (06-29-20 @ 08:22), Max: 98.8 (06-28-20 @ 17:03)  HR: 53 (06-29-20 @ 08:22) (39 - 62)  BP: 110/59 (06-29-20 @ 08:22) (101/54 - 144/67)  BP(mean): 71 (06-29-20 @ 07:33) (71 - 93)  ABP: --  ABP(mean): --  RR: 16 (06-29-20 @ 07:56) (15 - 25)  SpO2: 98% (06-29-20 @ 07:56) (98% - 100%)  Fluid Balance    06-28-20 @ 07:01  -  06-29-20 @ 07:00  --------------------------------------------------------  IN:  Total IN: 0 mL    OUT:    Voided: 100 mL  Total OUT: 100 mL    Total NET: -100 mL    Physical Exam:  GENERAL: NAD, well-developed, elderly appearing male   	HEAD:  Atraumatic, Normocephalic  	EYES: EOMI, PERRLA, conjunctiva and sclera clear  	ENT:No nasal obstruction or discharge. No tonsillar exudate, swelling or erythema.  	NECK: Supple, No JVD  	CHEST/LUNG: Clear to auscultation bilaterally; No wheeze  	HEART: Regular rate and rhythm; No murmurs, rubs, or gallops  	ABDOMEN: Soft, Nontender, Nondistended; Bowel sounds present  	EXTREMITIES:  2+ Peripheral Pulses, No clubbing, cyanosis, or edema  	PSYCH: AAOx3  	NEUROLOGY: left sided weakness, NIHSS 0  SKIN: No rashes or lesions    Labs:                              13.9<L>  7.65    )-----------(   253      ( 29 Jun 2020 04:58 )                   40.9<L>    Neutro% 73.7 , Lympho% 14.0<L>, Mono% 9.4<H>, Bands 0.3    29 Jun 2020 04:58    139    |  105    |  20     ----------------------------<  105    3.8     |  24     |  0.7      Ca    8.8        29 Jun 2020 04:58  Mg     2.1       29 Jun 2020 04:58    TPro  6.8    /  Alb  4.2    /  TBili  0.4    /  DBili  x      /  AST  18     /  ALT  8      /  AlkPhos  77     28 Jun 2020 17:40          pTT     30.8  ----------< 1.00 INR/ 06-28-20 @ 17:40     11.50      PT      Troponin <0.01, CKMB --, CK --/ 06-28-20 @ 17:40        Urinalysis Basic - ( 28 Jun 2020 20:59 )    Color: Light Yellow / Appearance: Clear / SG: >1.050 / pH: x  Gluc: x / Ketone: Negative  / Bili: Negative / Urobili: <2 mg/dL   Blood: x / Protein: 30 mg/dL / Nitrite: Negative   Leuk Esterase: Negative / RBC: 2 /HPF / WBC 0 /HPF   Sq Epi: x / Non Sq Epi: 0 /HPF / Bacteria: 0.0    Imaging & EKG:   from: Xray Chest 1 View- PORTABLE-Routine (06.29.20 @ 05:58)    Impression:      No radiographic evidence of acute cardiopulmonary disease.    Unchanged.       from: CT Angio Head w/ IV Cont (06.28.20 @ 18:10) >  IMPRESSION:     No CT evidence of acute intracerebral large vessel filling defect. No intracerebral artery aneurysms.    Partially imaged bilateral lung centrilobular and paraseptal emphysematous changes.    The following is added to the final report:    There is a large right convexity subdural isodense hematoma causing midline shift right to left of about 1.2 cm.    Assessment & Plan:  This is a 65 year old male Cantonese speaking with PMHx of GERD not on any medications who presented with left sided weakness associated with off balanced gait for 2-3 days.    #Left sided weakness and ataxia secondary to Subdural hematoma   - Noted 2.5cm SDH on CT with compression of Right brain and 1.6cm midline shift  - NIHSS 0 right now   - Neurosurgery following; plan for R mishel hole and possible crani for evacuation of SDH  - Keppra 750mg BID   - NO anticoagulation   - NO dextrose containing products  - BP goal: -150  - HOB at 30 degress  - Neuro checks q1 hour    #GERD  - Not on any medications at home  - Start on Prtotonix    Activity: As tolerated  Diet: NPO for procedure  DVT ppx: Sequentials  GI ppx: Protonix  Code Status: Full Code  DISPO: From home; admit to ICU ICU DOWNGRADE NOTE:    65y Male transferred to floor from ICU    Patient is a 65y old Male who presents with a chief complaint of Subdural Hematoma (29 Jun 2020 09:37)    The patient is currently admitted for the primary diagnosis of Subdural hematoma    The patient was admitted to the unit for (1) Days.    The patient was never intubated and was never) on pressors .    Indwelling vascular catheters: peripheral    Urinary Catheter: Stephens    Disposition: SICU    Code Status: Full code    ICU COURSE OF EVENTS:  -------------------------------------------------------------------------------------------    This is a 65 year old male Cantonese speaking with PMHx of GERD not on any medications who presented with left sided weakness associated with off balanced gait for 2-3 days. The son also reports pt has had weight loss and decreased appetite. Of note in talking with the son the patient admits to hitting his head about one week ago but reported no loss of conscious with it. On the day of presentation he was having worsening left sided weakness with ataxia and unbalanced gait.     In the ED /58, HR 62, T98.8, sat 98% on room air. Initial NIHSS reported 2 but was 0 on my examination. CT head noted for 2.5cm right subdural isodensity representing subacute subdural hematoma with 1.6cm midline shift to the left. Noted acute on subacute hemorrhage with noted focal hyerdensities within the subdural collection. Evaluated by Neurosurgery who advised plan for mishel hole tomorrow with possible craniotomy and ICU monitoring for q1 neurochecks.    He was monitored in the ICU overnight , was stable and underwent [place] hole surgery today.         -------------------------------------------------------------------------------------------    Current workup in progress:  This is a 65 year old male Cantonese speaking with PMHx of GERD not on any medications who presented with left sided weakness associated with off balanced gait for 2-3 days.    #Left sided weakness and ataxia secondary to Subdural hematoma   - Noted 2.5cm SDH on CT with compression of Right brain and 1.6cm midline shift  - NIHSS 0 right now   - Neurosurgery following;  mishel hole.   - Keppra 750mg BID   - NO anticoagulation   - NO dextrose containing products  - BP goal: -150  - HOB at 30 degress  - Neuro checks q1 hour    #GERD  - Not on any medications at home  - Start on Prtotonix    Activity: As tolerated  Diet: NPO for procedure  DVT ppx: Sequentials  GI ppx: Protonix  Code Status: Full Code  DISPO: YOMAIRA    SIGN OUT AT 06-29-20 @ 11:32 GIVEN TO: [id number]

## 2020-06-29 NOTE — CHART NOTE - NSCHARTNOTEFT_GEN_A_CORE
PACU ANESTHESIA ADMISSION NOTE      Procedure:   Post op diagnosis:      ____  Intubated  TV:______       Rate: ______      FiO2: ______    __x__  Patent Airway    __x__  Full return of protective reflexes    __x__  Full recovery from anesthesia / back to baseline status    Vitals:  T(C): 37.1 (06-29-20 @ 08:22), Max: 37.1 (06-28-20 @ 17:03)  HR: 53 (06-29-20 @ 08:22) (39 - 62)  BP: 110/59 (06-29-20 @ 08:22) (101/54 - 144/67)  RR: 16 (06-29-20 @ 07:56) (15 - 25)  SpO2: 98% (06-29-20 @ 07:56) (98% - 100%)    Mental Status:  __x__ Awake   ___x__ Alert   _____ Drowsy   _____ Sedated    Nausea/Vomiting:  __x__ NO  ______Yes,   See Post - Op Orders          Pain Scale (0-10):  _____    Treatment: ____ None    __x__ See Post - Op/PCA Orders    Post - Operative Fluids:   ____ Oral   __x__ See Post - Op Orders    Plan: Discharge:   ____Home       _____Floor     _____Critical Care    _____  Other:_________________    Comments: Patient had smooth intraoperative event, no anesthesia complication.  PACU Vital signs: HR:    88      BP: 158/78          RR: 16             O2 Sat: 99    %     Temp:36.8

## 2020-06-29 NOTE — PROGRESS NOTE ADULT - ASSESSMENT
64 yo male POD#0 s/p R burrhole for evacuation of SDH    - neurochecks q1h  - CTH tomorrow am  - Keppra x7 days  - pain management  - HOB @30  - Ancef x 24hrs  - ISS  - OOB/PT/rehab    Case d/w Dr. Chavez 66 yo male POD#0 s/p R burrhole for evacuation of SDH    - neurochecks q1h  - CTH tomorrow am  - cont.  Keppra x7 days  - pain management  - HOB @30  - Ancef x 24hrs  - ISS  - OOB/PT/rehab    Case d/w Dr. Chavez

## 2020-06-30 LAB
ANION GAP SERPL CALC-SCNC: 12 MMOL/L — SIGNIFICANT CHANGE UP (ref 7–14)
APTT BLD: 29.9 SEC — SIGNIFICANT CHANGE UP (ref 27–39.2)
BUN SERPL-MCNC: 14 MG/DL — SIGNIFICANT CHANGE UP (ref 10–20)
CALCIUM SERPL-MCNC: 8.7 MG/DL — SIGNIFICANT CHANGE UP (ref 8.5–10.1)
CHLORIDE SERPL-SCNC: 107 MMOL/L — SIGNIFICANT CHANGE UP (ref 98–110)
CK MB CFR SERPL CALC: 1.8 NG/ML — SIGNIFICANT CHANGE UP (ref 0.6–6.3)
CK SERPL-CCNC: 73 U/L — SIGNIFICANT CHANGE UP (ref 0–225)
CO2 SERPL-SCNC: 23 MMOL/L — SIGNIFICANT CHANGE UP (ref 17–32)
CREAT SERPL-MCNC: 0.7 MG/DL — SIGNIFICANT CHANGE UP (ref 0.7–1.5)
GLUCOSE SERPL-MCNC: 112 MG/DL — HIGH (ref 70–99)
HCT VFR BLD CALC: 39.1 % — LOW (ref 42–52)
HGB BLD-MCNC: 13.3 G/DL — LOW (ref 14–18)
INR BLD: 1.06 RATIO — SIGNIFICANT CHANGE UP (ref 0.65–1.3)
MAGNESIUM SERPL-MCNC: 1.9 MG/DL — SIGNIFICANT CHANGE UP (ref 1.8–2.4)
MCHC RBC-ENTMCNC: 32.2 PG — HIGH (ref 27–31)
MCHC RBC-ENTMCNC: 34 G/DL — SIGNIFICANT CHANGE UP (ref 32–37)
MCV RBC AUTO: 94.7 FL — HIGH (ref 80–94)
NRBC # BLD: 0 /100 WBCS — SIGNIFICANT CHANGE UP (ref 0–0)
PHOSPHATE SERPL-MCNC: 3.8 MG/DL — SIGNIFICANT CHANGE UP (ref 2.1–4.9)
PLATELET # BLD AUTO: 242 K/UL — SIGNIFICANT CHANGE UP (ref 130–400)
POTASSIUM SERPL-MCNC: 3.9 MMOL/L — SIGNIFICANT CHANGE UP (ref 3.5–5)
POTASSIUM SERPL-SCNC: 3.9 MMOL/L — SIGNIFICANT CHANGE UP (ref 3.5–5)
PROTHROM AB SERPL-ACNC: 12.2 SEC — SIGNIFICANT CHANGE UP (ref 9.95–12.87)
RBC # BLD: 4.13 M/UL — LOW (ref 4.7–6.1)
RBC # FLD: 14.1 % — SIGNIFICANT CHANGE UP (ref 11.5–14.5)
SODIUM SERPL-SCNC: 142 MMOL/L — SIGNIFICANT CHANGE UP (ref 135–146)
SURGICAL PATHOLOGY STUDY: SIGNIFICANT CHANGE UP
TROPONIN T SERPL-MCNC: <0.01 NG/ML — SIGNIFICANT CHANGE UP
WBC # BLD: 11.19 K/UL — HIGH (ref 4.8–10.8)
WBC # FLD AUTO: 11.19 K/UL — HIGH (ref 4.8–10.8)

## 2020-06-30 PROCEDURE — 99233 SBSQ HOSP IP/OBS HIGH 50: CPT

## 2020-06-30 PROCEDURE — 93306 TTE W/DOPPLER COMPLETE: CPT | Mod: 26

## 2020-06-30 PROCEDURE — 99291 CRITICAL CARE FIRST HOUR: CPT

## 2020-06-30 PROCEDURE — 93010 ELECTROCARDIOGRAM REPORT: CPT

## 2020-06-30 PROCEDURE — 70450 CT HEAD/BRAIN W/O DYE: CPT | Mod: 26

## 2020-06-30 PROCEDURE — 71045 X-RAY EXAM CHEST 1 VIEW: CPT | Mod: 26

## 2020-06-30 RX ORDER — HEPARIN SODIUM 5000 [USP'U]/ML
5000 INJECTION INTRAVENOUS; SUBCUTANEOUS EVERY 8 HOURS
Refills: 0 | Status: DISCONTINUED | OUTPATIENT
Start: 2020-06-30 | End: 2020-07-03

## 2020-06-30 RX ADMIN — LEVETIRACETAM 400 MILLIGRAM(S): 250 TABLET, FILM COATED ORAL at 18:20

## 2020-06-30 RX ADMIN — Medication 650 MILLIGRAM(S): at 18:20

## 2020-06-30 RX ADMIN — Medication 650 MILLIGRAM(S): at 22:44

## 2020-06-30 RX ADMIN — HEPARIN SODIUM 5000 UNIT(S): 5000 INJECTION INTRAVENOUS; SUBCUTANEOUS at 22:44

## 2020-06-30 RX ADMIN — CHLORHEXIDINE GLUCONATE 1 APPLICATION(S): 213 SOLUTION TOPICAL at 06:07

## 2020-06-30 RX ADMIN — HEPARIN SODIUM 5000 UNIT(S): 5000 INJECTION INTRAVENOUS; SUBCUTANEOUS at 15:43

## 2020-06-30 RX ADMIN — PANTOPRAZOLE SODIUM 40 MILLIGRAM(S): 20 TABLET, DELAYED RELEASE ORAL at 06:07

## 2020-06-30 RX ADMIN — Medication 650 MILLIGRAM(S): at 06:06

## 2020-06-30 RX ADMIN — Medication 100 MILLIGRAM(S): at 06:06

## 2020-06-30 RX ADMIN — LEVETIRACETAM 400 MILLIGRAM(S): 250 TABLET, FILM COATED ORAL at 06:28

## 2020-06-30 RX ADMIN — SENNA PLUS 2 TABLET(S): 8.6 TABLET ORAL at 22:44

## 2020-06-30 RX ADMIN — Medication 650 MILLIGRAM(S): at 00:00

## 2020-06-30 NOTE — CHART NOTE - NSCHARTNOTEFT_GEN_A_CORE
Upon Nutritional Assessment by the Registered Dietitian your patient was determined to meet criteria / has evidence of the following diagnosis/diagnoses:          [ ]  Mild Protein Calorie Malnutrition        [ ]  Moderate Protein Calorie Malnutrition        [ ] Severe Protein Calorie Malnutrition        [ ] Unspecified Protein Calorie Malnutrition        [ x] Underweight / BMI <19        [ ] Morbid Obesity / BMI > 40      Findings as based on:  •  Comprehensive nutrition assessment and consultation      Ht: 170.18cm  Wt: 54kg  BMI: 18.7      Treatment:    The following diet has been recommended:  (1) Now that pt has advanced to regular diet, please change it to REGULAR with GERD friendly diet modifier.         PROVIDER Section:     By signing this assessment you are acknowledging and agree with the diagnosis/diagnoses assigned by the Registered Dietitian    Comments:

## 2020-06-30 NOTE — DIETITIAN INITIAL EVALUATION ADULT. - OTHER INFO
p/w subdural hematoma. P/w Off balance gait for 2-3 days, with wt loss and decreased po. Pt s/p burrhole 6/29 in OR. Monitoring neuro status w/ acute 2.5cm R SDH w/ midline shift. c/w keppra. Clear liquids until 6/30. Plan to advance.

## 2020-06-30 NOTE — DIETITIAN INITIAL EVALUATION ADULT. - ADD RECOMMEND
Now that pt has advanced to regular diet, please change it to REGULAR with GERD friendly diet modifier.

## 2020-06-30 NOTE — PROGRESS NOTE ADULT - ASSESSMENT
65M w/ PMH of GERD admitted s/p fall found to have 2.5cm R SDH with 1.6cm midline shift. POD#1 from R mishel hole admitted to SICU for Q1H neuro checks 24 hours post-op.     NEURO:  - acute 2.5cm R SDH w/ 1.6cm midline shift s/p R mishel hole  - Q1H neuro checks for 24 hours post-op  - continue keppra 750mg BID  - 6/30 AM CTH, f/u read  - no dextrose containing products  - HOB at 30 degrees    RESP:   - stable on room air  - PMH of smoking    CARDS:   - hemodynamically stable  - BP goal: -150  - IV labetalol 10mg Q6H PRN  - troponin 0.01, FU 8pm and 430 set    GI/NUTR:   - CLD until 6/30  - plan to advance in AM  - PMH GERD not on medications  - GI PPX: PTX  - bowel regimen: senna    /RENAL:   - Monitor electrolytes and replete as needed  - strict I/Os  - no acute issues, BUN/Cr 22/0.8 --> 20/0.7-->14/0.7    HEME/ONC:   - DVT PPX: SCDs, holding HSQ until 6/30 AM  - Hemoglobin 13.9 > 14.8 >13.3    ID:  - 24 hours of post-op ancef 2g  - afebrile  - WBC 7.65 preop > 9.18 postop > 11.19    ENDO:  - no chronic diagnoses  - FS before meals and at bedtime  - Glucose, Serum: 105 (06-29 @ 04:58)    LINES/DRAINS: PIV    ACTIVITY: bedrest until 6/30 AM  DISPO: SICU, approved by Dr. Collado    DVT Prophylaxis: SCDs    GI Prophylaxis:pantoprazole    Tablet 40 milliGRAM(s) Oral before breakfast    ***Tubes/Lines/Drains  ***  Peripheral IV    [X] A ten-point review of systems was otherwise negative except as noted above.  [  ] Due to altered mental status/intubation, subjective information was not attained from the patient. History was obtained, to the extent possible, from review of the chart and collateral sources of information. 65M w/ PMH of GERD admitted s/p fall found to have 2.5cm R SDH with 1.6cm midline shift. POD#1 from R mishel hole admitted to SICU for Q1H neuro checks 24 hours post-op.     NEURO:  - acute 2.5cm R SDH w/ 1.6cm midline shift s/p R mishel hole  - Q1H neuro checks for 24 hours post-op  - continue keppra 750mg BID  - 6/30 AM CTH, f/u read  - no dextrose containing products  - HOB at 30 degrees    RESP:   - stable on room air  - PMH of smoking    CARDS:   - hemodynamically stable  - BP goal: -150  - IV labetalol 10mg Q6H PRN  - troponin 0.01, FU 8pm and 430 set    GI/NUTR:   - CLD until 6/30  - plan to advance in AM  - PMH GERD not on medications  - GI PPX: PTX  - bowel regimen: senna    /RENAL:   - Monitor electrolytes and replete as needed  - strict I/Os  - no acute issues, BUN/Cr 22/0.8 --> 20/0.7-->14/0.7    HEME/ONC:   - DVT PPX: SCDs, holding HSQ until 6/30 AM  - Hemoglobin 13.9 > 14.8 >13.3    ID:  - 24 hours of post-op ancef 2g  - afebrile  - WBC 7.65 preop > 9.18 postop > 11.19    ENDO:  - no chronic diagnoses  - FS before meals and at bedtime  - Glucose, Serum: 105 (06-29 @ 04:58)    LINES/DRAINS: PIV    ACTIVITY: bedrest until 6/30 AM  DISPO: downgrade to tele    DVT Prophylaxis: SCDs    GI Prophylaxis:pantoprazole    Tablet 40 milliGRAM(s) Oral before breakfast    ***Tubes/Lines/Drains  ***  Peripheral IV    [X] A ten-point review of systems was otherwise negative except as noted above.  [  ] Due to altered mental status/intubation, subjective information was not attained from the patient. History was obtained, to the extent possible, from review of the chart and collateral sources of information.

## 2020-06-30 NOTE — PROGRESS NOTE ADULT - SUBJECTIVE AND OBJECTIVE BOX
Subjective: 65yMale with a pmhx of SUBDURAL HEMATOMA WEAKNESS ATAXIA  ^STROKE SYMPTOMS  No pertinent family history in first degree relatives  Handoff  MEWS Score  Chronic GERD  Subdural hematoma  Subdural hematoma  Subdural hematoma  Creation of mishel hole on right side  No significant past surgical history  STROKE SYMPTOMS  Ataxia  Weakness    POD#1  s/p Right mishel hole for evacuation of subacute SDH    Pt seen and examined at bedside in ICU.  Pt found alert and awake.  Follows commands readily. Denies headache dizziness.    Post op CT head done showing some residual pneumocephalus.      Allergies    No Known Allergies    Intolerances        Vital Signs Last 24 Hrs  T(C): 36.4 (30 Jun 2020 04:00), Max: 36.8 (29 Jun 2020 20:00)  T(F): 97.5 (30 Jun 2020 04:00), Max: 98.3 (29 Jun 2020 20:00)  HR: 44 (30 Jun 2020 09:00) (40 - 92)  BP: 135/58 (30 Jun 2020 09:00) (115/50 - 186/81)  BP(mean): 76 (30 Jun 2020 09:00) (71 - 119)  RR: 19 (30 Jun 2020 09:00) (11 - 28)  SpO2: 98% (30 Jun 2020 09:00) (97% - 100%)      acetaminophen   Tablet .. 650 milliGRAM(s) Oral every 6 hours  chlorhexidine 4% Liquid 1 Application(s) Topical <User Schedule>  lactated ringers. 1000 milliLiter(s) IV Continuous <Continuous>  levETIRAcetam  IVPB 750 milliGRAM(s) IV Intermittent every 12 hours  oxyCODONE    IR 5 milliGRAM(s) Oral every 6 hours PRN  pantoprazole    Tablet 40 milliGRAM(s) Oral before breakfast  senna 2 Tablet(s) Oral at bedtime        06-29-20 @ 07:01  -  06-30-20 @ 07:00  --------------------------------------------------------  IN: 1215 mL / OUT: 940 mL / NET: 275 mL    06-30-20 @ 07:01  -  06-30-20 @ 09:39  --------------------------------------------------------  IN: 175 mL / OUT: 0 mL / NET: 175 mL        REVIEW OF SYSTEMS    [x ] A ten-point review of systems was otherwise negative except as noted.  [ ] Due to altered mental status/intubation, subjective information were not able to be obtained from the patient. History was obtained, to the extent possible, from review of the chart and collateral sources of information.      Exam:  Awake and alert. Verbal function intact  tongue midline  no droop  pupils reactive  tracking  Pronator Drift: none  Motor: MAEx4  Sensation: intact to touch         CBC Full  -  ( 30 Jun 2020 00:30 )  WBC Count : 11.19 K/uL  RBC Count : 4.13 M/uL  Hemoglobin : 13.3 g/dL  Hematocrit : 39.1 %  Platelet Count - Automated : 242 K/uL  Mean Cell Volume : 94.7 fL  Mean Cell Hemoglobin : 32.2 pg  Mean Cell Hemoglobin Concentration : 34.0 g/dL  Auto Neutrophil # : x  Auto Lymphocyte # : x  Auto Monocyte # : x  Auto Eosinophil # : x  Auto Basophil # : x  Auto Neutrophil % : x  Auto Lymphocyte % : x  Auto Monocyte % : x  Auto Eosinophil % : x  Auto Basophil % : x    06-30    142  |  107  |  14  ----------------------------<  112<H>  3.9   |  23  |  0.7    Ca    8.7      30 Jun 2020 00:30  Phos  3.8     06-30  Mg     1.9     06-30    TPro  6.8  /  Alb  4.2  /  TBili  0.4  /  DBili  x   /  AST  18  /  ALT  8   /  AlkPhos  77  06-28    PT/INR - ( 30 Jun 2020 00:30 )   PT: 12.20 sec;   INR: 1.06 ratio         PTT - ( 30 Jun 2020 00:30 )  PTT:29.9 sec        Wound: clean and intact    Imaging: < from: CT Head No Cont (06.30.20 @ 07:39) >    IMPRESSION:     1. Interval operative changes as detailed above with extra-axial air and mixed density hemorrhage with persistent mass effect on the right frontal lobe and leftward midline shift of approximately 6.7 mm (previously 1.3 cm).  2. Interval occurring hypoattenuation involving the right frontal gray-white junction. MRI of the brain is suggested to further evaluate.  3. Slight interval increase in size of a high left parietal subdural hemorrhage.  < end of copied text >      Assessment/Plan:   Pt doing well POD#1 s/p Mishel hole  OK for OOB to chair  PT/rehab  OK for SQ Hep  please give 100% NRM for pneumocephalus  repeat CT head tomorrow  cont Keppra  OK for downgrade

## 2020-06-30 NOTE — CONSULT NOTE ADULT - ASSESSMENT
Cardiologist: NONE    66 yo Cantonese-speaking M w/ PMH of GERD not on medications who presented on 6/28 with 2-3 days of left sided weakness and ataxia. Pt reportedly hit his head 1 week prior to admission. CT head notable for 2.5cm right subdural isodensity representing subacute subdural hematoma with 1.6cm midline shift to the left. Pt was taken to the OR for R sided mishel hole 6/29. Now noted to be bradycardic to 40s.     Impression:  Sinus bradycardiac 42 BPM  EKG 6/28 with RBBB, rate related  SDH s/p Rt side Mishel hole on 6/29 ; Repeat HCT 6/30 worsening mass effect and increasing SDH  COVID neg 6/28    Plan:  - If patient remains bradycardic on discharge, will send home with [facility] for further cardiac monitoring  - Cont tele while admitted  - Avoid AV meryl agents  - Appreciate neurosurgery recommendations  - Check TSH  - Check echo Cardiologist: NONE    66 yo Cantonese-speaking M w/ PMH of GERD not on medications who presented on 6/28 with 2-3 days of left sided weakness and ataxia. Pt reportedly hit his head 1 week prior to admission. CT head notable for 2.5cm right subdural isodensity representing subacute subdural hematoma with 1.6cm midline shift to the left. Pt was taken to the OR for R sided mishel hole 6/29. Now noted to be bradycardic to 40s.     Impression:  Sinus bradycardia (40s) but asymptomatic BPM  EKG 6/28 with RBBB, likely rate related  SDH s/p Rt side Mishel hole on 6/29 ; Repeat HCT 6/30 worsening mass effect and increasing SDH  COVID neg 6/28    Plan:  - If patient remains bradycardic at time of discharge, will send home with [facility] for further cardiac monitoring  - Cont tele while admitted  - Avoid AV meryl blocking agents  - Appreciate neurosurgery recommendations  - Check TSH and free T4  - Check 2D echo to evaluate for structural heart disease  - Outpatient pulm consult for sleep apnea evaluation

## 2020-06-30 NOTE — PHARMACOTHERAPY INTERVENTION NOTE - COMMENTS
d/w surgical team to evaluate labetalol 10mg IV q6h prn SBP>150  -pt didn't require any labetalol overnight, d/c

## 2020-06-30 NOTE — DIETITIAN INITIAL EVALUATION ADULT. - ENERGY NEEDS
1540- 1667 kcal/day (MSJ x 1.2-1.3)  59-70g/day (1.1-1.3 g/kg of ABW) - to preserve lean body mass, aim higher  Fluid per ICU team

## 2020-06-30 NOTE — PROGRESS NOTE ADULT - SUBJECTIVE AND OBJECTIVE BOX
RAISA PAZ [facility]  [id number]    66 yo Cantonese-speaking M w/ PMH of GERD not on medications who presented on  with 2-3 days of left sided weakness and ataxia. Pt reportedly hit his head 1 week prior to admission; denies LOC. Family brought pt in to the ED because he was having worsening L sided weakness. In the ED, pt was hemodynamically stable: /58, HR 62, T98.8, sat 98% on room air. Initial NIHSS ranged from 0-2 on exam. CT head notable for 2.5cm right subdural isodensity representing subacute subdural hematoma with 1.6cm midline shift to the left. Noted acute on subacute hemorrhage with noted focal hyerdensities within the subdural collection. He was admitted to the MICU for Q1H neuro checks. Pt was taken to the OR for R sided mishel hole . SICU is consulted for Q1H neuro checks s/p R sided mishel hole. Pt transferred from medicine to neurosurgery service.       Indication for ICU admission: acute on subacute R SDH w/ MLS s/p R mishel hole   Admit Date:20  ICU Date:   OR Date:     No Known Allergies    PAST MEDICAL & SURGICAL HISTORY:  Chronic GERD  No significant past surgical history    Home Medications:  Multiple Vitamins oral tablet: 1 tab(s) orally once a day (2020 20:40)      24HRS EVENT:  Overnight events: PO zofran for nausea    NEURO:  - acute 2.5cm R SDH w/ 1.6cm midline shift s/p R mishel hole  - Q1H neuro checks for 24 hours post-op  - continue keppra 750mg BID  -  AM CTH, f/u read  - no dextrose containing products  - HOB at 30 degrees    RESP:   - stable on room air  - PMH of smoking    CARDS:   - hemodynamically stable  - BP goal: -150  - IV labetalol 10mg Q6H PRN  - troponin 0.01, FU 8pm and 430 set    GI/NUTR:   - CLD until   - plan to advance in AM  - PMH GERD not on medications  - GI PPX: PTX  - bowel regimen: senna    /RENAL:   - Monitor electrolytes and replete as needed  - strict I/Os  - no acute issues, BUN/Cr 22/0.8 --> 20/0.7-->14/0.7    HEME/ONC:   - DVT PPX: SCDs, holding HSQ until  AM  - Hemoglobin 13.9 > 14.8 >13.3    ID:  - 24 hours of post-op ancef 2g  - afebrile  - WBC 7.65 preop > 9.18 postop > 11.19    ENDO:  - no chronic diagnoses  - FS before meals and at bedtime  - Glucose, Serum: 105 ( @ 04:58)    LINES/DRAINS: PIV    ACTIVITY: bedrest until  AM  DISPO: SICU, approved by Dr. Collado    DVT Prophylaxis: SCDs    GI Prophylaxis:pantoprazole    Tablet 40 milliGRAM(s) Oral before breakfast    ***Tubes/Lines/Drains  ***  Peripheral IV    [X] A ten-point review of systems was otherwise negative except as noted above.  [  ] Due to altered mental status/intubation, subjective information was not attained from the patient. History was obtained, to the extent possible, from review of the chart and collateral sources of information.  Daily Height in cm: 170.18 (2020 07:33)    Daily Weight in k.2 (2020 04:00)    Diet, Full Liquid (20 @ 10:48)  Diet, Regular (20 @ 10:48)  Diet, Clear Liquid (20 @ 12:48)      CURRENT MEDS:  Neurologic Medications  acetaminophen   Tablet .. 650 milliGRAM(s) Oral every 6 hours  levETIRAcetam  IVPB 750 milliGRAM(s) IV Intermittent every 12 hours  oxyCODONE    IR 5 milliGRAM(s) Oral every 6 hours PRN Moderate Pain (4 - 6)    Respiratory Medications    Cardiovascular Medications  labetalol Injectable 10 milliGRAM(s) IV Push every 6 hours PRN Systolic blood pressure > 150    Gastrointestinal Medications  lactated ringers. 1000 milliLiter(s) IV Continuous <Continuous>  pantoprazole    Tablet 40 milliGRAM(s) Oral before breakfast  senna 2 Tablet(s) Oral at bedtime    Genitourinary Medications    Hematologic/Oncologic Medications    Antimicrobial/Immunologic Medications  ceFAZolin   IVPB 2000 milliGRAM(s) IV Intermittent every 8 hours    Endocrine/Metabolic Medications    Topical/Other Medications  chlorhexidine 4% Liquid 1 Application(s) Topical <User Schedule>      ICU Vital Signs Last 24 Hrs  T(C): 36.8 (2020 20:00), Max: 37.1 (2020 07:56)  T(F): 98.3 (2020 20:00), Max: 98.7 (2020 07:56)  HR: 92 (2020 00:00) (39 - 92)  BP: 151/68 (2020 00:00) (101/59 - 178/103)  BP(mean): 95 (2020 00:00) (71 - 119)  ABP: --  ABP(mean): --  RR: 28 (2020 00:00) (11 - 28)  SpO2: 97% (2020 00:00) (97% - 100%)      Adult Advanced Hemodynamics Last 24 Hrs  CVP(mm Hg): --  CVP(cm H2O): --  CO: --  CI: --  PA: --  PA(mean): --  PCWP: --  SVR: --  SVRI: --  PVR: --  PVRI: --          I&O's Summary    2020 07:  -  2020 07:00  --------------------------------------------------------  IN: 0 mL / OUT: 100 mL / NET: -100 mL      -  2020 03:06  --------------------------------------------------------  IN: 1140 mL / OUT: 940 mL / NET: 200 mL      I&O's Detail    :  -  2020 07:00  --------------------------------------------------------  IN:  Total IN: 0 mL    OUT:    Voided: 100 mL  Total OUT: 100 mL    Total NET: -100 mL      2020 07:01  -  2020 03:06  --------------------------------------------------------  IN:    IV PiggyBack: 200 mL    lactated ringers.: 190 mL    lactated ringers.: 750 mL  Total IN: 1140 mL    OUT:    Voided: 940 mL  Total OUT: 940 mL    Total NET: 200 mL    PHYSICAL EXAM:    General/Neuro  alert & oriented x 3, no focal deficits      Lungs:      clear to auscultation, Normal expansion/effort.     Cardiovascular : S1, S2.  Regular rate and rhythm.   Cardiac Rhythm: Normal Sinus Rhythm    GI: Abdomen soft, Non-tender, Non-distended.      Extremities: Extremities warm, pink, well-perfused.     Derm: Good skin turgor, no skin breakdown.      CXR:       LABS:  CAPILLARY BLOOD GLUCOSE                              13.3   11.19 )-----------( 242      ( 2020 00:30 )             39.1       06-30    142  |  107  |  14  ----------------------------<  112<H>  3.9   |  23  |  0.7    Ca    8.7      2020 00:30  Phos  3.8     30  Mg     1.9     30    TPro  6.8  /  Alb  4.2  /  TBili  0.4  /  DBili  x   /  AST  18  /  ALT  8   /  AlkPhos  77  28      PT/INR - ( 2020 00:30 )   PT: 12.20 sec;   INR: 1.06 ratio         PTT - ( 2020 00:30 )  PTT:29.9 sec  CARDIAC MARKERS ( 2020 21:19 )  x     / <0.01 ng/mL / 90 U/L / x     / 2.2 ng/mL  CARDIAC MARKERS ( 2020 13:02 )  x     / <0.01 ng/mL / 99 U/L / x     / 2.6 ng/mL  CARDIAC MARKERS ( 2020 17:40 )  x     / <0.01 ng/mL / x     / x     / x          Urinalysis Basic - ( 2020 20:59 )    Color: Light Yellow / Appearance: Clear / SG: >1.050 / pH: x  Gluc: x / Ketone: Negative  / Bili: Negative / Urobili: <2 mg/dL   Blood: x / Protein: 30 mg/dL / Nitrite: Negative   Leuk Esterase: Negative / RBC: 2 /HPF / WBC 0 /HPF   Sq Epi: x / Non Sq Epi: 0 /HPF / Bacteria: 0.0        Culture - Urine (collected 2020 20:59)  Source: .Urine Clean Catch (Midstream)  Final Report (2020 23:25):    <10,000 CFU/mL Normal Urogenital Vivienne RAISA PAZ [facility]  [id number]    66 yo Cantonese-speaking M w/ PMH of GERD not on medications who presented on  with 2-3 days of left sided weakness and ataxia. Pt reportedly hit his head 1 week prior to admission; denies LOC. Family brought pt in to the ED because he was having worsening L sided weakness. In the ED, pt was hemodynamically stable: /58, HR 62, T98.8, sat 98% on room air. Initial NIHSS ranged from 0-2 on exam. CT head notable for 2.5cm right subdural isodensity representing subacute subdural hematoma with 1.6cm midline shift to the left. Noted acute on subacute hemorrhage with noted focal hyerdensities within the subdural collection. He was admitted to the MICU for Q1H neuro checks. Pt was taken to the OR for R sided mishel hole . SICU is consulted for Q1H neuro checks s/p R sided mishel hole. Pt transferred from medicine to neurosurgery service.       Indication for ICU admission: acute on subacute R SDH w/ MLS s/p R mishel hole   Admit Date:20  ICU Date:   OR Date:     No Known Allergies    PAST MEDICAL & SURGICAL HISTORY:  Chronic GERD  No significant past surgical history    Home Medications:  Multiple Vitamins oral tablet: 1 tab(s) orally once a day (2020 20:40)      24HRS EVENT:  Overnight events: no overnight events    NEURO:  - acute 2.5cm R SDH w/ 1.6cm midline shift s/p R mishel hole  - Q1H neuro checks for 24 hours post-op  - continue keppra 750mg BID  -  AM CTH, f/u read  - no dextrose containing products  - HOB at 30 degrees    RESP:   - stable on room air  - PMH of smoking    CARDS:   - hemodynamically stable  - BP goal: -150  - IV labetalol 10mg Q6H PRN  - troponin 0.01, FU 8pm and 430 set    GI/NUTR:   - CLD until   - plan to advance in AM  - PMH GERD not on medications  - GI PPX: PTX  - bowel regimen: senna    /RENAL:   - Monitor electrolytes and replete as needed  - strict I/Os  - no acute issues, BUN/Cr 22/0.8 --> 20/0.7-->14/0.7    HEME/ONC:   - DVT PPX: SCDs, holding HSQ until  AM  - Hemoglobin 13.9 > 14.8 >13.3    ID:  - 24 hours of post-op ancef 2g  - afebrile  - WBC 7.65 preop > 9.18 postop > 11.19    ENDO:  - no chronic diagnoses  - FS before meals and at bedtime  - Glucose, Serum: 105 ( @ 04:58)    LINES/DRAINS: PIV    ACTIVITY: bedrest until  AM  DISPO: SICU, approved by Dr. Collado    DVT Prophylaxis: SCDs    GI Prophylaxis:pantoprazole    Tablet 40 milliGRAM(s) Oral before breakfast    ***Tubes/Lines/Drains  ***  Peripheral IV    [X] A ten-point review of systems was otherwise negative except as noted above.  [  ] Due to altered mental status/intubation, subjective information was not attained from the patient. History was obtained, to the extent possible, from review of the chart and collateral sources of information.  Daily Height in cm: 170.18 (2020 07:33)    Daily Weight in k.2 (2020 04:00)    Diet, Full Liquid (20 @ 10:48)  Diet, Regular (20 @ 10:48)  Diet, Clear Liquid (20 @ 12:48)      CURRENT MEDS:  Neurologic Medications  acetaminophen   Tablet .. 650 milliGRAM(s) Oral every 6 hours  levETIRAcetam  IVPB 750 milliGRAM(s) IV Intermittent every 12 hours  oxyCODONE    IR 5 milliGRAM(s) Oral every 6 hours PRN Moderate Pain (4 - 6)    Respiratory Medications    Cardiovascular Medications  labetalol Injectable 10 milliGRAM(s) IV Push every 6 hours PRN Systolic blood pressure > 150    Gastrointestinal Medications  lactated ringers. 1000 milliLiter(s) IV Continuous <Continuous>  pantoprazole    Tablet 40 milliGRAM(s) Oral before breakfast  senna 2 Tablet(s) Oral at bedtime    Genitourinary Medications    Hematologic/Oncologic Medications    Antimicrobial/Immunologic Medications  ceFAZolin   IVPB 2000 milliGRAM(s) IV Intermittent every 8 hours    Endocrine/Metabolic Medications    Topical/Other Medications  chlorhexidine 4% Liquid 1 Application(s) Topical <User Schedule>      ICU Vital Signs Last 24 Hrs  T(C): 36.8 (2020 20:00), Max: 37.1 (2020 07:56)  T(F): 98.3 (2020 20:00), Max: 98.7 (2020 07:56)  HR: 92 (2020 00:00) (39 - 92)  BP: 151/68 (2020 00:00) (101/59 - 178/103)  BP(mean): 95 (2020 00:00) (71 - 119)  ABP: --  ABP(mean): --  RR: 28 (2020 00:00) (11 - 28)  SpO2: 97% (2020 00:00) (97% - 100%)      Adult Advanced Hemodynamics Last 24 Hrs  CVP(mm Hg): --  CVP(cm H2O): --  CO: --  CI: --  PA: --  PA(mean): --  PCWP: --  SVR: --  SVRI: --  PVR: --  PVRI: --          I&O's Summary    2020 07:  -  2020 07:00  --------------------------------------------------------  IN: 0 mL / OUT: 100 mL / NET: -100 mL    :  -  2020 03:06  --------------------------------------------------------  IN: 1140 mL / OUT: 940 mL / NET: 200 mL      I&O's Detail    2020 07:  -  2020 07:00  --------------------------------------------------------  IN:  Total IN: 0 mL    OUT:    Voided: 100 mL  Total OUT: 100 mL    Total NET: -100 mL      2020 07:01  -  2020 03:06  --------------------------------------------------------  IN:    IV PiggyBack: 200 mL    lactated ringers.: 190 mL    lactated ringers.: 750 mL  Total IN: 1140 mL    OUT:    Voided: 940 mL  Total OUT: 940 mL    Total NET: 200 mL    PHYSICAL EXAM:    General/Neuro  alert & oriented x 3, no focal deficits      Lungs:      clear to auscultation, Normal expansion/effort.     Cardiovascular : S1, S2.  Regular rate and rhythm.   Cardiac Rhythm: Normal Sinus Rhythm    GI: Abdomen soft, Non-tender, Non-distended.      Extremities: Extremities warm, pink, well-perfused.     Derm: Good skin turgor, no skin breakdown.      CXR:       LABS:  CAPILLARY BLOOD GLUCOSE                              13.3   11.19 )-----------( 242      ( 2020 00:30 )             39.1       06-30    142  |  107  |  14  ----------------------------<  112<H>  3.9   |  23  |  0.7    Ca    8.7      2020 00:30  Phos  3.8     30  Mg     1.9     30    TPro  6.8  /  Alb  4.2  /  TBili  0.4  /  DBili  x   /  AST  18  /  ALT  8   /  AlkPhos  77  06-28      PT/INR - ( 2020 00:30 )   PT: 12.20 sec;   INR: 1.06 ratio         PTT - ( 2020 00:30 )  PTT:29.9 sec  CARDIAC MARKERS ( 2020 21:19 )  x     / <0.01 ng/mL / 90 U/L / x     / 2.2 ng/mL  CARDIAC MARKERS ( 2020 13:02 )  x     / <0.01 ng/mL / 99 U/L / x     / 2.6 ng/mL  CARDIAC MARKERS ( 2020 17:40 )  x     / <0.01 ng/mL / x     / x     / x          Urinalysis Basic - ( 2020 20:59 )    Color: Light Yellow / Appearance: Clear / SG: >1.050 / pH: x  Gluc: x / Ketone: Negative  / Bili: Negative / Urobili: <2 mg/dL   Blood: x / Protein: 30 mg/dL / Nitrite: Negative   Leuk Esterase: Negative / RBC: 2 /HPF / WBC 0 /HPF   Sq Epi: x / Non Sq Epi: 0 /HPF / Bacteria: 0.0        Culture - Urine (collected 2020 20:59)  Source: .Urine Clean Catch (Midstream)  Final Report (2020 23:25):    <10,000 CFU/mL Normal Urogenital Vivienne RAISA PAZ [facility]  [id number]    64 yo Cantonese-speaking M w/ PMH of GERD not on medications who presented on  with 2-3 days of left sided weakness and ataxia. Pt reportedly hit his head 1 week prior to admission; denies LOC. Family brought pt in to the ED because he was having worsening L sided weakness. In the ED, pt was hemodynamically stable: /58, HR 62, T98.8, sat 98% on room air. Initial NIHSS ranged from 0-2 on exam. CT head notable for 2.5cm right subdural isodensity representing subacute subdural hematoma with 1.6cm midline shift to the left. Noted acute on subacute hemorrhage with noted focal hyerdensities within the subdural collection. He was admitted to the MICU for Q1H neuro checks. Pt was taken to the OR for R sided mishel hole . SICU is consulted for Q1H neuro checks s/p R sided mishel hole. Pt transferred from medicine to neurosurgery service.       Indication for ICU admission: acute on subacute R SDH w/ MLS s/p R mishel hole   Admit Date:20  ICU Date:   OR Date:     No Known Allergies    PAST MEDICAL & SURGICAL HISTORY:  Chronic GERD  No significant past surgical history    Home Medications:  Multiple Vitamins oral tablet: 1 tab(s) orally once a day (2020 20:40)      24HRS EVENT:  Overnight events: no overnight events    NEURO:  - acute 2.5cm R SDH w/ 1.6cm midline shift s/p R mishel hole  - Q1H neuro checks for 24 hours post-op  - continue keppra 750mg BID  -  AM CTH, f/u read  - no dextrose containing products  - HOB at 30 degrees    RESP:   - stable on room air  - PMH of smoking    CARDS:   - hemodynamically stable, loren, f/u cardio   - BP goal: -150  - d/c labetolol prn  - troponin 0.01, FU 8pm and 430 set    GI/NUTR:   - CLD until   - plan to advance in AM  - PMH GERD not on medications  - GI PPX: PTX  - bowel regimen: senna    /RENAL:   - Monitor electrolytes and replete as needed  - strict I/Os  - no acute issues, BUN/Cr 22/0.8 --> 20/0.7-->14/0.7    HEME/ONC:   - DVT PPX: SCDs, holding HSQ until  AM  - Hemoglobin 13.9 > 14.8 >13.3    ID:  - 24 hours of post-op ancef 2g  - afebrile  - WBC 7.65 preop > 9.18 postop > 11.19    ENDO:  - no chronic diagnoses  - FS before meals and at bedtime  - Glucose, Serum: 105 ( @ 04:58)    LINES/DRAINS: PIV    ACTIVITY: bedrest until  AM  DISPO: downgrade    DVT Prophylaxis: SCDs    GI Prophylaxis:pantoprazole    Tablet 40 milliGRAM(s) Oral before breakfast    ***Tubes/Lines/Drains  ***  Peripheral IV    [X] A ten-point review of systems was otherwise negative except as noted above.  [  ] Due to altered mental status/intubation, subjective information was not attained from the patient. History was obtained, to the extent possible, from review of the chart and collateral sources of information.  Daily Height in cm: 170.18 (2020 07:33)    Daily Weight in k.2 (2020 04:00)    Diet, Full Liquid (20 @ 10:48)  Diet, Regular (20 @ 10:48)  Diet, Clear Liquid (20 @ 12:48)      CURRENT MEDS:  Neurologic Medications  acetaminophen   Tablet .. 650 milliGRAM(s) Oral every 6 hours  levETIRAcetam  IVPB 750 milliGRAM(s) IV Intermittent every 12 hours  oxyCODONE    IR 5 milliGRAM(s) Oral every 6 hours PRN Moderate Pain (4 - 6)    Respiratory Medications    Cardiovascular Medications  labetalol Injectable 10 milliGRAM(s) IV Push every 6 hours PRN Systolic blood pressure > 150    Gastrointestinal Medications  lactated ringers. 1000 milliLiter(s) IV Continuous <Continuous>  pantoprazole    Tablet 40 milliGRAM(s) Oral before breakfast  senna 2 Tablet(s) Oral at bedtime    Genitourinary Medications    Hematologic/Oncologic Medications    Antimicrobial/Immunologic Medications  ceFAZolin   IVPB 2000 milliGRAM(s) IV Intermittent every 8 hours    Endocrine/Metabolic Medications    Topical/Other Medications  chlorhexidine 4% Liquid 1 Application(s) Topical <User Schedule>      ICU Vital Signs Last 24 Hrs  T(C): 36.8 (2020 20:00), Max: 37.1 (2020 07:56)  T(F): 98.3 (2020 20:00), Max: 98.7 (2020 07:56)  HR: 92 (2020 00:00) (39 - 92)  BP: 151/68 (2020 00:00) (101/59 - 178/103)  BP(mean): 95 (2020 00:00) (71 - 119)  ABP: --  ABP(mean): --  RR: 28 (2020 00:00) (11 - 28)  SpO2: 97% (2020 00:00) (97% - 100%)      Adult Advanced Hemodynamics Last 24 Hrs  CVP(mm Hg): --  CVP(cm H2O): --  CO: --  CI: --  PA: --  PA(mean): --  PCWP: --  SVR: --  SVRI: --  PVR: --  PVRI: --          I&O's Summary      -  2020 07:00  --------------------------------------------------------  IN: 0 mL / OUT: 100 mL / NET: -100 mL      -  2020 03:06  --------------------------------------------------------  IN: 1140 mL / OUT: 940 mL / NET: 200 mL      I&O's Detail      -  2020 07:00  --------------------------------------------------------  IN:  Total IN: 0 mL    OUT:    Voided: 100 mL  Total OUT: 100 mL    Total NET: -100 mL      :  -  2020 03:06  --------------------------------------------------------  IN:    IV PiggyBack: 200 mL    lactated ringers.: 190 mL    lactated ringers.: 750 mL  Total IN: 1140 mL    OUT:    Voided: 940 mL  Total OUT: 940 mL    Total NET: 200 mL    PHYSICAL EXAM:    General/Neuro  alert & oriented x 3, no focal deficits      Lungs:      clear to auscultation, Normal expansion/effort.     Cardiovascular : S1, S2.  Regular rate and rhythm.   Cardiac Rhythm: Normal Sinus Rhythm    GI: Abdomen soft, Non-tender, Non-distended.      Extremities: Extremities warm, pink, well-perfused.     Derm: Good skin turgor, no skin breakdown.      CXR:       LABS:  CAPILLARY BLOOD GLUCOSE                              13.3   11.19 )-----------( 242      ( 2020 00:30 )             39.1       06-30    142  |  107  |  14  ----------------------------<  112<H>  3.9   |  23  |  0.7    Ca    8.7      2020 00:30  Phos  3.8     30  Mg     1.9     30    TPro  6.8  /  Alb  4.2  /  TBili  0.4  /  DBili  x   /  AST  18  /  ALT  8   /  AlkPhos  77  06-28      PT/INR - ( 2020 00:30 )   PT: 12.20 sec;   INR: 1.06 ratio         PTT - ( 2020 00:30 )  PTT:29.9 sec  CARDIAC MARKERS ( 2020 21:19 )  x     / <0.01 ng/mL / 90 U/L / x     / 2.2 ng/mL  CARDIAC MARKERS ( 2020 13:02 )  x     / <0.01 ng/mL / 99 U/L / x     / 2.6 ng/mL  CARDIAC MARKERS ( 2020 17:40 )  x     / <0.01 ng/mL / x     / x     / x          Urinalysis Basic - ( 2020 20:59 )    Color: Light Yellow / Appearance: Clear / SG: >1.050 / pH: x  Gluc: x / Ketone: Negative  / Bili: Negative / Urobili: <2 mg/dL   Blood: x / Protein: 30 mg/dL / Nitrite: Negative   Leuk Esterase: Negative / RBC: 2 /HPF / WBC 0 /HPF   Sq Epi: x / Non Sq Epi: 0 /HPF / Bacteria: 0.0        Culture - Urine (collected 2020 20:59)  Source: .Urine Clean Catch (Midstream)  Final Report (2020 23:25):    <10,000 CFU/mL Normal Urogenital Vivienne

## 2020-06-30 NOTE — PROGRESS NOTE ADULT - ATTENDING COMMENTS
Patient remains neuro intact.  GCS 15.  CT head from this am reviewed.    ASSESSMENT:  66 y/o male with Right Subdural Hematoma.  S/p [place] hole.  Left hemiparesis.  Atelectasis.  Acute postoperative pain.    PLAN:  - continue Keppra.  - intermittent neuro checks  - pain control  - PT  - DVT prophylaxis with SCDs only for now

## 2020-06-30 NOTE — DIETITIAN INITIAL EVALUATION ADULT. - REASON INDICATOR FOR ASSESSMENT
Found patient with BMI<19 by RD screen - pt is alert and oriented, spoken in Cantonese, no edema. LBM 2 days ago (PTA), and surgical incision. Generally no chew/swallowing issue PTA per pt. Was on Clear liquid diet and tolerated this morning. As of Noon today pt has advanced to regular diet.

## 2020-06-30 NOTE — CONSULT NOTE ADULT - SUBJECTIVE AND OBJECTIVE BOX
Patient is a 65y old  Male who presents with a chief complaint of Subdural Hematoma (30 Jun 2020 09:38)    HPI:  This is a 65 year old male Cantonese speaking with PMHx of GERD not on any medications who presented with left sided weakness associated with off balanced gait for 2-3 days. The son also reports pt has had weight loss and decreased appetite. Of note in talking with the son the patient admits to hitting his head about one week ago but reported no loss of conscious with it. On the day of presentation he was having worsening left sided weakness with ataxia and unbalanced gait.     In the ED /58, HR 62, T98.8, sat 98% on room air. Initial NIHSS reported 2 but was 0 on my examination. CT head noted for 2.5cm right subdural isodensity representing subacute subdural hematoma with 1.6cm midline shift to the left. Noted acute on subacute hemorrhage with noted focal hyerdensities within the subdural. collection. Pt was taken to the OR for R sided mishel hole 6/29.       EP consulted for bradycardia    REVIEW OF SYSTEMS  A ten-point review of systems was otherwise negative except as noted.      PAST MEDICAL & SURGICAL HISTORY:  Chronic GERD  No significant past surgical history      Home Medications:  Multiple Vitamins oral tablet: 1 tab(s) orally once a day (30 Jun 2020 08:51)      Allergies:  No Known Allergies      PREVIOUS DIAGNOSTIC TESTING:      FAMILY HISTORY:  No pertinent family history in first degree relatives      SOCIAL HISTORY:  CIGARETTES: few cigarettes / day  ALCOHOL: denies      MEDICATIONS  (STANDING):  acetaminophen   Tablet .. 650 milliGRAM(s) Oral every 6 hours  chlorhexidine 4% Liquid 1 Application(s) Topical <User Schedule>  heparin   Injectable 5000 Unit(s) SubCutaneous every 8 hours  levETIRAcetam  IVPB 750 milliGRAM(s) IV Intermittent every 12 hours  pantoprazole    Tablet 40 milliGRAM(s) Oral before breakfast  senna 2 Tablet(s) Oral at bedtime    MEDICATIONS  (PRN):  oxyCODONE    IR 5 milliGRAM(s) Oral every 6 hours PRN Moderate Pain (4 - 6)      Vital Signs Last 24 Hrs  T(C): 36.4 (30 Jun 2020 04:00), Max: 36.8 (29 Jun 2020 20:00)  T(F): 97.5 (30 Jun 2020 04:00), Max: 98.3 (29 Jun 2020 20:00)  HR: 40 (30 Jun 2020 15:00) (40 - 92)  BP: 110/61 (30 Jun 2020 15:00) (110/61 - 186/81)  BP(mean): 77 (30 Jun 2020 15:00) (76 - 119)  RR: 21 (30 Jun 2020 15:00) (16 - 28)  SpO2: 100% (30 Jun 2020 15:00) (97% - 100%)    PHYSICAL EXAM:    GENERAL: In no apparent distress, well nourished, and hydrated.  HEART: Regular rate and rhythm; No murmurs, rubs, or gallops.  PULMONARY: Clear to auscultation and perfusion.  No rales, wheezing, or rhonchi bilaterally.  ABDOMEN: Soft, Nontender, Nondistended; Bowel sounds present  EXTREMITIES:  2+ Peripheral Pulses, No clubbing, cyanosis, or edema  NEUROLOGICAL: AO x3, DANIEL, following commands      INTERPRETATION OF TELEMETRY: SB 42 BPM    ECG:  < from: 12 Lead ECG (06.28.20 @ 21:44) >  Ventricular Rate 91 BPM    Atrial Rate 86 BPM    P-R Interval 158 ms    QRS Duration 184 ms    Q-T Interval 432 ms    QTC Calculation(Bezet) 531 ms    P Axis 74 degrees    R Axis 256 degrees    T Axis 68 degrees    Diagnosis Line    Sinus rhythm with sinus arrhythmia  Right bundle branch block  Possible Lateral infarct , age undetermined  Abnormal ECG    < end of copied text >      I&O's Detail    29 Jun 2020 07:01  -  30 Jun 2020 07:00  --------------------------------------------------------  IN:    IV PiggyBack: 200 mL    lactated ringers.: 190 mL    lactated ringers.: 825 mL  Total IN: 1215 mL    OUT:    Voided: 940 mL  Total OUT: 940 mL    Total NET: 275 mL      30 Jun 2020 07:01  -  30 Jun 2020 15:49  --------------------------------------------------------  IN:    IV PiggyBack: 100 mL    lactated ringers.: 225 mL    Oral Fluid: 480 mL  Total IN: 805 mL    OUT:    Voided: 850 mL  Total OUT: 850 mL    Total NET: -45 mL    LABS:                        13.3   11.19 )-----------( 242      ( 30 Jun 2020 00:30 )             39.1     06-30    142  |  107  |  14  ----------------------------<  112<H>  3.9   |  23  |  0.7    Ca    8.7      30 Jun 2020 00:30  Phos  3.8     06-30  Mg     1.9     06-30    TPro  6.8  /  Alb  4.2  /  TBili  0.4  /  DBili  x   /  AST  18  /  ALT  8   /  AlkPhos  77  06-28    CARDIAC MARKERS ( 30 Jun 2020 04:10 )  x     / <0.01 ng/mL / 73 U/L / x     / 1.8 ng/mL  CARDIAC MARKERS ( 29 Jun 2020 21:19 )  x     / <0.01 ng/mL / 90 U/L / x     / 2.2 ng/mL  CARDIAC MARKERS ( 29 Jun 2020 13:02 )  x     / <0.01 ng/mL / 99 U/L / x     / 2.6 ng/mL  CARDIAC MARKERS ( 28 Jun 2020 17:40 )  x     / <0.01 ng/mL / x     / x     / x        PT/INR - ( 30 Jun 2020 00:30 )   PT: 12.20 sec;   INR: 1.06 ratio      PTT - ( 30 Jun 2020 00:30 )  PTT:29.9 sec  Urinalysis Basic - ( 28 Jun 2020 20:59 )    Color: Light Yellow / Appearance: Clear / SG: >1.050 / pH: x  Gluc: x / Ketone: Negative  / Bili: Negative / Urobili: <2 mg/dL   Blood: x / Protein: 30 mg/dL / Nitrite: Negative   Leuk Esterase: Negative / RBC: 2 /HPF / WBC 0 /HPF   Sq Epi: x / Non Sq Epi: 0 /HPF / Bacteria: 0.0    I&O's Detail    29 Jun 2020 07:01  -  30 Jun 2020 07:00  --------------------------------------------------------  IN:    IV PiggyBack: 200 mL    lactated ringers.: 190 mL    lactated ringers.: 825 mL  Total IN: 1215 mL    OUT:    Voided: 940 mL  Total OUT: 940 mL    Total NET: 275 mL    30 Jun 2020 07:01  -  30 Jun 2020 15:49  --------------------------------------------------------  IN:    IV PiggyBack: 100 mL    lactated ringers.: 225 mL    Oral Fluid: 480 mL  Total IN: 805 mL    OUT:    Voided: 850 mL  Total OUT: 850 mL    Total NET: -45 mL    Daily Height in cm: 170.18 (30 Jun 2020 13:02)    Daily     RADIOLOGY & ADDITIONAL STUDIES:  < from: CT Head No Cont (06.30.20 @ 07:39) >  FINDINGS:   There are interval operative changes related to right parietal mishel hole or evacuation of subdural hemorrhage, with overlying subcutaneous gas and skin staples. There is extra axial air as well as layering mixed density extra-axial collection, with interval hyperdense hemorrhage noted in the extra-axial spaces. There is mass effect on the adjacent right frontal lobe. There is persistent but decreased right to left midline shift of about 6.7 mm. There is mass effect and partial  effacement of the right lateral ventricle, which appears similar to the previous study.     There is redemonstration of a left high parietal slight increase in size of a high left parietal subdural hemorrhage. Subdural hemorrhage measuring up to about 7 mm in thickness which has slightly increased since the previous study when it measured about 6.0 mm in thickness.    There is no hydrocephalus. There is new hypoattenuation involving the right frontal gray-white junction. Unclear if this is artifactual or may represent interval occurring infarct. MRI brain is suggested to further evaluate.    Visualized paranasal sinuses are well aerated. Calvarium is otherwise unremarkable.    IMPRESSION:     1. Interval operative changes as detailed above with extra-axial air and mixed density hemorrhage with persistent mass effect on the right frontal lobe and leftward midline shift of approximately 6.7 mm (previously 1.3 cm).    2. Interval occurring hypoattenuation involving the right frontal gray-white junction. MRI of the brain is suggested to further evaluate.    3. Slight interval increase in size of a high left parietal subdural hemorrhage.    Findings, including recommendation for MRI, discussed with YANG SANTARCUZ on 6/30/2020 9:31 AM with readback.    < end of copied text > Patient is a 65y old  Male who presents with a chief complaint of Subdural Hematoma (30 Jun 2020 09:38)    HPI:  This is a 65 year old male Cantonese speaking with PMHx of GERD not on any medications who presented with left sided weakness associated with off balanced gait for 2-3 days. The son also reports pt has had weight loss and decreased appetite. Of note in talking with the son the patient admits to hitting his head about one week ago but reported no loss of conscious with it. On the day of presentation he was having worsening left sided weakness with ataxia and unbalanced gait.     In the ED /58, HR 62, T98.8, sat 98% on room air. Initial NIHSS reported 2 but was 0 on my examination. CT head noted for 2.5cm right subdural isodensity representing subacute subdural hematoma with 1.6cm midline shift to the left. Noted acute on subacute hemorrhage with noted focal hyerdensities within the subdural. collection. Pt was taken to the OR for R sided mishel hole 6/29.     EP consulted for bradycardia    REVIEW OF SYSTEMS  A ten-point review of systems was otherwise negative except as noted.      PAST MEDICAL & SURGICAL HISTORY:  Chronic GERD  No significant past surgical history      Home Medications:  Multiple Vitamins oral tablet: 1 tab(s) orally once a day (30 Jun 2020 08:51)      Allergies:  No Known Allergies      PREVIOUS DIAGNOSTIC TESTING:      FAMILY HISTORY:  No pertinent family history in first degree relatives      SOCIAL HISTORY:  CIGARETTES: few cigarettes / day  ALCOHOL: denies      MEDICATIONS  (STANDING):  acetaminophen   Tablet .. 650 milliGRAM(s) Oral every 6 hours  chlorhexidine 4% Liquid 1 Application(s) Topical <User Schedule>  heparin   Injectable 5000 Unit(s) SubCutaneous every 8 hours  levETIRAcetam  IVPB 750 milliGRAM(s) IV Intermittent every 12 hours  pantoprazole    Tablet 40 milliGRAM(s) Oral before breakfast  senna 2 Tablet(s) Oral at bedtime    MEDICATIONS  (PRN):  oxyCODONE    IR 5 milliGRAM(s) Oral every 6 hours PRN Moderate Pain (4 - 6)      Vital Signs Last 24 Hrs  T(C): 36.4 (30 Jun 2020 04:00), Max: 36.8 (29 Jun 2020 20:00)  T(F): 97.5 (30 Jun 2020 04:00), Max: 98.3 (29 Jun 2020 20:00)  HR: 40 (30 Jun 2020 15:00) (40 - 92)  BP: 110/61 (30 Jun 2020 15:00) (110/61 - 186/81)  BP(mean): 77 (30 Jun 2020 15:00) (76 - 119)  RR: 21 (30 Jun 2020 15:00) (16 - 28)  SpO2: 100% (30 Jun 2020 15:00) (97% - 100%)    PHYSICAL EXAM:    GENERAL: In no apparent distress, well nourished, and hydrated. dressing in place on scalp  HEART: bradycardic  PULMONARY: Clear to auscultation and perfusion.  No rales, wheezing, or rhonchi bilaterally.  ABDOMEN: Soft, Nontender, Nondistended; Bowel sounds present  EXTREMITIES:  2+ Peripheral Pulses, No clubbing, cyanosis, or edema  NEUROLOGICAL: AO x3, DANIEL, following commands      INTERPRETATION OF TELEMETRY: SB 42 BPM    ECG:  < from: 12 Lead ECG (06.28.20 @ 21:44) >  Ventricular Rate 91 BPM    Atrial Rate 86 BPM    P-R Interval 158 ms    QRS Duration 184 ms    Q-T Interval 432 ms    QTC Calculation(Bezet) 531 ms    P Axis 74 degrees    R Axis 256 degrees    T Axis 68 degrees    Diagnosis Line    Sinus rhythm with sinus arrhythmia  Right bundle branch block  Possible Lateral infarct , age undetermined  Abnormal ECG    < end of copied text >      I&O's Detail    29 Jun 2020 07:01  -  30 Jun 2020 07:00  --------------------------------------------------------  IN:    IV PiggyBack: 200 mL    lactated ringers.: 190 mL    lactated ringers.: 825 mL  Total IN: 1215 mL    OUT:    Voided: 940 mL  Total OUT: 940 mL    Total NET: 275 mL      30 Jun 2020 07:01  -  30 Jun 2020 15:49  --------------------------------------------------------  IN:    IV PiggyBack: 100 mL    lactated ringers.: 225 mL    Oral Fluid: 480 mL  Total IN: 805 mL    OUT:    Voided: 850 mL  Total OUT: 850 mL    Total NET: -45 mL    LABS:                        13.3   11.19 )-----------( 242      ( 30 Jun 2020 00:30 )             39.1     06-30    142  |  107  |  14  ----------------------------<  112<H>  3.9   |  23  |  0.7    Ca    8.7      30 Jun 2020 00:30  Phos  3.8     06-30  Mg     1.9     06-30    TPro  6.8  /  Alb  4.2  /  TBili  0.4  /  DBili  x   /  AST  18  /  ALT  8   /  AlkPhos  77  06-28    CARDIAC MARKERS ( 30 Jun 2020 04:10 )  x     / <0.01 ng/mL / 73 U/L / x     / 1.8 ng/mL  CARDIAC MARKERS ( 29 Jun 2020 21:19 )  x     / <0.01 ng/mL / 90 U/L / x     / 2.2 ng/mL  CARDIAC MARKERS ( 29 Jun 2020 13:02 )  x     / <0.01 ng/mL / 99 U/L / x     / 2.6 ng/mL  CARDIAC MARKERS ( 28 Jun 2020 17:40 )  x     / <0.01 ng/mL / x     / x     / x        PT/INR - ( 30 Jun 2020 00:30 )   PT: 12.20 sec;   INR: 1.06 ratio      PTT - ( 30 Jun 2020 00:30 )  PTT:29.9 sec  Urinalysis Basic - ( 28 Jun 2020 20:59 )    Color: Light Yellow / Appearance: Clear / SG: >1.050 / pH: x  Gluc: x / Ketone: Negative  / Bili: Negative / Urobili: <2 mg/dL   Blood: x / Protein: 30 mg/dL / Nitrite: Negative   Leuk Esterase: Negative / RBC: 2 /HPF / WBC 0 /HPF   Sq Epi: x / Non Sq Epi: 0 /HPF / Bacteria: 0.0    I&O's Detail    29 Jun 2020 07:01  -  30 Jun 2020 07:00  --------------------------------------------------------  IN:    IV PiggyBack: 200 mL    lactated ringers.: 190 mL    lactated ringers.: 825 mL  Total IN: 1215 mL    OUT:    Voided: 940 mL  Total OUT: 940 mL    Total NET: 275 mL    30 Jun 2020 07:01  -  30 Jun 2020 15:49  --------------------------------------------------------  IN:    IV PiggyBack: 100 mL    lactated ringers.: 225 mL    Oral Fluid: 480 mL  Total IN: 805 mL    OUT:    Voided: 850 mL  Total OUT: 850 mL    Total NET: -45 mL    Daily Height in cm: 170.18 (30 Jun 2020 13:02)    Daily     RADIOLOGY & ADDITIONAL STUDIES:  < from: CT Head No Cont (06.30.20 @ 07:39) >  FINDINGS:   There are interval operative changes related to right parietal mishel hole or evacuation of subdural hemorrhage, with overlying subcutaneous gas and skin staples. There is extra axial air as well as layering mixed density extra-axial collection, with interval hyperdense hemorrhage noted in the extra-axial spaces. There is mass effect on the adjacent right frontal lobe. There is persistent but decreased right to left midline shift of about 6.7 mm. There is mass effect and partial  effacement of the right lateral ventricle, which appears similar to the previous study.     There is redemonstration of a left high parietal slight increase in size of a high left parietal subdural hemorrhage. Subdural hemorrhage measuring up to about 7 mm in thickness which has slightly increased since the previous study when it measured about 6.0 mm in thickness.    There is no hydrocephalus. There is new hypoattenuation involving the right frontal gray-white junction. Unclear if this is artifactual or may represent interval occurring infarct. MRI brain is suggested to further evaluate.    Visualized paranasal sinuses are well aerated. Calvarium is otherwise unremarkable.    IMPRESSION:   1. Interval operative changes as detailed above with extra-axial air and mixed density hemorrhage with persistent mass effect on the right frontal lobe and leftward midline shift of approximately 6.7 mm (previously 1.3 cm).    2. Interval occurring hypoattenuation involving the right frontal gray-white junction. MRI of the brain is suggested to further evaluate.    3. Slight interval increase in size of a high left parietal subdural hemorrhage.    Findings, including recommendation for MRI, discussed with YANG SANTACRUZ on 6/30/2020 9:31 AM with readback.  < end of copied text >

## 2020-06-30 NOTE — DIETITIAN INITIAL EVALUATION ADULT. - CONTINUE CURRENT NUTRITION CARE PLAN
pt to consume and tolerate >75% of all meals and snacks upon f/u in 3 days. meals and snacks. RD to monitor diet order, energy intake, NFPF (PO tolerance)

## 2020-06-30 NOTE — CHART NOTE - NSCHARTNOTEFT_GEN_A_CORE
66 yo Cantonese-speaking M w/ PMH of GERD not on medications who presented on 6/28 with 2-3 days of left sided weakness and ataxia. Pt reportedly hit his head 1 week prior to admission; denies LOC. Family brought pt in to the ED because he was having worsening L sided weakness. In the ED, pt was hemodynamically stable: /58, HR 62, T98.8, sat 98% on room air. Initial NIHSS ranged from 0-2 on exam. CT head notable for 2.5cm right subdural isodensity representing subacute subdural hematoma with 1.6cm midline shift to the left. Noted acute on subacute hemorrhage with noted focal hyperdensities within the subdural collection. He was admitted to the MICU for Q1H neuro checks. Pt was taken to the OR for R sided lisa hole 6/29. SICU is consulted for Q1H neuro checks s/p R sided lisa hole. Pt transferred from medicine to neurosurgery service.     ndication for ICU admission: acute on subacute R SDH w/ MLS s/p R lisa hole   Admit Date:06-28-20  ICU Date: 6/29  OR Date: 6/29    No Known Allergies    PAST MEDICAL & SURGICAL HISTORY:  Chronic GERD  No significant past surgical history    Home Medications:  Multiple Vitamins oral tablet: 1 tab(s) orally once a day (28 Jun 2020 20:40)      NEURO:  - POD #1 s/p rt LISA hole for acute on chronic SDH  - Q4H neuro checks   - Keppra 750mg BID  - < from: CT Head No Cont (06.30.20 @ 07:39) >  1. Interval operative changes as detailed above with extra-axial air and mixed density hemorrhage with persistent mass effect on the right frontal lobe and leftward midline shift of approximately 6.7 mm (previously 1.3 cm).  2. Interval occurring hypoattenuation involving the right frontal gray-white junction. MRI of the brain is suggested to further evaluate.  3. Slight interval increase in size of a high left parietal subdural hemorrhage.  - no dextrose containing products  - HOB at 30 degrees-Repeat HCT in am 7/01    RESP:   - 100% NRB for pneumocephalus  - PMH of smoking    CARDS:   - hemodynamically stable, loren, f/u cardio   - BP goal: -150  - troponin 0.01 x 3  -Bradycardia 40-50, EPS consult pending    GI/NUTR:   - Underweight, malnourished  BMI <19  - PMH GERD not on medications  -Regular diet with GERD precautions  - GI PPX: PTX  - bowel regimen: senna    /RENAL:   - F/U BMP at 2330  - strict I/Os  - no acute issues, 14/0.7    HEME/ONC:   - DVT PPX: SCDs, HSQ started 6/30  - Hemoglobin 13.3/39.1    ID:  - Completed post op ancef 2g  - afebrile  - WBC 11.19- reactive leukocytosis    ENDO:  - no chronic diagnoses  - FS before meals and at bedtime  - Glucose, Serum: 105 (06-29 @ 04:58)    LINES/DRAINS: PIV    ACTIVITY: bedrest until 6/30 AM  DISPO: downgrade    DVT Prophylaxis: SCDs    GI Prophylaxis:pantoprazole    Tablet 40 milliGRAM(s) Oral before breakfast    ***Tubes/Lines/Drains  ***  Peripheral IV 64 yo Cantonese-speaking M w/ PMH of GERD not on medications who presented on 6/28 with 2-3 days of left sided weakness and ataxia. Pt reportedly hit his head 1 week prior to admission; denies LOC. Family brought pt in to the ED because he was having worsening L sided weakness. In the ED, pt was hemodynamically stable: /58, HR 62, T98.8, sat 98% on room air. Initial NIHSS ranged from 0-2 on exam. CT head notable for 2.5cm right subdural isodensity representing subacute subdural hematoma with 1.6cm midline shift to the left. Noted acute on subacute hemorrhage with noted focal hyperdensities within the subdural collection. He was admitted to the MICU for Q1H neuro checks. Pt was taken to the OR for R sided lisa hole 6/29. SICU is consulted for Q1H neuro checks s/p R sided lisa hole. Pt transferred from medicine to neurosurgery service.     ndication for ICU admission: acute on subacute R SDH w/ MLS s/p R lisa hole   Admit Date:06-28-20  ICU Date: 6/29  OR Date: 6/29    No Known Allergies    PAST MEDICAL & SURGICAL HISTORY:  Chronic GERD  No significant past surgical history    Home Medications:  Multiple Vitamins oral tablet: 1 tab(s) orally once a day (28 Jun 2020 20:40)      NEURO:  - POD #1 s/p rt LISA hole for acute on chronic SDH  - Q4H neuro checks   - Keppra 750mg BID  - < from: CT Head No Cont (06.30.20 @ 07:39) >  1. Interval operative changes as detailed above with extra-axial air and mixed density hemorrhage with persistent mass effect on the right frontal lobe and leftward midline shift of approximately 6.7 mm (previously 1.3 cm).  2. Interval occurring hypoattenuation involving the right frontal gray-white junction. MRI of the brain is suggested to further evaluate.  3. Slight interval increase in size of a high left parietal subdural hemorrhage.  - no dextrose containing products  - HOB at 30 degrees-Repeat HCT in am 7/01    RESP:   - 100% NRB for pneumocephalus  - PMH of smoking    CARDS:   - hemodynamically stable, loren, f/u cardio   - BP goal: -150  - troponin 0.01 x 3  -Bradycardia 40-50, EPS consult pending    GI/NUTR:   - Underweight, malnourished  BMI <19  - PMH GERD not on medications  -Regular diet with GERD precautions  - GI PPX: PTX  - bowel regimen: senna    /RENAL:   - F/U BMP at 2330  - strict I/Os  - no acute issues, 14/0.7    HEME/ONC:   - DVT PPX: SCDs, HSQ started 6/30  - Hemoglobin 13.3/39.1    ID:  - Completed post op ancef 2g  - afebrile  - WBC 11.19- reactive leukocytosis    ENDO:  - no chronic diagnoses    LINES/DRAINS: PIV    ACTIVITY: As tolerated  DISPO: downgrade    DVT Prophylaxis: SCDs    GI Prophylaxis: pantoprazole    Tablet 40 milliGRAM(s) Oral before breakfast    ***Tubes/Lines/Drains  ***  Peripheral IV    SIgned out Jing DE LOS SANTOS at 410pm, 6/30/2020

## 2020-06-30 NOTE — DIETITIAN INITIAL EVALUATION ADULT. - DIET TYPE
PTA pt eats well, prefers chinese foods, 3 meals with a snack generally, and eats enough he thinks. Takes Vitamin C and E by himself. NKFA. Pt admits to being around 130# when he was 8-10 years ago, and because he never weighed himself recently, he does not know what weight he is, but is feeling that he has gotten thinner over the years. Pt does appear thin, but is eating well.

## 2020-07-01 LAB
ANION GAP SERPL CALC-SCNC: 10 MMOL/L — SIGNIFICANT CHANGE UP (ref 7–14)
BUN SERPL-MCNC: 18 MG/DL — SIGNIFICANT CHANGE UP (ref 10–20)
CALCIUM SERPL-MCNC: 8.5 MG/DL — SIGNIFICANT CHANGE UP (ref 8.5–10.1)
CHLORIDE SERPL-SCNC: 106 MMOL/L — SIGNIFICANT CHANGE UP (ref 98–110)
CO2 SERPL-SCNC: 25 MMOL/L — SIGNIFICANT CHANGE UP (ref 17–32)
CREAT SERPL-MCNC: 0.8 MG/DL — SIGNIFICANT CHANGE UP (ref 0.7–1.5)
GLUCOSE SERPL-MCNC: 101 MG/DL — HIGH (ref 70–99)
HCT VFR BLD CALC: 38.1 % — LOW (ref 42–52)
HGB BLD-MCNC: 12.7 G/DL — LOW (ref 14–18)
MAGNESIUM SERPL-MCNC: 1.9 MG/DL — SIGNIFICANT CHANGE UP (ref 1.8–2.4)
MCHC RBC-ENTMCNC: 31.5 PG — HIGH (ref 27–31)
MCHC RBC-ENTMCNC: 33.3 G/DL — SIGNIFICANT CHANGE UP (ref 32–37)
MCV RBC AUTO: 94.5 FL — HIGH (ref 80–94)
NRBC # BLD: 0 /100 WBCS — SIGNIFICANT CHANGE UP (ref 0–0)
PHOSPHATE SERPL-MCNC: 3.7 MG/DL — SIGNIFICANT CHANGE UP (ref 2.1–4.9)
PLATELET # BLD AUTO: 224 K/UL — SIGNIFICANT CHANGE UP (ref 130–400)
POTASSIUM SERPL-MCNC: 4.1 MMOL/L — SIGNIFICANT CHANGE UP (ref 3.5–5)
POTASSIUM SERPL-SCNC: 4.1 MMOL/L — SIGNIFICANT CHANGE UP (ref 3.5–5)
RBC # BLD: 4.03 M/UL — LOW (ref 4.7–6.1)
RBC # FLD: 13.9 % — SIGNIFICANT CHANGE UP (ref 11.5–14.5)
SODIUM SERPL-SCNC: 141 MMOL/L — SIGNIFICANT CHANGE UP (ref 135–146)
T3FREE SERPL-MCNC: 1.84 PG/ML — SIGNIFICANT CHANGE UP (ref 1.8–4.6)
T4 AB SER-ACNC: 4.3 UG/DL — LOW (ref 4.6–12)
T4 FREE SERPL-MCNC: 1.4 NG/DL — SIGNIFICANT CHANGE UP (ref 0.9–1.8)
TSH SERPL-MCNC: 0.56 UIU/ML — SIGNIFICANT CHANGE UP (ref 0.27–4.2)
TSH SERPL-MCNC: 0.9 UIU/ML — SIGNIFICANT CHANGE UP (ref 0.27–4.2)
WBC # BLD: 8.3 K/UL — SIGNIFICANT CHANGE UP (ref 4.8–10.8)
WBC # FLD AUTO: 8.3 K/UL — SIGNIFICANT CHANGE UP (ref 4.8–10.8)

## 2020-07-01 PROCEDURE — 70450 CT HEAD/BRAIN W/O DYE: CPT | Mod: 26

## 2020-07-01 RX ADMIN — HEPARIN SODIUM 5000 UNIT(S): 5000 INJECTION INTRAVENOUS; SUBCUTANEOUS at 22:21

## 2020-07-01 RX ADMIN — Medication 650 MILLIGRAM(S): at 05:17

## 2020-07-01 RX ADMIN — PANTOPRAZOLE SODIUM 40 MILLIGRAM(S): 20 TABLET, DELAYED RELEASE ORAL at 05:18

## 2020-07-01 RX ADMIN — Medication 650 MILLIGRAM(S): at 23:08

## 2020-07-01 RX ADMIN — HEPARIN SODIUM 5000 UNIT(S): 5000 INJECTION INTRAVENOUS; SUBCUTANEOUS at 16:19

## 2020-07-01 RX ADMIN — LEVETIRACETAM 400 MILLIGRAM(S): 250 TABLET, FILM COATED ORAL at 05:16

## 2020-07-01 RX ADMIN — SENNA PLUS 2 TABLET(S): 8.6 TABLET ORAL at 22:21

## 2020-07-01 RX ADMIN — LEVETIRACETAM 400 MILLIGRAM(S): 250 TABLET, FILM COATED ORAL at 17:38

## 2020-07-01 RX ADMIN — HEPARIN SODIUM 5000 UNIT(S): 5000 INJECTION INTRAVENOUS; SUBCUTANEOUS at 05:17

## 2020-07-01 NOTE — PROGRESS NOTE ADULT - ASSESSMENT
Pt doing well POD#2 s/p Mark hole    - cont q4h neurochecks  - cardiology consult for bradycardia, mitral mass vs vegetation, troponin (-) x3  - pending OT  - OOB, PT/rehab  - cont Keppra  - tolerating diet  - GI ppx - PTX  - senna    Case d/w and pt seen with Dr Chavez

## 2020-07-01 NOTE — PROGRESS NOTE ADULT - SUBJECTIVE AND OBJECTIVE BOX
POD#2 s/p Right mishel hole for evacuation of subacute SDH    Pt seen and examined at bedside.  Pt found alert and awake.  Follows commands readily. Denies headache dizziness.    Repeat CT head done showing residual pneumocephalus, unchanged b/l subdural collections.      Vital Signs Last 24 Hrs  T(C): 36.9 (01 Jul 2020 13:06), Max: 36.9 (01 Jul 2020 13:06)  T(F): 98.4 (01 Jul 2020 13:06), Max: 98.4 (01 Jul 2020 13:06)  HR: 48 (01 Jul 2020 13:06) (40 - 52)  BP: 133/67 (01 Jul 2020 13:06) (120/59 - 150/65)  BP(mean): 79 (30 Jun 2020 16:00) (79 - 79)  RR: 18 (01 Jul 2020 13:59) (18 - 22)  SpO2: 97% (01 Jul 2020 13:59) (97% - 100%)    PHYSICAL EXAM:    Exam:  Awake and alert. Verbal function intact  tongue midline  no droop  pupils reactive  tracking  Pronator Drift: none  Motor: MAEx4, strength 5/5 throughout all extremities  Sensation: intact to touch         MEDICATIONS:  Antibiotics:    Neuro:  acetaminophen   Tablet .. 650 milliGRAM(s) Oral every 6 hours  levETIRAcetam  IVPB 750 milliGRAM(s) IV Intermittent every 12 hours  oxyCODONE    IR 5 milliGRAM(s) Oral every 6 hours PRN    Anticoagulation:  heparin   Injectable 5000 Unit(s) SubCutaneous every 8 hours    OTHER:  chlorhexidine 4% Liquid 1 Application(s) Topical <User Schedule>  pantoprazole    Tablet 40 milliGRAM(s) Oral before breakfast  senna 2 Tablet(s) Oral at bedtime    IVF:        LABS:                        12.7   8.30  )-----------( 224      ( 01 Jul 2020 00:40 )             38.1     07-01    141  |  106  |  18  ----------------------------<  101<H>  4.1   |  25  |  0.8    Ca    8.5      01 Jul 2020 00:40  Phos  3.7     07-01  Mg     1.9     07-01      PT/INR - ( 30 Jun 2020 00:30 )   PT: 12.20 sec;   INR: 1.06 ratio         PTT - ( 30 Jun 2020 00:30 )  PTT:29.9 sec      RADIOLOGY:  < from: CT Head No Cont (07.01.20 @ 08:21) >  IMPRESSION:  Since the CT head from the prior day:    1.  Patient is again noted to be status post right parietal mishel hole for subdural hematoma evacuation. Unchanged moderate pneumocephalus.    2.  No significant interval change in the right cerebral convexity residual subdural collection measuring up to 1.3 cm in width, and the left parietal subdural hematoma measuring up to 7 mm in width.    3.  No significant interval change in the right to left midline shift measuring 6 mm.    4.  Previously described loss of gray-white differentiation in the right frontal lobe is not visualized on the current exam. Recommend attention on follow-up imaging.    < end of copied text >

## 2020-07-01 NOTE — PHYSICAL THERAPY INITIAL EVALUATION ADULT - GENERAL OBSERVATIONS, REHAB EVAL
Pt. encountered alert and NAD, semifowlers. (+)O2 NC 3L/min, (+)tele, (+)IV lock, (+)R sided dressing over head with intact staples, agreeable to PT Eval.O2 disconnected, SPO2 99%

## 2020-07-01 NOTE — PHYSICAL THERAPY INITIAL EVALUATION ADULT - PERTINENT HX OF CURRENT PROBLEM, REHAB EVAL
64 yo Cantonese-speaking M w/ PMH of GERD not on medications who presented on 6/28 with 2-3 days of left sided weakness and ataxia. Pt reportedly hit his head 1 week prior to admission. CT head notable for 2.5cm right subdural isodensity representing subacute subdural hematoma with 1.6cm midline shift to the left. Pt was taken to the OR for R sided mishel hole 6/29.

## 2020-07-01 NOTE — CHART NOTE - NSCHARTNOTEFT_GEN_A_CORE
Reviewed 2D echo, spoke with EP regarding mitral mass vs vegetation vs torn chord. EP recommended in patient evaluation and cardiology consult. Cardio consult ordered, pending recs.

## 2020-07-01 NOTE — PHYSICAL THERAPY INITIAL EVALUATION ADULT - ADDITIONAL COMMENTS
Pt. reports he lives with his family in a private house with ~8 SIA. Pt. reports he was fully independent PTA.

## 2020-07-02 PROCEDURE — 99222 1ST HOSP IP/OBS MODERATE 55: CPT

## 2020-07-02 RX ADMIN — HEPARIN SODIUM 5000 UNIT(S): 5000 INJECTION INTRAVENOUS; SUBCUTANEOUS at 05:18

## 2020-07-02 RX ADMIN — Medication 650 MILLIGRAM(S): at 05:17

## 2020-07-02 RX ADMIN — CHLORHEXIDINE GLUCONATE 1 APPLICATION(S): 213 SOLUTION TOPICAL at 05:18

## 2020-07-02 RX ADMIN — HEPARIN SODIUM 5000 UNIT(S): 5000 INJECTION INTRAVENOUS; SUBCUTANEOUS at 12:47

## 2020-07-02 RX ADMIN — SENNA PLUS 2 TABLET(S): 8.6 TABLET ORAL at 21:12

## 2020-07-02 RX ADMIN — Medication 650 MILLIGRAM(S): at 16:51

## 2020-07-02 RX ADMIN — HEPARIN SODIUM 5000 UNIT(S): 5000 INJECTION INTRAVENOUS; SUBCUTANEOUS at 21:12

## 2020-07-02 RX ADMIN — LEVETIRACETAM 400 MILLIGRAM(S): 250 TABLET, FILM COATED ORAL at 16:51

## 2020-07-02 RX ADMIN — PANTOPRAZOLE SODIUM 40 MILLIGRAM(S): 20 TABLET, DELAYED RELEASE ORAL at 05:18

## 2020-07-02 RX ADMIN — LEVETIRACETAM 400 MILLIGRAM(S): 250 TABLET, FILM COATED ORAL at 05:18

## 2020-07-02 RX ADMIN — Medication 650 MILLIGRAM(S): at 10:28

## 2020-07-02 NOTE — CONSULT NOTE ADULT - ASSESSMENT
65 yo male patient with PMHx of GERD, presenting for fall, found to have subdural hematoma s/p evacuation with neurosurgery. Found to be bradycardic, EP consulted, during workup TTE showed possible vegetation, mass, fibroelastoma or torn chordae on the posterior leaflet of the mitral valve. 65 yo male patient with PMHx of GERD, presenting for fall, found to have subdural hematoma s/p evacuation with neurosurgery. Found to be bradycardic, EP consulted, during workup TTE showed possible vegetation, mass, fibroelastoma or torn chordae on the posterior leaflet of the mitral valve.    Echo reviewed, findings are suspicious for redundant chordae; low probability of endocarditis  -Check 2 sets of blood cultures, if negative, no further cardiac work-up needed then 65 yo male patient with PMHx of GERD, presenting for fall, found to have subdural hematoma s/p evacuation with neurosurgery. Found to be bradycardic, EP consulted, during workup TTE showed possible vegetation, mass, fibroelastoma or torn chordae on the posterior leaflet of the mitral valve.    Echo reviewed, findings are suspicious for redundant chordae; low probability of endocarditis  No further card w/up as clinically no def of BE as well  recall prn

## 2020-07-02 NOTE — OCCUPATIONAL THERAPY INITIAL EVALUATION ADULT - GENERAL OBSERVATIONS, REHAB EVAL
# [id number] used throughout OT evaluation. Pt encountered seated in bed side chair +IV lock, +Chair alarm, in NAD. Pt left seated in bed side chair +chair alarm, +IV lock, in NAD. RN aware.

## 2020-07-02 NOTE — OCCUPATIONAL THERAPY INITIAL EVALUATION ADULT - SHORT TERM MEMORY, REHAB EVAL
Hemithyroidectomy, right  11/08/2018  Right superior parathyroidectomy , R inferior parathyroidectomy with auto-implantation of parathyroid tissue into sternocleinomastoid  Active  [id number] intact <<-----Click on this checkbox to enter Procedure

## 2020-07-02 NOTE — PROGRESS NOTE ADULT - SUBJECTIVE AND OBJECTIVE BOX
POD # 3    S/P Right [place] hole evacuation of SDH     pt seen and examined at bedside pt is alert no complaints follows commands         Vital Signs Last 24 Hrs  T(C): 36.8 (02 Jul 2020 12:27), Max: 37.1 (01 Jul 2020 20:17)  T(F): 98.2 (02 Jul 2020 12:27), Max: 98.7 (01 Jul 2020 20:17)  HR: 53 (02 Jul 2020 12:27) (43 - 53)  BP: 137/75 (02 Jul 2020 12:27) (133/67 - 166/74)  BP(mean): --  RR: 18 (02 Jul 2020 12:27) (18 - 18)  SpO2: 99% (01 Jul 2020 19:46) (97% - 100%)    PHYSICAL EXAM:    A&OX3, PERRL   EOM ( I  )   MAEX4   MS bilateral UE's 5/5         bilateral LE's 5/5   no pronator drift   head incision clean dry intact          MEDICATIONS:  Antibiotics:    Neuro:  acetaminophen   Tablet .. 650 milliGRAM(s) Oral every 6 hours  levETIRAcetam  IVPB 750 milliGRAM(s) IV Intermittent every 12 hours  oxyCODONE    IR 5 milliGRAM(s) Oral every 6 hours PRN    Anticoagulation:  heparin   Injectable 5000 Unit(s) SubCutaneous every 8 hours    OTHER:  chlorhexidine 4% Liquid 1 Application(s) Topical <User Schedule>  pantoprazole    Tablet 40 milliGRAM(s) Oral before breakfast  senna 2 Tablet(s) Oral at bedtime    IVF:        LABS:                        12.7   8.30  )-----------( 224      ( 01 Jul 2020 00:40 )             38.1     07-01    141  |  106  |  18  ----------------------------<  101<H>  4.1   |  25  |  0.8    Ca    8.5      01 Jul 2020 00:40  Phos  3.7     07-01  Mg     1.9     07-01            A/P      S/p  Right mishel hole evacuation of SDH                      f/u cardiology                     PT/ Rehab

## 2020-07-02 NOTE — CONSULT NOTE ADULT - SUBJECTIVE AND OBJECTIVE BOX
HPI:  This is a 65 year old male Cantonese speaking with PMHx of GERD not on any medications who presented with left sided weakness associated with off balanced gait for 2-3 days. The son also reports pt has had weight loss and decreased appetite. Of note in talking with the son the patient admits to hitting his head about one week ago but reported no loss of conscious with it. On the day of presentation he was having worsening left sided weakness with ataxia and unbalanced gait.     In the ED /58, HR 62, T98.8, sat 98% on room air. Initial NIHSS reported 2 but was 0 on my examination. CT head noted for 2.5cm right subdural isodensity representing subacute subdural hematoma with 1.6cm midline shift to the left. Noted acute on subacute hemorrhage with noted focal hyerdensities within the subdural collection. s/p mishel hole evacuation of the hematoma.  Patient was bradycardic in the 40s, EP consulted, asked for TTE and TSH.  TTE showed possible mass vegetation or chordae on the posterior leaflet of mitral valve. cardiology consulted for evaluation  No fever, no chills, no weight loss, no hx of endocarditis...    PAST MEDICAL & SURGICAL HISTORY  Chronic GERD  No significant past surgical history      FAMILY HISTORY:  FAMILY HISTORY:  No pertinent family history in first degree relatives      SOCIAL HISTORY:  []smoker  []Alcohol  []Drug    ALLERGIES:  No Known Allergies      MEDICATIONS:  MEDICATIONS  (STANDING):  acetaminophen   Tablet .. 650 milliGRAM(s) Oral every 6 hours  chlorhexidine 4% Liquid 1 Application(s) Topical <User Schedule>  heparin   Injectable 5000 Unit(s) SubCutaneous every 8 hours  levETIRAcetam  IVPB 750 milliGRAM(s) IV Intermittent every 12 hours  pantoprazole    Tablet 40 milliGRAM(s) Oral before breakfast  senna 2 Tablet(s) Oral at bedtime    MEDICATIONS  (PRN):  oxyCODONE    IR 5 milliGRAM(s) Oral every 6 hours PRN Moderate Pain (4 - 6)      HOME MEDICATIONS:  Home Medications:  Multiple Vitamins oral tablet: 1 tab(s) orally once a day (30 Jun 2020 08:51)      VITALS:   T(F): 97.8 (07-02 @ 05:05), Max: 98.7 (06-29 @ 07:56)  HR: 43 (07-02 @ 05:05) (39 - 92)  BP: 166/74 (07-02 @ 05:05) (101/59 - 186/81)  BP(mean): 79 (06-30 @ 16:00) (71 - 119)  RR: 18 (07-02 @ 05:05) (11 - 28)  SpO2: 99% (07-01 @ 19:46) (97% - 100%)    I&O's Summary    01 Jul 2020 07:01  -  02 Jul 2020 07:00  --------------------------------------------------------  IN: 450 mL / OUT: 200 mL / NET: 250 mL    02 Jul 2020 07:01  -  02 Jul 2020 09:26  --------------------------------------------------------  IN: 275 mL / OUT: 0 mL / NET: 275 mL        REVIEW OF SYSTEMS:  CONSTITUTIONAL: No weakness, fevers or chills  EYES: No visual changes  ENT: No vertigo or throat pain   NECK: No pain or stiffness  RESPIRATORY: No cough, wheezing, hemoptysis; No shortness of breath  CARDIOVASCULAR: No chest pain or palpitations  GASTROINTESTINAL: No abdominal or epigastric pain. No nausea, vomiting, or hematemesis; No diarrhea or constipation. No melena or hematochezia.  GENITOURINARY: No dysuria, frequency or hematuria  NEUROLOGICAL: No numbness or weakness  SKIN: No itching, no rashes  MSK: No pain    PHYSICAL EXAM:  NEURO: patient is awake , alert and oriented  GEN: Not in acute distress  NECK: no thyroid enlargement, no JVD  LUNGS: Clear to auscultation bilaterally   CARDIOVASCULAR: S1/S2 present, RRR, bradycardic to 50 , no murmurs or rubs, no carotid bruits,  + PP bilaterally  ABD: Soft, non-tender, non-distended, +BS  EXT: No JESSE  SKIN: Intact    LABS:                        12.7   8.30  )-----------( 224      ( 01 Jul 2020 00:40 )             38.1     07-01    141  |  106  |  18  ----------------------------<  101<H>  4.1   |  25  |  0.8    Ca    8.5      01 Jul 2020 00:40  Phos  3.7     07-01  Mg     1.9     07-01    Troponin trend:    RADIOLOGY:    -TTE:  Summary:   1. LV Ejection Fraction by Miller's Method with a biplane EF of 67 %.   2. Normal left ventricular size and wall thicknesses, with normal systolic and diastolic function.   3. The mean global longitudinal peak strain by speckle tracking is -17.5% which is reduced.   4. Myxomatous mitral valve.   5. Trace mitral valve regurgitation.   6. Mobile filamentous mass on ventricular side near base of posterior leaflet c/w vegitation vrs fibroelastoma vrs. torn tertiary chord.   7. LA volume Index is 14.8 ml/m² ml/m2.      ECG:  sinus loren to 48

## 2020-07-02 NOTE — CHART NOTE - NSCHARTNOTEFT_GEN_A_CORE
Registered Dietitian Follow-Up     Patient Profile Reviewed                           Yes [x]   No []     Nutrition History Previously Obtained        Yes [x]  No []        Pertinent Subjective Information: Pt. is primarily Cantonese speaking, however able to answer basic questions in English. Confirms eating with good appetite at this time, tolerating regular diet. Pt. with hx of GERD. Pending GERD diet modifier in EMR. Will communicate to resident to activate order.      Pertinent Medical Interventions: - POD #2 s/p rt LISA hole for acute on chronic SDH. Q4H neuro checks. Hemodynamically stable, loren, f/u cardio, transferred to telemetry.         Diet order: regular     Anthropometrics:  - Ht. 170.2cm  - Wt. 60.7kg on 7/2 vs. 60.5kg on 6/30 vs. 54kg noted on RD assessment 6/29, will continue to monitor wt trends   - %wt change  - BMI 20.9  - IBW 148lbs      Pertinent Lab Data: ((7/2) RBC 4.30, Hg 12.7, hct 38.1, glu 101     Pertinent Meds: Protonix, Senna      Physical Findings:  - Appearance: AAOx4, no edema noted   - GI function: no symptoms noted, regular BM's  - Tubes: none noted  - Oral/Mouth cavity: no symptoms noted  - Skin: wound (BS 17)      Nutrition Requirements (from RD note on 6/29)   Weight Used: 54kg     Estimated Energy Needs    Continue []  Adjust [] 1540- 1667 kcal/day (MSJ x 1.2-1.3)  Adjusted Energy Recommendations:   kcal/day        Estimated Protein Needs    Continue [x]  Adjust [] 59-70g/day (1.1-1.3 g/kg of ABW)   Adjusted Protein Recommendations:   gm/day        Estimated Fluid Needs        Continue [x]  Adjust [] per ICU team  Adjusted Fluid Recommendations:   mL/day     Nutrient Intake: % PO at meals         [] Previous Nutrition Diagnosis:  inadequate protein-energy intake            [] Ongoing          [x] Resolved       Nutrition Intervention: meals and snacks    Rec: Continue regular diet order as tolerated, add GERD modifier      Goal/Expected Outcome: In 7 days pt. to continue to consume % PO at meals      Indicator/Monitoring: diet order, energy intake, body composition, NFPF

## 2020-07-02 NOTE — OCCUPATIONAL THERAPY INITIAL EVALUATION ADULT - REHAB POTENTIAL, OT EVAL
none/Pt currently at base line and does not require skilled occupational therapy. Please re-consult if status changes.

## 2020-07-03 ENCOUNTER — TRANSCRIPTION ENCOUNTER (OUTPATIENT)
Age: 65
End: 2020-07-03

## 2020-07-03 VITALS
TEMPERATURE: 99 F | HEART RATE: 57 BPM | RESPIRATION RATE: 18 BRPM | SYSTOLIC BLOOD PRESSURE: 112 MMHG | DIASTOLIC BLOOD PRESSURE: 54 MMHG

## 2020-07-03 RX ORDER — SENNA PLUS 8.6 MG/1
2 TABLET ORAL
Qty: 0 | Refills: 0 | DISCHARGE
Start: 2020-07-03

## 2020-07-03 RX ORDER — LEVETIRACETAM 250 MG/1
1 TABLET, FILM COATED ORAL
Qty: 30 | Refills: 0
Start: 2020-07-03

## 2020-07-03 RX ORDER — ACETAMINOPHEN 500 MG
2 TABLET ORAL
Qty: 0 | Refills: 0 | DISCHARGE
Start: 2020-07-03

## 2020-07-03 RX ADMIN — PANTOPRAZOLE SODIUM 40 MILLIGRAM(S): 20 TABLET, DELAYED RELEASE ORAL at 05:37

## 2020-07-03 RX ADMIN — Medication 650 MILLIGRAM(S): at 11:15

## 2020-07-03 RX ADMIN — HEPARIN SODIUM 5000 UNIT(S): 5000 INJECTION INTRAVENOUS; SUBCUTANEOUS at 12:27

## 2020-07-03 RX ADMIN — Medication 650 MILLIGRAM(S): at 05:37

## 2020-07-03 RX ADMIN — HEPARIN SODIUM 5000 UNIT(S): 5000 INJECTION INTRAVENOUS; SUBCUTANEOUS at 05:37

## 2020-07-03 RX ADMIN — Medication 650 MILLIGRAM(S): at 00:17

## 2020-07-03 RX ADMIN — LEVETIRACETAM 400 MILLIGRAM(S): 250 TABLET, FILM COATED ORAL at 05:37

## 2020-07-03 NOTE — DISCHARGE NOTE PROVIDER - PROVIDER TOKENS
PROVIDER:[TOKEN:[[id number]],FOLLOWUP:[2 weeks]],PROVIDER:[TOKEN:[[id number]],FOLLOWUP:[1 month]]

## 2020-07-03 NOTE — DISCHARGE NOTE NURSING/CASE MANAGEMENT/SOCIAL WORK - PATIENT PORTAL LINK FT
You can access the FollowMyHealth Patient Portal offered by [facility] by registering at the following website: [email or web address]. By joining [employer]’s FollowMyHealth portal, you will also be able to view your health information using other applications (apps) compatible with our system.

## 2020-07-03 NOTE — CHART NOTE - NSCHARTNOTESELECT_GEN_ALL_CORE
Electrophysiology PA Note
Event Note
Event Note/BMI<19
Transfer Note
Transfer Note/SICU-Telemetry

## 2020-07-03 NOTE — DISCHARGE NOTE PROVIDER - NSDCACTIVITY_GEN_ALL_CORE
Do not make important decisions/Do not drive or operate machinery/No heavy lifting/straining/Showering allowed/Stairs allowed

## 2020-07-03 NOTE — PROGRESS NOTE ADULT - ASSESSMENT
64 yo gentleman POD#4 s/p RIGHT mishel hole for evacuation of subacute SDH.      - Continue with PT    - Awaiting placement of monitor by EP       When placed, likely discharged later today with outpatient Physical therapy    - Will continue to follow    Above plan per Dr Espinoza (covering for Dr Chavez)

## 2020-07-03 NOTE — PROGRESS NOTE ADULT - REASON FOR ADMISSION
Subdural Hematoma
SDH
Subdural Hematoma

## 2020-07-03 NOTE — DISCHARGE NOTE PROVIDER - CARE PROVIDERS DIRECT ADDRESSES
[email or web address]

## 2020-07-03 NOTE — DISCHARGE NOTE PROVIDER - NSDCMRMEDTOKEN_GEN_ALL_CORE_FT
acetaminophen 325 mg oral tablet: 2 tab(s) orally every 6 hours  Keppra 750 mg oral tablet: 1 tab(s) orally every 12 hours   Multiple Vitamins oral tablet: 1 tab(s) orally once a day  senna oral tablet: 2 tab(s) orally once a day (at bedtime)

## 2020-07-03 NOTE — CHART NOTE - NSCHARTNOTEFT_GEN_A_CORE
Electrophysiology PA Note    Event monitor [employer] placed on the patient for 30 days  Bedside teaching provided to patient and family  Follow up with Dr Gauri Bai in 6weeks  [address] [place] [address], [address]  [phone]

## 2020-07-03 NOTE — DISCHARGE NOTE PROVIDER - HOSPITAL COURSE
This is a 65 year old male Cantonese speaking with PMHx of GERD not on any medications who presented with left sided weakness associated with off balanced gait for 2-3 days. The son also reports pt has had weight loss and decreased appetite. Of note in talking with the son the patient admits to hitting his head about one week ago but reported no loss of conscious with it. On the day of presentation he was having worsening left sided weakness with ataxia and unbalanced gait.         In the ED /58, HR 62, T98.8, sat 98% on room air. Initial NIHSS reported 2 but then was 0. CT head noted for 2.5cm right subdural isodensity representing subacute subdural hematoma with 1.6cm midline shift to the left. Noted acute on subacute hemorrhage with noted focal hyerdensities within the subdural collection. Evaluated by Neurosurgery who advised plan for mishel hole. He was admitted to the MICU for monitoring. On 6.29.20 he was taken to the Operating Room where he underwent a Right Mishel Hole for evacuation of SDH. He did well postop with improvement in his weakness and gait. He was noted to have Bradycardia and TTE showed possible vegetation, mass, fibroelastoma or torn chordae on the posterior leaflet of the mitral valve. He was seen by Cardiology who did not feel there was vegitation. He was also seen by EPS who placed an event monitor. He was seen by physical therapy and ambulated well. He was discharged home on 7.3.20

## 2020-07-03 NOTE — DISCHARGE NOTE PROVIDER - CARE PROVIDER_API CALL
Dennis Chavez)  [facility]  [address] [place] [address], [address]  [place] [address]  Phone: [phone]  Fax: [phone]  Follow Up Time: 2 weeks    Gauri Bai)  Cardiovascular Disease; Internal Medicine  [address] [facility], [address]  [place], [place] [id number]  Phone: [phone]  Fax: [phone]  Follow Up Time: 1 month

## 2020-07-03 NOTE — PROGRESS NOTE ADULT - THIS PATIENT HAS THE FOLLOWING CONDITION(S)/DIAGNOSES ON THIS ADMISSION:
None
Brain Compression / Herniation
Brain Compression / Herniation
Cerebral Edema/Brain Compression / Herniation
Brain Compression / Herniation
None

## 2020-07-03 NOTE — PROGRESS NOTE ADULT - SUBJECTIVE AND OBJECTIVE BOX
64 yo gentleman POD#4 s/p RIGHT mishel hole for evacuation of subacute SDH. Patient denies any headaches at this time. Does not complain of any nausea, vomiting, blurry vision or abdominal pain. No compaints of abdominal pain, numbness, tingling or fevers, night sweats, chills.     Admitting HPI:  This is a 65 year old male Cantonese speaking with PMHx of GERD not on any medications who presented with left sided weakness associated with off balanced gait for 2-3 days. The son also reports pt has had weight loss and decreased appetite. Of note in talking with the son the patient admits to hitting his head about one week ago but reported no loss of conscious with it. On the day of presentation he was having worsening left sided weakness with ataxia and unbalanced gait.     In the ED /58, HR 62, T98.8, sat 98% on room air. Initial NIHSS reported 2 but was 0 on my examination. CT head noted for 2.5cm right subdural isodensity representing subacute subdural hematoma with 1.6cm midline shift to the left. Noted acute on subacute hemorrhage with noted focal hyerdensities within the subdural collection. Evaluated by Neurosurgery who advised plan for mishel hole tomorrow with possible craniotomy and ICU monitoring for q1 neurochecks. (28 Jun 2020 20:20)      REVIEW OF SYSTEMS:      All other systems  reviewed as negative other than what was mentioned in the HPI    Vital Signs Last 24 Hrs  T(C): 37.1 (03 Jul 2020 05:00), Max: 37.6 (02 Jul 2020 20:07)  T(F): 98.8 (03 Jul 2020 05:00), Max: 99.6 (02 Jul 2020 20:07)  HR: 62 (03 Jul 2020 05:00) (53 - 62)  BP: 120/59 (03 Jul 2020 05:00) (120/59 - 137/75)  BP(mean): --  RR: 18 (03 Jul 2020 05:00) (18 - 18)  SpO2: 98% (02 Jul 2020 20:30) (98% - 98%)    PHYSICAL EXAM:  GENERAL: NAD, well-groomed, thin gentleman  HEAD:  Atraumatic, normocephalic  WOUND:  clean dry intact    MENTAL STATUS: AAO x3; Awake; Opens eyes spontaneously; Appropriately conversant without aphasia; following simple commands    REFLEXES: PERRL. Pupils 2-2.5 R Corneals intact b/l.   Negative Nam's b/l. Negative clonus b/l  MOTOR: strength 5/5 b/l upper and lower extremities; No pronator drift  SENSATION: grossly intact to light touch all extremities    ABDOMEN: Soft, nontender, nondistended;  EXTREMITIES:  2+ peripheral pulses, no clubbing, cyanosis, or edema  SKIN: Warm, dry; no rashes or lesions

## 2020-07-03 NOTE — DISCHARGE NOTE PROVIDER - NSDCCPCAREPLAN_GEN_ALL_CORE_FT
PRINCIPAL DISCHARGE DIAGNOSIS  Diagnosis: Subdural hematoma  Assessment and Plan of Treatment:       SECONDARY DISCHARGE DIAGNOSES  Diagnosis: Ataxia  Assessment and Plan of Treatment:     Diagnosis: Weakness  Assessment and Plan of Treatment:

## 2020-07-06 PROBLEM — K21.9 GASTRO-ESOPHAGEAL REFLUX DISEASE WITHOUT ESOPHAGITIS: Chronic | Status: ACTIVE | Noted: 2020-06-28

## 2020-07-12 NOTE — CDI QUERY NOTE - NSCDIOTHERTXTBX_GEN_ALL_CORE_HH
ED Provider states: pt. presenting  ”with about 2-3 days of L sided weakness associated with unbalanced/ataxic ambulation and slowed speech, worsened today, not on AC.”  ED Provider assessment states:    “no signs of trauma”    CT Head No Cont (06.28.20 @ 17:55) >  “IMPRESSION:               “  2.5 cm right subdural isodensity, representing subacute subdural hematoma with 1.6 cm midline shift to the left. Focal hyperdensities within the subdural collection, likely representing acute on subacute hemorrhage. “    Pt. was found to have Acute/subacute Subdural hematoma with brain compression.    H&P states:  “Of note in talking with the son the patient admits to hitting his head about one week ago but reported no loss of conscious with it.”  On Physical assessment:  “Head:  Atraumatic, Normocephalic”    Pt. was treated with Keppra IV and underwent Mark hole procedure on 6/29.    Please document diagnosis significant with the above clinical indicators to clarify the diagnosis of Subdural Hematoma:  -	Non-Traumatic Subdural Hematoma  -	Traumatic Subdural Hematoma  -	Other (please specify)  -	Unable to determine    Thank you for your assistance in this matter.

## 2020-07-13 ENCOUNTER — APPOINTMENT (OUTPATIENT)
Dept: NEUROSURGERY | Facility: CLINIC | Age: 65
End: 2020-07-13
Payer: COMMERCIAL

## 2020-07-13 VITALS — BODY MASS INDEX: 21.97 KG/M2 | HEIGHT: 67 IN | WEIGHT: 140 LBS

## 2020-07-13 PROCEDURE — 99024 POSTOP FOLLOW-UP VISIT: CPT

## 2020-07-14 NOTE — HISTORY OF PRESENT ILLNESS
[FreeTextEntry1] : Mr. Delgadillo presents today accompanied by his son, s/p right mishel hole for evacuation of SDH on 6/29. Initially presented to [facility] with left sided weakness with imbalance gait after hitting his head a week before admission. Reports of generalized weakness. Denies headaches, aphasia, and seizures. Continues to be on keppra 750mg. Remove staples. Surgical site site is clean, dry, and intact. He is doing overall very well.

## 2020-07-14 NOTE — PHYSICAL EXAM
[FreeTextEntry1] : Constitutional: Well appearing, no distress\par HEENT: Normocephalic Atraumatic\par Psychiatric: Alert and oriented to all spheres, normal mood\par Pulmonary: no respiratory distress\par Abdomen: non-distended\par Vascular/Extremities: no edema, no cyanosis, no clubbing\par \par \par Neurologic: \par CN II-XII grossly intact\par ROM: Full in cervical and lumbar spine\par Palpation: no pain to palpation in cervical spine, no pain to palpation in lumbar spine\par Strength: Full strength in all major muscle groups, no atrophy\par Sensation: Full sensation to light touch in all extremities\par Reflexes: \par               2+ patellar\par               2+ biceps\par               2+ ankle jerk\par              No Nam's\par              No clonus\par              No babinski\par \par Signs:\par SLR negative\par L'hermitte's negative\par \par Gait: toe, heel, tandem fluid\par \par \par \par \par healing wound

## 2020-08-10 ENCOUNTER — APPOINTMENT (OUTPATIENT)
Dept: NEUROSURGERY | Facility: CLINIC | Age: 65
End: 2020-08-10
Payer: COMMERCIAL

## 2020-08-10 ENCOUNTER — OUTPATIENT (OUTPATIENT)
Dept: OUTPATIENT SERVICES | Facility: HOSPITAL | Age: 65
LOS: 1 days | Discharge: HOME | End: 2020-08-10
Payer: COMMERCIAL

## 2020-08-10 VITALS — HEIGHT: 67 IN | WEIGHT: 117 LBS | BODY MASS INDEX: 18.36 KG/M2

## 2020-08-10 DIAGNOSIS — S06.5X9A TRAUMATIC SUBDURAL HEMORRHAGE WITH LOSS OF CONSCIOUSNESS OF UNSPECIFIED DURATION, INITIAL ENCOUNTER: ICD-10-CM

## 2020-08-10 PROCEDURE — 70450 CT HEAD/BRAIN W/O DYE: CPT | Mod: 26

## 2020-08-10 PROCEDURE — 99024 POSTOP FOLLOW-UP VISIT: CPT

## 2020-08-11 NOTE — HISTORY OF PRESENT ILLNESS
[FreeTextEntry1] : Mr. Delgadillo is presents today accompanied by his son and his wife, he is s/p right mishel hole for evacuation of SDH on 6/29. Doing well. Reports of weakness. Has atrophy in his left leg. Denies H/A, gait disturbances, confusion, and seizures. He is due for CTH today which will determine the treatment plan.

## 2020-08-11 NOTE — PHYSICAL EXAM
[FreeTextEntry1] : Constitutional: Well appearing, no distress\par HEENT: Normocephalic Atraumatic\par Psychiatric: Alert and oriented to all spheres, normal mood\par Pulmonary: no respiratory distress\par Abdomen: non-distended\par Vascular/Extremities: no edema, no cyanosis, no clubbing\par \par \par Neurologic: \par CN II-XII grossly intact\par ROM: Full in cervical and lumbar spine\par Palpation: no pain to palpation in cervical spine, no pain to palpation in lumbar spine\par Strength: Full strength in all major muscle groups, no atrophy\par Sensation: Full sensation to light touch in all extremities\par Reflexes: \par               2+ patellar\par               2+ biceps\par               2+ ankle jerk\par              No Nam's\par              No clonus\par              No babinski\par \par Signs:\par SLR negative\par L'hermitte's negative\par \par Gait:  fluid\par \par \par incision well healed\par \par

## 2020-08-25 ENCOUNTER — APPOINTMENT (OUTPATIENT)
Dept: NEUROLOGY | Facility: CLINIC | Age: 65
End: 2020-08-25
Payer: COMMERCIAL

## 2020-08-25 VITALS
WEIGHT: 117 LBS | OXYGEN SATURATION: 98 % | SYSTOLIC BLOOD PRESSURE: 143 MMHG | TEMPERATURE: 97.7 F | DIASTOLIC BLOOD PRESSURE: 78 MMHG | HEART RATE: 54 BPM | BODY MASS INDEX: 18.36 KG/M2 | HEIGHT: 67 IN

## 2020-08-25 PROCEDURE — 95816 EEG AWAKE AND DROWSY: CPT

## 2020-08-25 PROCEDURE — 99204 OFFICE O/P NEW MOD 45 MIN: CPT

## 2020-08-25 RX ORDER — MULTIVITAMIN
TABLET ORAL
Refills: 0 | Status: ACTIVE | COMMUNITY

## 2020-08-25 RX ORDER — GLUCOSAMINE HCL/CHONDROITIN SU 500-400 MG
CAPSULE ORAL
Refills: 0 | Status: ACTIVE | COMMUNITY

## 2020-08-25 RX ORDER — PSYLLIUM HUSK 0.4 G
CAPSULE ORAL
Refills: 0 | Status: ACTIVE | COMMUNITY

## 2020-08-26 ENCOUNTER — APPOINTMENT (OUTPATIENT)
Dept: CARDIOLOGY | Facility: CLINIC | Age: 65
End: 2020-08-26
Payer: COMMERCIAL

## 2020-08-26 VITALS — SYSTOLIC BLOOD PRESSURE: 144 MMHG | TEMPERATURE: 97.2 F | DIASTOLIC BLOOD PRESSURE: 88 MMHG | HEART RATE: 54 BPM

## 2020-08-26 DIAGNOSIS — R00.1 BRADYCARDIA, UNSPECIFIED: ICD-10-CM

## 2020-08-26 DIAGNOSIS — Z78.9 OTHER SPECIFIED HEALTH STATUS: ICD-10-CM

## 2020-08-26 DIAGNOSIS — Z87.19 PERSONAL HISTORY OF OTHER DISEASES OF THE DIGESTIVE SYSTEM: ICD-10-CM

## 2020-08-26 PROCEDURE — 99213 OFFICE O/P EST LOW 20 MIN: CPT

## 2020-08-26 PROCEDURE — 93228 REMOTE 30 DAY ECG REV/REPORT: CPT

## 2020-08-26 PROCEDURE — 93000 ELECTROCARDIOGRAM COMPLETE: CPT | Mod: 59

## 2020-08-26 NOTE — HISTORY OF PRESENT ILLNESS
[FreeTextEntry1] : \par Cardiologist: None\par \par 66 yo Cantonese speaking M with history of GERD and recent hospitalization at the end of June for head trauma and 2.5 cm right subdural hemorrhage with midline shift s/p [place] Hole 6/29. EP was consulted for sinus bradycardia. 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 TSH was normal. Patient was asymptomatic and is here today to follow up on event monitor. \par \par The patient denies any cardiovascular complaints including chest pain, dyspnea, palpitations, dizziness, lightheadedness, presyncope or syncope.\par

## 2020-08-26 NOTE — ASSESSMENT
[FreeTextEntry1] : Mr. Delgadillo presents with his son for follow up from hospitalization for sinus bradycardia in the setting of SDH s/p Mark hole surgery. Event monitor reveals sinus bradycardia with slowest HR recorded during the middle of the night while sleeping. I have asked the patient to follow up with pulm to be evaluated for sleep apnea as he does have a history of snoring. He already has an appointment with pulm. He is asymptomatic with sinus bradycardia and it is likely that this is all secondary to the SDH but he needs to be worked up for sleep apnea. \par \par At this time we discussed routine clinical surveillance for sinus bradycardia. I have also advised the patient to go to the nearest emergency room if he experiences any chest pain, dyspnea, syncope, or has any other compelling symptoms.\par \par I have also referred the patient to general cardiology to establish care. \par \par Follow up in 6 months or sooner if he develops dizziness, lightheadedness, presyncope or syncope.

## 2020-08-26 NOTE — PHYSICAL EXAM
[General Appearance - Well Developed] : well developed [Normal Appearance] : normal appearance [Well Groomed] : well groomed [General Appearance - Well Nourished] : well nourished [No Deformities] : no deformities [General Appearance - In No Acute Distress] : no acute distress [Normal Conjunctiva] : the conjunctiva exhibited no abnormalities [Respiration, Rhythm And Depth] : normal respiratory rhythm and effort [Exaggerated Use Of Accessory Muscles For Inspiration] : no accessory muscle use [Auscultation Breath Sounds / Voice Sounds] : lungs were clear to auscultation bilaterally [Heart Sounds] : normal S1 and S2 [Murmurs] : no murmurs present [Edema] : no peripheral edema present [FreeTextEntry1] : bradycardic [Abdomen Soft] : soft [Abnormal Walk] : normal gait [Nail Clubbing] : no clubbing of the fingernails [] : no rash [Skin Color & Pigmentation] : normal skin color and pigmentation [Oriented To Time, Place, And Person] : oriented to person, place, and time [No Anxiety] : not feeling anxious

## 2020-08-26 NOTE — REASON FOR VISIT
[Follow-Up - From Hospitalization] : follow-up of a recent hospitalization for [Discharge Date: ___] : Discharge Date: [unfilled] [Other: _____] : [unfilled] [FreeTextEntry1] : bradycardia after SDH

## 2020-08-28 NOTE — ASSESSMENT
[FreeTextEntry1] : A 65 years old right handed man with prior right SDH and s/p mishel hole who referred by Dr. Chavez to this clinic for initial evaluation for more collection of subacute on chronic right SDH for MMA embolization. The risks/benefits of MMA embolization were discussed with patient and his son. However, they wish to further discuss with rest of family and will update our team if they agree to proceed. \par \par PLAN:\par - MMA Embolization if patient agrees\par - Obtain CT head 1 day prior or morning of procedure\par - Neuro management per Neurosurgery team\par - Medical management per PMD\par - Instructed patient to call 911 or report to ED if any acute neurological changes occur\par \par

## 2020-08-28 NOTE — HISTORY OF PRESENT ILLNESS
[FreeTextEntry1] : Mr. Delgadillo is a 65 years old right handed man who presents to clinic for initial evaluation for subacute/chronic right SDH referred by Dr. Chavez. Patient is with history of "bump in his head" per son and developed left sided weakness and brought him to [facility] clinic and then to ED and was found to have right SDH. He underwent s/p right Mark hole evacuation on 6/29/20. He was being followed with outpatient Neurosurgeon and on most recent CTH 8/10th it reports of residual right subacute/chronic SDH and was referred for evaluation of MMA embolization. Today, patient with son and the risks/benefits of MMA embolization were discussed. Patient also remains neurologically stable.

## 2020-08-28 NOTE — DATA REVIEWED
[de-identified] : EXAM: CT BRAIN \par \par \par PROCEDURE DATE: 08/10/2020 \par \par \par \par \par INTERPRETATION: Clinical History / Reason for exam: History of trauma (July) and subdural hemorrhage, status post mishel hole evacuation \par \par TECHNIQUE: Contiguous axial CT images were obtained from the base of the skull to the vertex without administration of intravenous contrast. Coronal and sagittal reformatted images were constructed. \par \par COMPARISON:CT HEAD 7/1/2020, \par \par \par FINDINGS: \par \par Redemonstration of right parietal mishel hole craniotomy for evacuation of a right hemispheric subdural collection. There is now mixed hyperdense blood products within the right subdural collection. It measures approximately 1.9 cm in greatest thickness in the right frontal region, increased since the most recent examination of 7/1/2020 where it measured 1.2 cm. There is approximately 4 mm of midline shift to the left which is slightly decreased since the most recent examination. Interval resolution of previously seen extra-axial air. \par \par Similar-appearing left convexity subdural collection measures approximately 5 mm in greatest thickness and is predominantly hypodense. \par \par There is no evidence of new intracerebral hemorrhage or large territorial infarct. \par \par There are scattered patchy hypodensities throughout the hemispheric white matter, compatible with chronic microvascular ischemic changes. \par \par The calvarium is intact. Visualized paranasal sinuses and mastoids are well-aerated. \par \par \par IMPRESSION: \par Redemonstration of right parietal mishel hole craniotomy changes with residual mixed density subdural collection with new hyperdense blood products measuring approximately 1.9 cm in greatest thickness, previously 1.2 cm, concerning for rebleeding. \par Approximately 4 mm midline shift to the left is slightly decreased since the most recent examination of 7/1/2020. \par Decreased thickness of a low density subdural collection overlying the left parietal lobe now measuring approximately 5 mm in greatest thickness, previously 8 mm.

## 2020-08-28 NOTE — PHYSICAL EXAM
[FreeTextEntry1] : Neurologic Exam:\par \par Mental status: Awake, alert and oriented x 4. Recent and remote memory intact. \par Language: Follows all commands. Naming, repetition and comprehension intact. Attention/concentration intact. No dysarthria, no aphasia. \par Cranial nerves: B/l pupils equally round and reactive to light, visual fields full, no nystagmus, EOMI, face symmetric, hearing intact bilaterally, palate elevation symmetric, tongue was midline, sternocleidomastoid/ shoulder shrug strength bilaterally 5/5.  \par Motor:  Normal bulk and tone, strength 5/5 in bilateral upper and lower extremities.   strength 5/5. Decreased rapid alternating movements intact on left and asymmetric. \par Sensation: Intact to light touch.  No neglect. \par Coordination: No dysmetria on finger-to-nose and heel-to-shin. Mild clumsiness.\par Reflexes: 2+ in upper and lower extremities, downgoing toes bilaterally\par Gait: Steady, negative Romberg and Tandem\par \par Cardio: +S1S2 No M/R/G\par Pulm: CTA B/L no W/R/R\par Skin: Warm, Dry, Capillary refill <2 seconds, good skin turgor\par Abd: Soft, NTND\par Ext: no c/c/e\par \par NIH STROKE SCALE\par \par Item	                                                        Score\par 1 a.	Level of Consciousness	            0\par 1 b.          LOC Questions	                            0\par 1 c.	LOC Commands	                            0\par 2.	Best Gaze	                            0\par 3.	Visual	                                            0\par 4.	Facial Palsy	                            0\par 5 a.	Motor Arm - Left	                            0\par 5 b.	Motor Arm - Right	                            0\par 6 a.	Motor Leg - Left	                            0\par 6 b.	Motor Leg - Right	                            0\par 7.	Limb Ataxia	                            0\par 8.	Sensory	                                            0\par 9.	Language	                            0\par 10.	Dysarthria	                            0\par 11.	Extinction and Inattention  	            0\par ___________________________________________\par TOTAL	                                                            0\par \par mRS: 0 No symptoms at all\par \par

## 2020-11-25 ENCOUNTER — APPOINTMENT (OUTPATIENT)
Dept: NEUROSURGERY | Facility: CLINIC | Age: 65
End: 2020-11-25
Payer: COMMERCIAL

## 2020-11-25 PROCEDURE — 99213 OFFICE O/P EST LOW 20 MIN: CPT

## 2020-11-25 PROCEDURE — 99072 ADDL SUPL MATRL&STAF TM PHE: CPT

## 2020-12-09 NOTE — PHYSICAL EXAM
[FreeTextEntry1] : Constitutional: Well appearing, no distress\par HEENT: Normocephalic Atraumatic\par Psychiatric: Alert and oriented to all spheres, normal mood\par Pulmonary: no respiratory distress\par Abdomen: non-distended\par Vascular/Extremities: no edema, no cyanosis, no clubbing\par \par \par Neurologic: \par CN II-XII grossly intact\par ROM: Full in cervical and lumbar spine\par Palpation: no pain to palpation in cervical spine, no pain to palpation in lumbar spine\par Strength: Full strength in all major muscle groups, no atrophy\par Sensation: Full sensation to light touch in all extremities\par Reflexes: \par               2+ patellar\par               2+ biceps\par               2+ ankle jerk\par              No Nam's\par              No clonus\par              No babinski\par \par Signs:\par SLR negative\par L'hermitte's negative\par \par Gait: toe, heel, tandem fluid\par \par \par \par \par wound healed

## 2020-12-12 ENCOUNTER — OUTPATIENT (OUTPATIENT)
Dept: OUTPATIENT SERVICES | Facility: HOSPITAL | Age: 65
LOS: 1 days | Discharge: HOME | End: 2020-12-12
Payer: COMMERCIAL

## 2020-12-12 DIAGNOSIS — S06.5X9D TRAUMATIC SUBDURAL HEMORRHAGE WITH LOSS OF CONSCIOUSNESS OF UNSPECIFIED DURATION, SUBSEQUENT ENCOUNTER: ICD-10-CM

## 2020-12-12 DIAGNOSIS — S06.5X9A TRAUMATIC SUBDURAL HEMORRHAGE WITH LOSS OF CONSCIOUSNESS OF UNSPECIFIED DURATION, INITIAL ENCOUNTER: ICD-10-CM

## 2020-12-12 PROCEDURE — 70450 CT HEAD/BRAIN W/O DYE: CPT | Mod: 26

## 2021-01-15 NOTE — OCCUPATIONAL THERAPY INITIAL EVALUATION ADULT - BED MOBILITY/TRANSFERS, PREVIOUS LEVEL OF FUNCTION, OT EVAL
1/15/2021 2:31 PM 
 
Mr. Sharath Ryder [address] [place] [id number] 
 
1/15/2021 Name: Sharath Ryder MRN: [id number] YOB: 2018 Date of Visit: 1/15/2021 Dear Dr. Tita Felton MD,  
 
I had the opportunity to see your patient, Sharath Ryder, age 1 y.o. in the Pediatric Gastroenterology office on 1/15/2021 for evaluation of his: 1. Constipation, unspecified constipation type 2. Pain with bowel movements 3. Voluntary holding of bowel movements Today's visit included: 
 
Impression: 
 
Sharath Ryder is a 1 y.o. male being seen today in new consultation in pediatric GI clinic secondary to issues with chronic constipation associated with perianal pain during bowel movements from 1 year of age and withholding behavior. .  He has been treated intermittently with MiraLAX with moderate improvement in symptoms. However he has not been treated consistently for constipation. He is well-appearing on examination with appropriate growth and weight gain. He most likely has functional constipation. Sharath Ryder is growing well, had normal stools first year of life, has normal neurologic ( Spinal exam, DTRs,  anal wink and gait) and rectal exam making anorectal malformation and Hirschsprung's disease less likely. Other diagnoses to consider include celiac disease or hypothyroidism though these pathologies are less likely. So we would obtain work up to exclude these pathologies. Plan: Bowel clean out:  
 Miralax 3 capful in 12 oz of liquid over 2 hours once a week x 2 weeks Start Miralax 0.5-1 capful in 4 oz of liquid once daily and adjust the dose depending on frequency and consistency of bowel movements Increase water and fiber intake Labs today as below Senna 3 ml once at bed time Follow up in 6 weeks Restrict milk and milk products such as cheese, yogurt Orders Placed This Encounter  CBC WITH AUTOMATED DIFF Standing Status:   Future Standing Expiration Date:   1/15/2022  METABOLIC PANEL, COMPREHENSIVE Standing Status:   Future Standing Expiration Date:   1/15/2022  T4, FREE Standing Status:   Future Standing Expiration Date:   1/15/2022  TSH 3RD GENERATION Standing Status:   Future Standing Expiration Date:   1/15/2022  CELIAC ANTIBODY PROFILE  sennosides (Senna) 8.8 mg/5 mL syrup Sig: Take 3 mL by mouth nightly. Dispense:  1 Bottle Refill:  1 Thank you very much for allowing me to participate in Andrew's care. Please do not hesitate to contact our office with any questions or concerns.   
 
 
 
 
 
Sincerely, 
 
 
Danny Schreiber MD 
 
 independent

## 2021-02-17 ENCOUNTER — APPOINTMENT (OUTPATIENT)
Dept: CARDIOLOGY | Facility: CLINIC | Age: 66
End: 2021-02-17

## 2022-11-16 NOTE — HISTORY OF PRESENT ILLNESS
[FreeTextEntry1] : s/p mishel hole evacuation of SDH. Clinically stable. No dizziness, headaches, imbalance, speech delay. Pt had residual righty SDH on follow up scan. Referred for MMA consult with Dr. Locke. Pt decided not to proceed with MMA.  5

## 2023-05-03 ENCOUNTER — OUTPATIENT (OUTPATIENT)
Dept: OUTPATIENT SERVICES | Facility: HOSPITAL | Age: 68
LOS: 1 days | Discharge: HOME | End: 2023-05-03

## 2023-05-03 DIAGNOSIS — D64.9 ANEMIA, UNSPECIFIED: ICD-10-CM

## 2023-05-12 ENCOUNTER — RESULT REVIEW (OUTPATIENT)
Age: 68
End: 2023-05-12

## 2023-05-12 ENCOUNTER — APPOINTMENT (OUTPATIENT)
Dept: INFUSION THERAPY | Facility: HOSPITAL | Age: 68
End: 2023-05-12

## 2023-05-12 ENCOUNTER — NON-APPOINTMENT (OUTPATIENT)
Age: 68
End: 2023-05-12

## 2023-05-12 DIAGNOSIS — R11.2 NAUSEA WITH VOMITING, UNSPECIFIED: ICD-10-CM

## 2023-05-12 DIAGNOSIS — Z51.11 ENCOUNTER FOR ANTINEOPLASTIC CHEMOTHERAPY: ICD-10-CM

## 2023-05-12 DIAGNOSIS — C20 MALIGNANT NEOPLASM OF RECTUM: ICD-10-CM

## 2023-05-12 LAB
BASOPHILS # BLD AUTO: 0.04 K/UL — SIGNIFICANT CHANGE UP (ref 0–0.2)
BASOPHILS NFR BLD AUTO: 0.5 % — SIGNIFICANT CHANGE UP (ref 0–2)
EOSINOPHIL # BLD AUTO: 0.03 K/UL — SIGNIFICANT CHANGE UP (ref 0–0.5)
EOSINOPHIL NFR BLD AUTO: 0.4 % — SIGNIFICANT CHANGE UP (ref 0–6)
HCT VFR BLD CALC: 39.8 % — SIGNIFICANT CHANGE UP (ref 39–50)
HGB BLD-MCNC: 13.3 G/DL — SIGNIFICANT CHANGE UP (ref 13–17)
IMM GRANULOCYTES NFR BLD AUTO: 0.4 % — SIGNIFICANT CHANGE UP (ref 0–0.9)
LYMPHOCYTES # BLD AUTO: 0.57 K/UL — LOW (ref 1–3.3)
LYMPHOCYTES # BLD AUTO: 7.5 % — LOW (ref 13–44)
MCHC RBC-ENTMCNC: 30 PG — SIGNIFICANT CHANGE UP (ref 27–34)
MCHC RBC-ENTMCNC: 33.4 G/DL — SIGNIFICANT CHANGE UP (ref 32–36)
MCV RBC AUTO: 89.6 FL — SIGNIFICANT CHANGE UP (ref 80–100)
MONOCYTES # BLD AUTO: 0.89 K/UL — SIGNIFICANT CHANGE UP (ref 0–0.9)
MONOCYTES NFR BLD AUTO: 11.7 % — SIGNIFICANT CHANGE UP (ref 2–14)
NEUTROPHILS # BLD AUTO: 6.03 K/UL — SIGNIFICANT CHANGE UP (ref 1.8–7.4)
NEUTROPHILS NFR BLD AUTO: 79.5 % — HIGH (ref 43–77)
NRBC # BLD: 0 /100 WBCS — SIGNIFICANT CHANGE UP (ref 0–0)
PLATELET # BLD AUTO: 432 K/UL — HIGH (ref 150–400)
RBC # BLD: 4.44 M/UL — SIGNIFICANT CHANGE UP (ref 4.2–5.8)
RBC # FLD: 15 % — HIGH (ref 10.3–14.5)
WBC # BLD: 7.59 K/UL — SIGNIFICANT CHANGE UP (ref 3.8–10.5)
WBC # FLD AUTO: 7.59 K/UL — SIGNIFICANT CHANGE UP (ref 3.8–10.5)

## 2023-05-15 ENCOUNTER — NON-APPOINTMENT (OUTPATIENT)
Age: 68
End: 2023-05-15

## 2023-05-26 ENCOUNTER — APPOINTMENT (OUTPATIENT)
Dept: INFUSION THERAPY | Facility: HOSPITAL | Age: 68
End: 2023-05-26

## 2023-05-26 ENCOUNTER — RESULT REVIEW (OUTPATIENT)
Age: 68
End: 2023-05-26

## 2023-05-26 LAB
BASOPHILS # BLD AUTO: 0.03 K/UL — SIGNIFICANT CHANGE UP (ref 0–0.2)
BASOPHILS NFR BLD AUTO: 0.3 % — SIGNIFICANT CHANGE UP (ref 0–2)
EOSINOPHIL # BLD AUTO: 0.21 K/UL — SIGNIFICANT CHANGE UP (ref 0–0.5)
EOSINOPHIL NFR BLD AUTO: 2.4 % — SIGNIFICANT CHANGE UP (ref 0–6)
HCT VFR BLD CALC: 40.9 % — SIGNIFICANT CHANGE UP (ref 39–50)
HGB BLD-MCNC: 13.3 G/DL — SIGNIFICANT CHANGE UP (ref 13–17)
IMM GRANULOCYTES NFR BLD AUTO: 0.3 % — SIGNIFICANT CHANGE UP (ref 0–0.9)
LYMPHOCYTES # BLD AUTO: 1.06 K/UL — SIGNIFICANT CHANGE UP (ref 1–3.3)
LYMPHOCYTES # BLD AUTO: 12 % — LOW (ref 13–44)
MCHC RBC-ENTMCNC: 29.8 PG — SIGNIFICANT CHANGE UP (ref 27–34)
MCHC RBC-ENTMCNC: 32.5 G/DL — SIGNIFICANT CHANGE UP (ref 32–36)
MCV RBC AUTO: 91.5 FL — SIGNIFICANT CHANGE UP (ref 80–100)
MONOCYTES # BLD AUTO: 1.02 K/UL — HIGH (ref 0–0.9)
MONOCYTES NFR BLD AUTO: 11.5 % — SIGNIFICANT CHANGE UP (ref 2–14)
NEUTROPHILS # BLD AUTO: 6.5 K/UL — SIGNIFICANT CHANGE UP (ref 1.8–7.4)
NEUTROPHILS NFR BLD AUTO: 73.5 % — SIGNIFICANT CHANGE UP (ref 43–77)
NRBC # BLD: 0 /100 WBCS — SIGNIFICANT CHANGE UP (ref 0–0)
PLATELET # BLD AUTO: 364 K/UL — SIGNIFICANT CHANGE UP (ref 150–400)
RBC # BLD: 4.47 M/UL — SIGNIFICANT CHANGE UP (ref 4.2–5.8)
RBC # FLD: 16.1 % — HIGH (ref 10.3–14.5)
WBC # BLD: 8.85 K/UL — SIGNIFICANT CHANGE UP (ref 3.8–10.5)
WBC # FLD AUTO: 8.85 K/UL — SIGNIFICANT CHANGE UP (ref 3.8–10.5)

## 2023-06-09 ENCOUNTER — APPOINTMENT (OUTPATIENT)
Dept: HEMATOLOGY ONCOLOGY | Facility: CLINIC | Age: 68
End: 2023-06-09

## 2023-06-09 ENCOUNTER — APPOINTMENT (OUTPATIENT)
Dept: INFUSION THERAPY | Facility: HOSPITAL | Age: 68
End: 2023-06-09

## 2023-06-11 DIAGNOSIS — Z51.89 ENCOUNTER FOR OTHER SPECIFIED AFTERCARE: ICD-10-CM

## 2023-06-12 ENCOUNTER — NON-APPOINTMENT (OUTPATIENT)
Age: 68
End: 2023-06-12

## 2023-06-23 ENCOUNTER — APPOINTMENT (OUTPATIENT)
Dept: INFUSION THERAPY | Facility: HOSPITAL | Age: 68
End: 2023-06-23

## 2023-06-23 ENCOUNTER — APPOINTMENT (OUTPATIENT)
Dept: HEMATOLOGY ONCOLOGY | Facility: CLINIC | Age: 68
End: 2023-06-23

## 2023-06-27 ENCOUNTER — OUTPATIENT (OUTPATIENT)
Dept: OUTPATIENT SERVICES | Facility: HOSPITAL | Age: 68
LOS: 1 days | Discharge: HOME | End: 2023-06-27

## 2023-06-27 DIAGNOSIS — D64.9 ANEMIA, UNSPECIFIED: ICD-10-CM

## 2023-07-07 ENCOUNTER — APPOINTMENT (OUTPATIENT)
Dept: HEMATOLOGY ONCOLOGY | Facility: CLINIC | Age: 68
End: 2023-07-07

## 2023-07-07 ENCOUNTER — APPOINTMENT (OUTPATIENT)
Dept: INFUSION THERAPY | Facility: HOSPITAL | Age: 68
End: 2023-07-07

## 2023-07-10 DIAGNOSIS — R11.2 NAUSEA WITH VOMITING, UNSPECIFIED: ICD-10-CM

## 2023-07-10 DIAGNOSIS — Z51.11 ENCOUNTER FOR ANTINEOPLASTIC CHEMOTHERAPY: ICD-10-CM

## 2023-07-10 DIAGNOSIS — C20 MALIGNANT NEOPLASM OF RECTUM: ICD-10-CM

## 2023-07-21 ENCOUNTER — APPOINTMENT (OUTPATIENT)
Dept: HEMATOLOGY ONCOLOGY | Facility: CLINIC | Age: 68
End: 2023-07-21

## 2023-07-21 ENCOUNTER — APPOINTMENT (OUTPATIENT)
Dept: INFUSION THERAPY | Facility: HOSPITAL | Age: 68
End: 2023-07-21

## 2023-07-24 ENCOUNTER — NON-APPOINTMENT (OUTPATIENT)
Age: 68
End: 2023-07-24

## 2023-09-13 ENCOUNTER — OUTPATIENT (OUTPATIENT)
Dept: OUTPATIENT SERVICES | Facility: HOSPITAL | Age: 68
LOS: 1 days | Discharge: HOME | End: 2023-09-13

## 2023-09-13 DIAGNOSIS — D64.9 ANEMIA, UNSPECIFIED: ICD-10-CM

## 2023-09-15 ENCOUNTER — NON-APPOINTMENT (OUTPATIENT)
Age: 68
End: 2023-09-15

## 2023-09-15 ENCOUNTER — APPOINTMENT (OUTPATIENT)
Dept: INFUSION THERAPY | Facility: HOSPITAL | Age: 68
End: 2023-09-15

## 2023-09-18 DIAGNOSIS — Z51.11 ENCOUNTER FOR ANTINEOPLASTIC CHEMOTHERAPY: ICD-10-CM

## 2023-09-18 DIAGNOSIS — R11.2 NAUSEA WITH VOMITING, UNSPECIFIED: ICD-10-CM

## 2023-09-18 DIAGNOSIS — C20 MALIGNANT NEOPLASM OF RECTUM: ICD-10-CM

## 2023-09-29 ENCOUNTER — APPOINTMENT (OUTPATIENT)
Dept: INFUSION THERAPY | Facility: HOSPITAL | Age: 68
End: 2023-09-29

## 2023-10-13 ENCOUNTER — APPOINTMENT (OUTPATIENT)
Dept: INFUSION THERAPY | Facility: HOSPITAL | Age: 68
End: 2023-10-13

## 2023-10-13 ENCOUNTER — APPOINTMENT (OUTPATIENT)
Dept: HEMATOLOGY ONCOLOGY | Facility: CLINIC | Age: 68
End: 2023-10-13

## 2023-10-13 DIAGNOSIS — R56.9 UNSPECIFIED CONVULSIONS: ICD-10-CM

## 2023-10-27 ENCOUNTER — APPOINTMENT (OUTPATIENT)
Dept: HEMATOLOGY ONCOLOGY | Facility: CLINIC | Age: 68
End: 2023-10-27

## 2023-10-27 ENCOUNTER — NON-APPOINTMENT (OUTPATIENT)
Age: 68
End: 2023-10-27

## 2023-10-27 ENCOUNTER — APPOINTMENT (OUTPATIENT)
Dept: INFUSION THERAPY | Facility: HOSPITAL | Age: 68
End: 2023-10-27

## 2023-11-09 ENCOUNTER — OUTPATIENT (OUTPATIENT)
Dept: OUTPATIENT SERVICES | Facility: HOSPITAL | Age: 68
LOS: 1 days | Discharge: HOME | End: 2023-11-09

## 2023-11-09 DIAGNOSIS — D64.9 ANEMIA, UNSPECIFIED: ICD-10-CM

## 2023-11-10 ENCOUNTER — RESULT REVIEW (OUTPATIENT)
Age: 68
End: 2023-11-10

## 2023-11-10 ENCOUNTER — APPOINTMENT (OUTPATIENT)
Dept: HEMATOLOGY ONCOLOGY | Facility: CLINIC | Age: 68
End: 2023-11-10

## 2023-11-10 ENCOUNTER — APPOINTMENT (OUTPATIENT)
Dept: INFUSION THERAPY | Facility: HOSPITAL | Age: 68
End: 2023-11-10

## 2023-11-10 LAB
BASOPHILS # BLD AUTO: 0.04 K/UL — SIGNIFICANT CHANGE UP (ref 0–0.2)
BASOPHILS # BLD AUTO: 0.04 K/UL — SIGNIFICANT CHANGE UP (ref 0–0.2)
BASOPHILS NFR BLD AUTO: 1 % — SIGNIFICANT CHANGE UP (ref 0–2)
BASOPHILS NFR BLD AUTO: 1 % — SIGNIFICANT CHANGE UP (ref 0–2)
EOSINOPHIL # BLD AUTO: 0.09 K/UL — SIGNIFICANT CHANGE UP (ref 0–0.5)
EOSINOPHIL # BLD AUTO: 0.09 K/UL — SIGNIFICANT CHANGE UP (ref 0–0.5)
EOSINOPHIL NFR BLD AUTO: 2.2 % — SIGNIFICANT CHANGE UP (ref 0–6)
EOSINOPHIL NFR BLD AUTO: 2.2 % — SIGNIFICANT CHANGE UP (ref 0–6)
HCT VFR BLD CALC: 37.6 % — LOW (ref 39–50)
HCT VFR BLD CALC: 37.6 % — LOW (ref 39–50)
HGB BLD-MCNC: 13.1 G/DL — SIGNIFICANT CHANGE UP (ref 13–17)
HGB BLD-MCNC: 13.1 G/DL — SIGNIFICANT CHANGE UP (ref 13–17)
IMM GRANULOCYTES NFR BLD AUTO: 0.5 % — SIGNIFICANT CHANGE UP (ref 0–0.9)
IMM GRANULOCYTES NFR BLD AUTO: 0.5 % — SIGNIFICANT CHANGE UP (ref 0–0.9)
LYMPHOCYTES # BLD AUTO: 0.57 K/UL — LOW (ref 1–3.3)
LYMPHOCYTES # BLD AUTO: 0.57 K/UL — LOW (ref 1–3.3)
LYMPHOCYTES # BLD AUTO: 13.6 % — SIGNIFICANT CHANGE UP (ref 13–44)
LYMPHOCYTES # BLD AUTO: 13.6 % — SIGNIFICANT CHANGE UP (ref 13–44)
MCHC RBC-ENTMCNC: 34.8 G/DL — SIGNIFICANT CHANGE UP (ref 32–36)
MCHC RBC-ENTMCNC: 34.8 G/DL — SIGNIFICANT CHANGE UP (ref 32–36)
MCHC RBC-ENTMCNC: 36.6 PG — HIGH (ref 27–34)
MCHC RBC-ENTMCNC: 36.6 PG — HIGH (ref 27–34)
MCV RBC AUTO: 105 FL — HIGH (ref 80–100)
MCV RBC AUTO: 105 FL — HIGH (ref 80–100)
MONOCYTES # BLD AUTO: 0.62 K/UL — SIGNIFICANT CHANGE UP (ref 0–0.9)
MONOCYTES # BLD AUTO: 0.62 K/UL — SIGNIFICANT CHANGE UP (ref 0–0.9)
MONOCYTES NFR BLD AUTO: 14.8 % — HIGH (ref 2–14)
MONOCYTES NFR BLD AUTO: 14.8 % — HIGH (ref 2–14)
NEUTROPHILS # BLD AUTO: 2.84 K/UL — SIGNIFICANT CHANGE UP (ref 1.8–7.4)
NEUTROPHILS # BLD AUTO: 2.84 K/UL — SIGNIFICANT CHANGE UP (ref 1.8–7.4)
NEUTROPHILS NFR BLD AUTO: 67.9 % — SIGNIFICANT CHANGE UP (ref 43–77)
NEUTROPHILS NFR BLD AUTO: 67.9 % — SIGNIFICANT CHANGE UP (ref 43–77)
NRBC # BLD: 0 /100 WBCS — SIGNIFICANT CHANGE UP (ref 0–0)
NRBC # BLD: 0 /100 WBCS — SIGNIFICANT CHANGE UP (ref 0–0)
PLATELET # BLD AUTO: 138 K/UL — LOW (ref 150–400)
PLATELET # BLD AUTO: 138 K/UL — LOW (ref 150–400)
RBC # BLD: 3.58 M/UL — LOW (ref 4.2–5.8)
RBC # BLD: 3.58 M/UL — LOW (ref 4.2–5.8)
RBC # FLD: 16.3 % — HIGH (ref 10.3–14.5)
RBC # FLD: 16.3 % — HIGH (ref 10.3–14.5)
WBC # BLD: 4.18 K/UL — SIGNIFICANT CHANGE UP (ref 3.8–10.5)
WBC # BLD: 4.18 K/UL — SIGNIFICANT CHANGE UP (ref 3.8–10.5)
WBC # FLD AUTO: 4.18 K/UL — SIGNIFICANT CHANGE UP (ref 3.8–10.5)
WBC # FLD AUTO: 4.18 K/UL — SIGNIFICANT CHANGE UP (ref 3.8–10.5)

## 2023-11-13 DIAGNOSIS — R11.2 NAUSEA WITH VOMITING, UNSPECIFIED: ICD-10-CM

## 2023-11-13 DIAGNOSIS — C20 MALIGNANT NEOPLASM OF RECTUM: ICD-10-CM

## 2023-11-13 DIAGNOSIS — Z51.11 ENCOUNTER FOR ANTINEOPLASTIC CHEMOTHERAPY: ICD-10-CM

## 2023-11-24 ENCOUNTER — RESULT REVIEW (OUTPATIENT)
Age: 68
End: 2023-11-24

## 2023-11-24 ENCOUNTER — APPOINTMENT (OUTPATIENT)
Dept: INFUSION THERAPY | Facility: HOSPITAL | Age: 68
End: 2023-11-24

## 2023-11-24 ENCOUNTER — APPOINTMENT (OUTPATIENT)
Dept: HEMATOLOGY ONCOLOGY | Facility: CLINIC | Age: 68
End: 2023-11-24

## 2023-11-24 LAB
ANISOCYTOSIS BLD QL: SLIGHT — SIGNIFICANT CHANGE UP
ANISOCYTOSIS BLD QL: SLIGHT — SIGNIFICANT CHANGE UP
BASOPHILS # BLD AUTO: 0 K/UL — SIGNIFICANT CHANGE UP (ref 0–0.2)
BASOPHILS # BLD AUTO: 0 K/UL — SIGNIFICANT CHANGE UP (ref 0–0.2)
BASOPHILS NFR BLD AUTO: 0 % — SIGNIFICANT CHANGE UP (ref 0–2)
BASOPHILS NFR BLD AUTO: 0 % — SIGNIFICANT CHANGE UP (ref 0–2)
DACRYOCYTES BLD QL SMEAR: SLIGHT — SIGNIFICANT CHANGE UP
DACRYOCYTES BLD QL SMEAR: SLIGHT — SIGNIFICANT CHANGE UP
EOSINOPHIL # BLD AUTO: 0.04 K/UL — SIGNIFICANT CHANGE UP (ref 0–0.5)
EOSINOPHIL # BLD AUTO: 0.04 K/UL — SIGNIFICANT CHANGE UP (ref 0–0.5)
EOSINOPHIL NFR BLD AUTO: 1 % — SIGNIFICANT CHANGE UP (ref 0–6)
EOSINOPHIL NFR BLD AUTO: 1 % — SIGNIFICANT CHANGE UP (ref 0–6)
HCT VFR BLD CALC: 39.5 % — SIGNIFICANT CHANGE UP (ref 39–50)
HCT VFR BLD CALC: 39.5 % — SIGNIFICANT CHANGE UP (ref 39–50)
HGB BLD-MCNC: 13.9 G/DL — SIGNIFICANT CHANGE UP (ref 13–17)
HGB BLD-MCNC: 13.9 G/DL — SIGNIFICANT CHANGE UP (ref 13–17)
LYMPHOCYTES # BLD AUTO: 0.37 K/UL — LOW (ref 1–3.3)
LYMPHOCYTES # BLD AUTO: 0.37 K/UL — LOW (ref 1–3.3)
LYMPHOCYTES # BLD AUTO: 9 % — LOW (ref 13–44)
LYMPHOCYTES # BLD AUTO: 9 % — LOW (ref 13–44)
MACROCYTES BLD QL: SLIGHT — SIGNIFICANT CHANGE UP
MACROCYTES BLD QL: SLIGHT — SIGNIFICANT CHANGE UP
MCHC RBC-ENTMCNC: 35.2 G/DL — SIGNIFICANT CHANGE UP (ref 32–36)
MCHC RBC-ENTMCNC: 35.2 G/DL — SIGNIFICANT CHANGE UP (ref 32–36)
MCHC RBC-ENTMCNC: 36.5 PG — HIGH (ref 27–34)
MCHC RBC-ENTMCNC: 36.5 PG — HIGH (ref 27–34)
MCV RBC AUTO: 103.7 FL — HIGH (ref 80–100)
MCV RBC AUTO: 103.7 FL — HIGH (ref 80–100)
MONOCYTES # BLD AUTO: 1 K/UL — HIGH (ref 0–0.9)
MONOCYTES # BLD AUTO: 1 K/UL — HIGH (ref 0–0.9)
MONOCYTES NFR BLD AUTO: 24 % — HIGH (ref 2–14)
MONOCYTES NFR BLD AUTO: 24 % — HIGH (ref 2–14)
NEUTROPHILS # BLD AUTO: 2.75 K/UL — SIGNIFICANT CHANGE UP (ref 1.8–7.4)
NEUTROPHILS # BLD AUTO: 2.75 K/UL — SIGNIFICANT CHANGE UP (ref 1.8–7.4)
NEUTROPHILS NFR BLD AUTO: 66 % — SIGNIFICANT CHANGE UP (ref 43–77)
NEUTROPHILS NFR BLD AUTO: 66 % — SIGNIFICANT CHANGE UP (ref 43–77)
NRBC # BLD: 0 /100 — SIGNIFICANT CHANGE UP (ref 0–0)
NRBC # BLD: 0 /100 — SIGNIFICANT CHANGE UP (ref 0–0)
NRBC # BLD: SIGNIFICANT CHANGE UP /100 WBCS (ref 0–0)
NRBC # BLD: SIGNIFICANT CHANGE UP /100 WBCS (ref 0–0)
PLAT MORPH BLD: NORMAL — SIGNIFICANT CHANGE UP
PLAT MORPH BLD: NORMAL — SIGNIFICANT CHANGE UP
PLATELET # BLD AUTO: 143 K/UL — LOW (ref 150–400)
PLATELET # BLD AUTO: 143 K/UL — LOW (ref 150–400)
POIKILOCYTOSIS BLD QL AUTO: SLIGHT — SIGNIFICANT CHANGE UP
POIKILOCYTOSIS BLD QL AUTO: SLIGHT — SIGNIFICANT CHANGE UP
RBC # BLD: 3.81 M/UL — LOW (ref 4.2–5.8)
RBC # BLD: 3.81 M/UL — LOW (ref 4.2–5.8)
RBC # FLD: 16.7 % — HIGH (ref 10.3–14.5)
RBC # FLD: 16.7 % — HIGH (ref 10.3–14.5)
RBC BLD AUTO: ABNORMAL
RBC BLD AUTO: ABNORMAL
WBC # BLD: 4.16 K/UL — SIGNIFICANT CHANGE UP (ref 3.8–10.5)
WBC # BLD: 4.16 K/UL — SIGNIFICANT CHANGE UP (ref 3.8–10.5)
WBC # FLD AUTO: 4.16 K/UL — SIGNIFICANT CHANGE UP (ref 3.8–10.5)
WBC # FLD AUTO: 4.16 K/UL — SIGNIFICANT CHANGE UP (ref 3.8–10.5)

## 2023-12-08 ENCOUNTER — NON-APPOINTMENT (OUTPATIENT)
Age: 68
End: 2023-12-08

## 2023-12-08 ENCOUNTER — APPOINTMENT (OUTPATIENT)
Dept: HEMATOLOGY ONCOLOGY | Facility: CLINIC | Age: 68
End: 2023-12-08

## 2023-12-08 ENCOUNTER — APPOINTMENT (OUTPATIENT)
Dept: INFUSION THERAPY | Facility: HOSPITAL | Age: 68
End: 2023-12-08

## 2023-12-22 ENCOUNTER — APPOINTMENT (OUTPATIENT)
Dept: HEMATOLOGY ONCOLOGY | Facility: CLINIC | Age: 68
End: 2023-12-22

## 2023-12-22 ENCOUNTER — APPOINTMENT (OUTPATIENT)
Dept: INFUSION THERAPY | Facility: HOSPITAL | Age: 68
End: 2023-12-22

## 2024-01-05 ENCOUNTER — RESULT REVIEW (OUTPATIENT)
Age: 69
End: 2024-01-05

## 2024-01-05 ENCOUNTER — APPOINTMENT (OUTPATIENT)
Dept: HEMATOLOGY ONCOLOGY | Facility: CLINIC | Age: 69
End: 2024-01-05

## 2024-01-05 ENCOUNTER — APPOINTMENT (OUTPATIENT)
Dept: INFUSION THERAPY | Facility: HOSPITAL | Age: 69
End: 2024-01-05

## 2024-01-05 LAB
BASOPHILS # BLD AUTO: 0.03 K/UL — SIGNIFICANT CHANGE UP (ref 0–0.2)
BASOPHILS # BLD AUTO: 0.03 K/UL — SIGNIFICANT CHANGE UP (ref 0–0.2)
BASOPHILS NFR BLD AUTO: 0.5 % — SIGNIFICANT CHANGE UP (ref 0–2)
BASOPHILS NFR BLD AUTO: 0.5 % — SIGNIFICANT CHANGE UP (ref 0–2)
EOSINOPHIL # BLD AUTO: 0.06 K/UL — SIGNIFICANT CHANGE UP (ref 0–0.5)
EOSINOPHIL # BLD AUTO: 0.06 K/UL — SIGNIFICANT CHANGE UP (ref 0–0.5)
EOSINOPHIL NFR BLD AUTO: 1.1 % — SIGNIFICANT CHANGE UP (ref 0–6)
EOSINOPHIL NFR BLD AUTO: 1.1 % — SIGNIFICANT CHANGE UP (ref 0–6)
HCT VFR BLD CALC: 35.8 % — LOW (ref 39–50)
HCT VFR BLD CALC: 35.8 % — LOW (ref 39–50)
HGB BLD-MCNC: 12.9 G/DL — LOW (ref 13–17)
HGB BLD-MCNC: 12.9 G/DL — LOW (ref 13–17)
IMM GRANULOCYTES NFR BLD AUTO: 0.5 % — SIGNIFICANT CHANGE UP (ref 0–0.9)
IMM GRANULOCYTES NFR BLD AUTO: 0.5 % — SIGNIFICANT CHANGE UP (ref 0–0.9)
LYMPHOCYTES # BLD AUTO: 0.66 K/UL — LOW (ref 1–3.3)
LYMPHOCYTES # BLD AUTO: 0.66 K/UL — LOW (ref 1–3.3)
LYMPHOCYTES # BLD AUTO: 12 % — LOW (ref 13–44)
LYMPHOCYTES # BLD AUTO: 12 % — LOW (ref 13–44)
MCHC RBC-ENTMCNC: 36 G/DL — SIGNIFICANT CHANGE UP (ref 32–36)
MCHC RBC-ENTMCNC: 36 G/DL — SIGNIFICANT CHANGE UP (ref 32–36)
MCHC RBC-ENTMCNC: 37.3 PG — HIGH (ref 27–34)
MCHC RBC-ENTMCNC: 37.3 PG — HIGH (ref 27–34)
MCV RBC AUTO: 103.5 FL — HIGH (ref 80–100)
MCV RBC AUTO: 103.5 FL — HIGH (ref 80–100)
MONOCYTES # BLD AUTO: 0.59 K/UL — SIGNIFICANT CHANGE UP (ref 0–0.9)
MONOCYTES # BLD AUTO: 0.59 K/UL — SIGNIFICANT CHANGE UP (ref 0–0.9)
MONOCYTES NFR BLD AUTO: 10.7 % — SIGNIFICANT CHANGE UP (ref 2–14)
MONOCYTES NFR BLD AUTO: 10.7 % — SIGNIFICANT CHANGE UP (ref 2–14)
NEUTROPHILS # BLD AUTO: 4.14 K/UL — SIGNIFICANT CHANGE UP (ref 1.8–7.4)
NEUTROPHILS # BLD AUTO: 4.14 K/UL — SIGNIFICANT CHANGE UP (ref 1.8–7.4)
NEUTROPHILS NFR BLD AUTO: 75.2 % — SIGNIFICANT CHANGE UP (ref 43–77)
NEUTROPHILS NFR BLD AUTO: 75.2 % — SIGNIFICANT CHANGE UP (ref 43–77)
NRBC # BLD: 0 /100 WBCS — SIGNIFICANT CHANGE UP (ref 0–0)
NRBC # BLD: 0 /100 WBCS — SIGNIFICANT CHANGE UP (ref 0–0)
PLATELET # BLD AUTO: 145 K/UL — LOW (ref 150–400)
PLATELET # BLD AUTO: 145 K/UL — LOW (ref 150–400)
RBC # BLD: 3.46 M/UL — LOW (ref 4.2–5.8)
RBC # BLD: 3.46 M/UL — LOW (ref 4.2–5.8)
RBC # FLD: 18.1 % — HIGH (ref 10.3–14.5)
RBC # FLD: 18.1 % — HIGH (ref 10.3–14.5)
WBC # BLD: 5.51 K/UL — SIGNIFICANT CHANGE UP (ref 3.8–10.5)
WBC # BLD: 5.51 K/UL — SIGNIFICANT CHANGE UP (ref 3.8–10.5)
WBC # FLD AUTO: 5.51 K/UL — SIGNIFICANT CHANGE UP (ref 3.8–10.5)
WBC # FLD AUTO: 5.51 K/UL — SIGNIFICANT CHANGE UP (ref 3.8–10.5)

## 2024-01-11 ENCOUNTER — OUTPATIENT (OUTPATIENT)
Dept: OUTPATIENT SERVICES | Facility: HOSPITAL | Age: 69
LOS: 1 days | Discharge: HOME | End: 2024-01-11

## 2024-01-11 DIAGNOSIS — D64.9 ANEMIA, UNSPECIFIED: ICD-10-CM

## 2024-01-19 ENCOUNTER — RESULT REVIEW (OUTPATIENT)
Age: 69
End: 2024-01-19

## 2024-01-19 ENCOUNTER — APPOINTMENT (OUTPATIENT)
Dept: HEMATOLOGY ONCOLOGY | Facility: CLINIC | Age: 69
End: 2024-01-19

## 2024-01-19 ENCOUNTER — APPOINTMENT (OUTPATIENT)
Dept: INFUSION THERAPY | Facility: HOSPITAL | Age: 69
End: 2024-01-19

## 2024-01-19 DIAGNOSIS — Z00.00 ENCOUNTER FOR GENERAL ADULT MEDICAL EXAMINATION W/OUT ABNORMAL FINDINGS: ICD-10-CM

## 2024-01-19 LAB
ALBUMIN SERPL ELPH-MCNC: 3.3 G/DL
ALP BLD-CCNC: 137 U/L
ALT SERPL-CCNC: 6 U/L
ANION GAP SERPL CALC-SCNC: 11 MMOL/L
AST SERPL-CCNC: 28 U/L
BASOPHILS # BLD AUTO: 0.03 K/UL — SIGNIFICANT CHANGE UP (ref 0–0.2)
BASOPHILS NFR BLD AUTO: 0.5 % — SIGNIFICANT CHANGE UP (ref 0–2)
BILIRUB SERPL-MCNC: 0.8 MG/DL
BUN SERPL-MCNC: 12 MG/DL
CALCIUM SERPL-MCNC: 8.8 MG/DL
CHLORIDE SERPL-SCNC: 105 MMOL/L
CO2 SERPL-SCNC: 24 MMOL/L
CREAT SERPL-MCNC: 0.74 MG/DL
EGFR: 98 ML/MIN/1.73M2
EOSINOPHIL # BLD AUTO: 0.07 K/UL — SIGNIFICANT CHANGE UP (ref 0–0.5)
EOSINOPHIL NFR BLD AUTO: 1.1 % — SIGNIFICANT CHANGE UP (ref 0–6)
GLUCOSE SERPL-MCNC: 144 MG/DL
HCT VFR BLD CALC: 36.2 % — LOW (ref 39–50)
HGB BLD-MCNC: 12.5 G/DL — LOW (ref 13–17)
IMM GRANULOCYTES NFR BLD AUTO: 0.3 % — SIGNIFICANT CHANGE UP (ref 0–0.9)
LYMPHOCYTES # BLD AUTO: 0.44 K/UL — LOW (ref 1–3.3)
LYMPHOCYTES # BLD AUTO: 7 % — LOW (ref 13–44)
MCHC RBC-ENTMCNC: 34.5 G/DL — SIGNIFICANT CHANGE UP (ref 32–36)
MCHC RBC-ENTMCNC: 36.9 PG — HIGH (ref 27–34)
MCV RBC AUTO: 106.8 FL — HIGH (ref 80–100)
MONOCYTES # BLD AUTO: 0.77 K/UL — SIGNIFICANT CHANGE UP (ref 0–0.9)
MONOCYTES NFR BLD AUTO: 12.3 % — SIGNIFICANT CHANGE UP (ref 2–14)
NEUTROPHILS # BLD AUTO: 4.95 K/UL — SIGNIFICANT CHANGE UP (ref 1.8–7.4)
NEUTROPHILS NFR BLD AUTO: 78.8 % — HIGH (ref 43–77)
NRBC # BLD: 0 /100 WBCS — SIGNIFICANT CHANGE UP (ref 0–0)
PLATELET # BLD AUTO: 121 K/UL — LOW (ref 150–400)
POTASSIUM SERPL-SCNC: 4 MMOL/L
PROT SERPL-MCNC: 6.5 G/DL
RBC # BLD: 3.39 M/UL — LOW (ref 4.2–5.8)
RBC # FLD: 17.5 % — HIGH (ref 10.3–14.5)
SODIUM SERPL-SCNC: 140 MMOL/L
WBC # BLD: 6.28 K/UL — SIGNIFICANT CHANGE UP (ref 3.8–10.5)
WBC # FLD AUTO: 6.28 K/UL — SIGNIFICANT CHANGE UP (ref 3.8–10.5)

## 2024-01-21 DIAGNOSIS — Z51.11 ENCOUNTER FOR ANTINEOPLASTIC CHEMOTHERAPY: ICD-10-CM

## 2024-01-21 DIAGNOSIS — C20 MALIGNANT NEOPLASM OF RECTUM: ICD-10-CM

## 2024-01-21 DIAGNOSIS — R11.2 NAUSEA WITH VOMITING, UNSPECIFIED: ICD-10-CM

## 2024-02-01 ENCOUNTER — NON-APPOINTMENT (OUTPATIENT)
Age: 69
End: 2024-02-01

## 2024-02-02 ENCOUNTER — RESULT REVIEW (OUTPATIENT)
Age: 69
End: 2024-02-02

## 2024-02-02 ENCOUNTER — APPOINTMENT (OUTPATIENT)
Dept: HEMATOLOGY ONCOLOGY | Facility: CLINIC | Age: 69
End: 2024-02-02

## 2024-02-02 ENCOUNTER — APPOINTMENT (OUTPATIENT)
Dept: INFUSION THERAPY | Facility: HOSPITAL | Age: 69
End: 2024-02-02

## 2024-02-02 LAB
BASOPHILS # BLD AUTO: 0.04 K/UL — SIGNIFICANT CHANGE UP (ref 0–0.2)
BASOPHILS NFR BLD AUTO: 0.7 % — SIGNIFICANT CHANGE UP (ref 0–2)
EOSINOPHIL # BLD AUTO: 0.17 K/UL — SIGNIFICANT CHANGE UP (ref 0–0.5)
EOSINOPHIL NFR BLD AUTO: 3.1 % — SIGNIFICANT CHANGE UP (ref 0–6)
HCT VFR BLD CALC: 34.8 % — LOW (ref 39–50)
HGB BLD-MCNC: 12.2 G/DL — LOW (ref 13–17)
IMM GRANULOCYTES NFR BLD AUTO: 0.4 % — SIGNIFICANT CHANGE UP (ref 0–0.9)
LYMPHOCYTES # BLD AUTO: 0.58 K/UL — LOW (ref 1–3.3)
LYMPHOCYTES # BLD AUTO: 10.6 % — LOW (ref 13–44)
MCHC RBC-ENTMCNC: 35.1 G/DL — SIGNIFICANT CHANGE UP (ref 32–36)
MCHC RBC-ENTMCNC: 37.7 PG — HIGH (ref 27–34)
MCV RBC AUTO: 107.4 FL — HIGH (ref 80–100)
MONOCYTES # BLD AUTO: 1 K/UL — HIGH (ref 0–0.9)
MONOCYTES NFR BLD AUTO: 18.3 % — HIGH (ref 2–14)
NEUTROPHILS # BLD AUTO: 3.65 K/UL — SIGNIFICANT CHANGE UP (ref 1.8–7.4)
NEUTROPHILS NFR BLD AUTO: 66.9 % — SIGNIFICANT CHANGE UP (ref 43–77)
NRBC # BLD: 0 /100 WBCS — SIGNIFICANT CHANGE UP (ref 0–0)
PLATELET # BLD AUTO: 146 K/UL — LOW (ref 150–400)
RBC # BLD: 3.24 M/UL — LOW (ref 4.2–5.8)
RBC # FLD: 17.5 % — HIGH (ref 10.3–14.5)
WBC # BLD: 5.46 K/UL — SIGNIFICANT CHANGE UP (ref 3.8–10.5)
WBC # FLD AUTO: 5.46 K/UL — SIGNIFICANT CHANGE UP (ref 3.8–10.5)

## 2024-02-15 ENCOUNTER — NON-APPOINTMENT (OUTPATIENT)
Age: 69
End: 2024-02-15

## 2024-02-16 ENCOUNTER — APPOINTMENT (OUTPATIENT)
Dept: INFUSION THERAPY | Facility: HOSPITAL | Age: 69
End: 2024-02-16

## 2024-02-16 ENCOUNTER — RESULT REVIEW (OUTPATIENT)
Age: 69
End: 2024-02-16

## 2024-02-16 ENCOUNTER — APPOINTMENT (OUTPATIENT)
Dept: HEMATOLOGY ONCOLOGY | Facility: CLINIC | Age: 69
End: 2024-02-16

## 2024-02-16 LAB
BASOPHILS # BLD AUTO: 0.05 K/UL — SIGNIFICANT CHANGE UP (ref 0–0.2)
BASOPHILS NFR BLD AUTO: 0.9 % — SIGNIFICANT CHANGE UP (ref 0–2)
EOSINOPHIL # BLD AUTO: 0.16 K/UL — SIGNIFICANT CHANGE UP (ref 0–0.5)
EOSINOPHIL NFR BLD AUTO: 2.8 % — SIGNIFICANT CHANGE UP (ref 0–6)
HCT VFR BLD CALC: 35.1 % — LOW (ref 39–50)
HGB BLD-MCNC: 12.6 G/DL — LOW (ref 13–17)
IMM GRANULOCYTES NFR BLD AUTO: 1 % — HIGH (ref 0–0.9)
LYMPHOCYTES # BLD AUTO: 0.99 K/UL — LOW (ref 1–3.3)
LYMPHOCYTES # BLD AUTO: 17 % — SIGNIFICANT CHANGE UP (ref 13–44)
MCHC RBC-ENTMCNC: 35.9 G/DL — SIGNIFICANT CHANGE UP (ref 32–36)
MCHC RBC-ENTMCNC: 39 PG — HIGH (ref 27–34)
MCV RBC AUTO: 108.7 FL — HIGH (ref 80–100)
MONOCYTES # BLD AUTO: 1.06 K/UL — HIGH (ref 0–0.9)
MONOCYTES NFR BLD AUTO: 18.2 % — HIGH (ref 2–14)
NEUTROPHILS # BLD AUTO: 3.49 K/UL — SIGNIFICANT CHANGE UP (ref 1.8–7.4)
NEUTROPHILS NFR BLD AUTO: 60.1 % — SIGNIFICANT CHANGE UP (ref 43–77)
NRBC # BLD: 0 /100 WBCS — SIGNIFICANT CHANGE UP (ref 0–0)
PLATELET # BLD AUTO: 120 K/UL — LOW (ref 150–400)
RBC # BLD: 3.23 M/UL — LOW (ref 4.2–5.8)
RBC # FLD: 17.6 % — HIGH (ref 10.3–14.5)
WBC # BLD: 5.81 K/UL — SIGNIFICANT CHANGE UP (ref 3.8–10.5)
WBC # FLD AUTO: 5.81 K/UL — SIGNIFICANT CHANGE UP (ref 3.8–10.5)

## 2024-03-01 ENCOUNTER — APPOINTMENT (OUTPATIENT)
Dept: INFUSION THERAPY | Facility: HOSPITAL | Age: 69
End: 2024-03-01

## 2024-03-01 ENCOUNTER — RESULT REVIEW (OUTPATIENT)
Age: 69
End: 2024-03-01

## 2024-03-01 ENCOUNTER — APPOINTMENT (OUTPATIENT)
Dept: HEMATOLOGY ONCOLOGY | Facility: CLINIC | Age: 69
End: 2024-03-01

## 2024-03-01 LAB
BASOPHILS # BLD AUTO: 0.03 K/UL — SIGNIFICANT CHANGE UP (ref 0–0.2)
BASOPHILS NFR BLD AUTO: 0.4 % — SIGNIFICANT CHANGE UP (ref 0–2)
EOSINOPHIL # BLD AUTO: 0.09 K/UL — SIGNIFICANT CHANGE UP (ref 0–0.5)
EOSINOPHIL NFR BLD AUTO: 1.2 % — SIGNIFICANT CHANGE UP (ref 0–6)
HCT VFR BLD CALC: 34.2 % — LOW (ref 39–50)
HGB BLD-MCNC: 12.2 G/DL — LOW (ref 13–17)
IMM GRANULOCYTES NFR BLD AUTO: 0.5 % — SIGNIFICANT CHANGE UP (ref 0–0.9)
LYMPHOCYTES # BLD AUTO: 0.68 K/UL — LOW (ref 1–3.3)
LYMPHOCYTES # BLD AUTO: 9 % — LOW (ref 13–44)
MCHC RBC-ENTMCNC: 35.7 G/DL — SIGNIFICANT CHANGE UP (ref 32–36)
MCHC RBC-ENTMCNC: 38.4 PG — HIGH (ref 27–34)
MCV RBC AUTO: 107.5 FL — HIGH (ref 80–100)
MONOCYTES # BLD AUTO: 1.83 K/UL — HIGH (ref 0–0.9)
MONOCYTES NFR BLD AUTO: 24.2 % — HIGH (ref 2–14)
NEUTROPHILS # BLD AUTO: 4.9 K/UL — SIGNIFICANT CHANGE UP (ref 1.8–7.4)
NEUTROPHILS NFR BLD AUTO: 64.7 % — SIGNIFICANT CHANGE UP (ref 43–77)
NRBC # BLD: 0 /100 WBCS — SIGNIFICANT CHANGE UP (ref 0–0)
PLATELET # BLD AUTO: 143 K/UL — LOW (ref 150–400)
RBC # BLD: 3.18 M/UL — LOW (ref 4.2–5.8)
RBC # FLD: 17.7 % — HIGH (ref 10.3–14.5)
WBC # BLD: 7.57 K/UL — SIGNIFICANT CHANGE UP (ref 3.8–10.5)
WBC # FLD AUTO: 7.57 K/UL — SIGNIFICANT CHANGE UP (ref 3.8–10.5)

## 2024-03-07 ENCOUNTER — OUTPATIENT (OUTPATIENT)
Dept: OUTPATIENT SERVICES | Facility: HOSPITAL | Age: 69
LOS: 1 days | Discharge: HOME | End: 2024-03-07

## 2024-03-07 DIAGNOSIS — D64.9 ANEMIA, UNSPECIFIED: ICD-10-CM

## 2024-03-15 ENCOUNTER — APPOINTMENT (OUTPATIENT)
Dept: INFUSION THERAPY | Facility: HOSPITAL | Age: 69
End: 2024-03-15

## 2024-03-15 ENCOUNTER — RESULT REVIEW (OUTPATIENT)
Age: 69
End: 2024-03-15

## 2024-03-15 ENCOUNTER — APPOINTMENT (OUTPATIENT)
Dept: HEMATOLOGY ONCOLOGY | Facility: CLINIC | Age: 69
End: 2024-03-15

## 2024-03-15 LAB
BASOPHILS # BLD AUTO: 0.02 K/UL — SIGNIFICANT CHANGE UP (ref 0–0.2)
BASOPHILS NFR BLD AUTO: 0.4 % — SIGNIFICANT CHANGE UP (ref 0–2)
EOSINOPHIL # BLD AUTO: 0.08 K/UL — SIGNIFICANT CHANGE UP (ref 0–0.5)
EOSINOPHIL NFR BLD AUTO: 1.6 % — SIGNIFICANT CHANGE UP (ref 0–6)
HCT VFR BLD CALC: 33.7 % — LOW (ref 39–50)
HGB BLD-MCNC: 12.4 G/DL — LOW (ref 13–17)
IMM GRANULOCYTES NFR BLD AUTO: 0.4 % — SIGNIFICANT CHANGE UP (ref 0–0.9)
LYMPHOCYTES # BLD AUTO: 0.78 K/UL — LOW (ref 1–3.3)
LYMPHOCYTES # BLD AUTO: 15.4 % — SIGNIFICANT CHANGE UP (ref 13–44)
MCHC RBC-ENTMCNC: 36.8 G/DL — HIGH (ref 32–36)
MCHC RBC-ENTMCNC: 40.3 PG — HIGH (ref 27–34)
MCV RBC AUTO: 109.4 FL — HIGH (ref 80–100)
MONOCYTES # BLD AUTO: 1.04 K/UL — HIGH (ref 0–0.9)
MONOCYTES NFR BLD AUTO: 20.5 % — HIGH (ref 2–14)
NEUTROPHILS # BLD AUTO: 3.13 K/UL — SIGNIFICANT CHANGE UP (ref 1.8–7.4)
NEUTROPHILS NFR BLD AUTO: 61.7 % — SIGNIFICANT CHANGE UP (ref 43–77)
NRBC # BLD: 0 /100 WBCS — SIGNIFICANT CHANGE UP (ref 0–0)
PLATELET # BLD AUTO: 149 K/UL — LOW (ref 150–400)
RBC # BLD: 3.08 M/UL — LOW (ref 4.2–5.8)
RBC # FLD: 17.6 % — HIGH (ref 10.3–14.5)
WBC # BLD: 5.07 K/UL — SIGNIFICANT CHANGE UP (ref 3.8–10.5)
WBC # FLD AUTO: 5.07 K/UL — SIGNIFICANT CHANGE UP (ref 3.8–10.5)

## 2024-03-18 DIAGNOSIS — C20 MALIGNANT NEOPLASM OF RECTUM: ICD-10-CM

## 2024-03-18 DIAGNOSIS — R11.2 NAUSEA WITH VOMITING, UNSPECIFIED: ICD-10-CM

## 2024-03-18 DIAGNOSIS — Z51.11 ENCOUNTER FOR ANTINEOPLASTIC CHEMOTHERAPY: ICD-10-CM

## 2024-03-29 ENCOUNTER — APPOINTMENT (OUTPATIENT)
Dept: INFUSION THERAPY | Facility: HOSPITAL | Age: 69
End: 2024-03-29

## 2024-03-29 ENCOUNTER — RESULT REVIEW (OUTPATIENT)
Age: 69
End: 2024-03-29

## 2024-03-29 ENCOUNTER — APPOINTMENT (OUTPATIENT)
Dept: HEMATOLOGY ONCOLOGY | Facility: CLINIC | Age: 69
End: 2024-03-29

## 2024-03-29 ENCOUNTER — NON-APPOINTMENT (OUTPATIENT)
Age: 69
End: 2024-03-29

## 2024-03-29 LAB
BASOPHILS # BLD AUTO: 0.03 K/UL — SIGNIFICANT CHANGE UP (ref 0–0.2)
BASOPHILS NFR BLD AUTO: 0.7 % — SIGNIFICANT CHANGE UP (ref 0–2)
EOSINOPHIL # BLD AUTO: 0.08 K/UL — SIGNIFICANT CHANGE UP (ref 0–0.5)
EOSINOPHIL NFR BLD AUTO: 1.9 % — SIGNIFICANT CHANGE UP (ref 0–6)
HCT VFR BLD CALC: 38.4 % — LOW (ref 39–50)
HGB BLD-MCNC: 13.8 G/DL — SIGNIFICANT CHANGE UP (ref 13–17)
IMM GRANULOCYTES NFR BLD AUTO: 0.5 % — SIGNIFICANT CHANGE UP (ref 0–0.9)
LYMPHOCYTES # BLD AUTO: 0.8 K/UL — LOW (ref 1–3.3)
LYMPHOCYTES # BLD AUTO: 19.3 % — SIGNIFICANT CHANGE UP (ref 13–44)
MCHC RBC-ENTMCNC: 35.9 G/DL — SIGNIFICANT CHANGE UP (ref 32–36)
MCHC RBC-ENTMCNC: 40.1 PG — HIGH (ref 27–34)
MCV RBC AUTO: 111.6 FL — HIGH (ref 80–100)
MONOCYTES # BLD AUTO: 1.01 K/UL — HIGH (ref 0–0.9)
MONOCYTES NFR BLD AUTO: 24.4 % — HIGH (ref 2–14)
NEUTROPHILS # BLD AUTO: 2.2 K/UL — SIGNIFICANT CHANGE UP (ref 1.8–7.4)
NEUTROPHILS NFR BLD AUTO: 53.2 % — SIGNIFICANT CHANGE UP (ref 43–77)
NRBC # BLD: 0 /100 WBCS — SIGNIFICANT CHANGE UP (ref 0–0)
PLATELET # BLD AUTO: 126 K/UL — LOW (ref 150–400)
RBC # BLD: 3.44 M/UL — LOW (ref 4.2–5.8)
RBC # FLD: 17.5 % — HIGH (ref 10.3–14.5)
WBC # BLD: 4.14 K/UL — SIGNIFICANT CHANGE UP (ref 3.8–10.5)
WBC # FLD AUTO: 4.14 K/UL — SIGNIFICANT CHANGE UP (ref 3.8–10.5)

## 2024-04-12 ENCOUNTER — RESULT REVIEW (OUTPATIENT)
Age: 69
End: 2024-04-12

## 2024-04-12 ENCOUNTER — APPOINTMENT (OUTPATIENT)
Dept: INFUSION THERAPY | Facility: HOSPITAL | Age: 69
End: 2024-04-12

## 2024-04-12 ENCOUNTER — APPOINTMENT (OUTPATIENT)
Dept: HEMATOLOGY ONCOLOGY | Facility: CLINIC | Age: 69
End: 2024-04-12

## 2024-04-12 LAB
BASOPHILS # BLD AUTO: 0.02 K/UL — SIGNIFICANT CHANGE UP (ref 0–0.2)
BASOPHILS NFR BLD AUTO: 0.4 % — SIGNIFICANT CHANGE UP (ref 0–2)
EOSINOPHIL # BLD AUTO: 0.05 K/UL — SIGNIFICANT CHANGE UP (ref 0–0.5)
EOSINOPHIL NFR BLD AUTO: 1 % — SIGNIFICANT CHANGE UP (ref 0–6)
HCT VFR BLD CALC: 34.9 % — LOW (ref 39–50)
HGB BLD-MCNC: 12.8 G/DL — LOW (ref 13–17)
IMM GRANULOCYTES NFR BLD AUTO: 0.4 % — SIGNIFICANT CHANGE UP (ref 0–0.9)
LYMPHOCYTES # BLD AUTO: 0.79 K/UL — LOW (ref 1–3.3)
LYMPHOCYTES # BLD AUTO: 15.4 % — SIGNIFICANT CHANGE UP (ref 13–44)
MCHC RBC-ENTMCNC: 36.7 G/DL — HIGH (ref 32–36)
MCHC RBC-ENTMCNC: 39.9 PG — HIGH (ref 27–34)
MCV RBC AUTO: 108.7 FL — HIGH (ref 80–100)
MONOCYTES # BLD AUTO: 1.05 K/UL — HIGH (ref 0–0.9)
MONOCYTES NFR BLD AUTO: 20.4 % — HIGH (ref 2–14)
NEUTROPHILS # BLD AUTO: 3.21 K/UL — SIGNIFICANT CHANGE UP (ref 1.8–7.4)
NEUTROPHILS NFR BLD AUTO: 62.4 % — SIGNIFICANT CHANGE UP (ref 43–77)
NRBC # BLD: 0 /100 WBCS — SIGNIFICANT CHANGE UP (ref 0–0)
PLATELET # BLD AUTO: 120 K/UL — LOW (ref 150–400)
RBC # BLD: 3.21 M/UL — LOW (ref 4.2–5.8)
RBC # FLD: 16.6 % — HIGH (ref 10.3–14.5)
WBC # BLD: 5.14 K/UL — SIGNIFICANT CHANGE UP (ref 3.8–10.5)
WBC # FLD AUTO: 5.14 K/UL — SIGNIFICANT CHANGE UP (ref 3.8–10.5)

## 2024-04-26 ENCOUNTER — RESULT REVIEW (OUTPATIENT)
Age: 69
End: 2024-04-26

## 2024-04-26 ENCOUNTER — APPOINTMENT (OUTPATIENT)
Dept: INFUSION THERAPY | Facility: HOSPITAL | Age: 69
End: 2024-04-26

## 2024-04-26 LAB
AGGLUTINATION: PRESENT — SIGNIFICANT CHANGE UP
BASOPHILS # BLD AUTO: 0.03 K/UL — SIGNIFICANT CHANGE UP (ref 0–0.2)
BASOPHILS NFR BLD AUTO: 0.5 % — SIGNIFICANT CHANGE UP (ref 0–2)
EOSINOPHIL # BLD AUTO: 0.04 K/UL — SIGNIFICANT CHANGE UP (ref 0–0.5)
EOSINOPHIL NFR BLD AUTO: 0.6 % — SIGNIFICANT CHANGE UP (ref 0–6)
HCT VFR BLD CALC: 37 % — LOW (ref 39–50)
HGB BLD-MCNC: 13.6 G/DL — SIGNIFICANT CHANGE UP (ref 13–17)
IMM GRANULOCYTES NFR BLD AUTO: 0.6 % — SIGNIFICANT CHANGE UP (ref 0–0.9)
LYMPHOCYTES # BLD AUTO: 0.68 K/UL — LOW (ref 1–3.3)
LYMPHOCYTES # BLD AUTO: 10.9 % — LOW (ref 13–44)
MCHC RBC-ENTMCNC: 36.8 G/DL — HIGH (ref 32–36)
MCHC RBC-ENTMCNC: 40 PG — HIGH (ref 27–34)
MCV RBC AUTO: 108.8 FL — HIGH (ref 80–100)
MONOCYTES # BLD AUTO: 1.2 K/UL — HIGH (ref 0–0.9)
MONOCYTES NFR BLD AUTO: 19.2 % — HIGH (ref 2–14)
NEUTROPHILS # BLD AUTO: 4.25 K/UL — SIGNIFICANT CHANGE UP (ref 1.8–7.4)
NEUTROPHILS NFR BLD AUTO: 68.2 % — SIGNIFICANT CHANGE UP (ref 43–77)
NRBC # BLD: 0 /100 WBCS — SIGNIFICANT CHANGE UP (ref 0–0)
PLAT MORPH BLD: NORMAL — SIGNIFICANT CHANGE UP
PLATELET # BLD AUTO: 117 K/UL — LOW (ref 150–400)
RBC # BLD: 3.4 M/UL — LOW (ref 4.2–5.8)
RBC # FLD: 16.5 % — HIGH (ref 10.3–14.5)
RBC BLD AUTO: ABNORMAL
WBC # BLD: 6.24 K/UL — SIGNIFICANT CHANGE UP (ref 3.8–10.5)
WBC # FLD AUTO: 6.24 K/UL — SIGNIFICANT CHANGE UP (ref 3.8–10.5)

## 2024-05-01 ENCOUNTER — OUTPATIENT (OUTPATIENT)
Dept: OUTPATIENT SERVICES | Facility: HOSPITAL | Age: 69
LOS: 1 days | Discharge: HOME | End: 2024-05-01

## 2024-05-01 DIAGNOSIS — D64.9 ANEMIA, UNSPECIFIED: ICD-10-CM

## 2024-05-16 DIAGNOSIS — S06.5XAA TRAUMATIC SUBDURAL HEMORRHAGE WITH LOSS OF CONSCIOUSNESS STATUS UNKNOWN, INITIAL ENCOUNTER: ICD-10-CM

## 2024-05-17 ENCOUNTER — RESULT REVIEW (OUTPATIENT)
Age: 69
End: 2024-05-17

## 2024-05-17 ENCOUNTER — APPOINTMENT (OUTPATIENT)
Dept: HEMATOLOGY ONCOLOGY | Facility: CLINIC | Age: 69
End: 2024-05-17

## 2024-05-17 ENCOUNTER — APPOINTMENT (OUTPATIENT)
Dept: INFUSION THERAPY | Facility: HOSPITAL | Age: 69
End: 2024-05-17

## 2024-05-17 LAB
BASOPHILS # BLD AUTO: 0.04 K/UL — SIGNIFICANT CHANGE UP (ref 0–0.2)
BASOPHILS NFR BLD AUTO: 0.5 % — SIGNIFICANT CHANGE UP (ref 0–2)
EOSINOPHIL # BLD AUTO: 0.07 K/UL — SIGNIFICANT CHANGE UP (ref 0–0.5)
EOSINOPHIL NFR BLD AUTO: 0.9 % — SIGNIFICANT CHANGE UP (ref 0–6)
HCT VFR BLD CALC: 33.1 % — LOW (ref 39–50)
HGB BLD-MCNC: 12.1 G/DL — LOW (ref 13–17)
IMM GRANULOCYTES NFR BLD AUTO: 0.5 % — SIGNIFICANT CHANGE UP (ref 0–0.9)
LYMPHOCYTES # BLD AUTO: 1.13 K/UL — SIGNIFICANT CHANGE UP (ref 1–3.3)
LYMPHOCYTES # BLD AUTO: 14.4 % — SIGNIFICANT CHANGE UP (ref 13–44)
MCHC RBC-ENTMCNC: 36.6 G/DL — HIGH (ref 32–36)
MCHC RBC-ENTMCNC: 38.9 PG — HIGH (ref 27–34)
MCV RBC AUTO: 106.4 FL — HIGH (ref 80–100)
MONOCYTES # BLD AUTO: 1.71 K/UL — HIGH (ref 0–0.9)
MONOCYTES NFR BLD AUTO: 21.8 % — HIGH (ref 2–14)
NEUTROPHILS # BLD AUTO: 4.87 K/UL — SIGNIFICANT CHANGE UP (ref 1.8–7.4)
NEUTROPHILS NFR BLD AUTO: 61.9 % — SIGNIFICANT CHANGE UP (ref 43–77)
NRBC # BLD: 0 /100 WBCS — SIGNIFICANT CHANGE UP (ref 0–0)
PLATELET # BLD AUTO: 137 K/UL — LOW (ref 150–400)
RBC # BLD: 3.11 M/UL — LOW (ref 4.2–5.8)
RBC # FLD: 15.7 % — HIGH (ref 10.3–14.5)
WBC # BLD: 7.86 K/UL — SIGNIFICANT CHANGE UP (ref 3.8–10.5)
WBC # FLD AUTO: 7.86 K/UL — SIGNIFICANT CHANGE UP (ref 3.8–10.5)

## 2024-05-20 DIAGNOSIS — R11.2 NAUSEA WITH VOMITING, UNSPECIFIED: ICD-10-CM

## 2024-05-20 DIAGNOSIS — C20 MALIGNANT NEOPLASM OF RECTUM: ICD-10-CM

## 2024-05-20 DIAGNOSIS — Z51.11 ENCOUNTER FOR ANTINEOPLASTIC CHEMOTHERAPY: ICD-10-CM

## 2024-05-31 ENCOUNTER — RESULT REVIEW (OUTPATIENT)
Age: 69
End: 2024-05-31

## 2024-05-31 ENCOUNTER — APPOINTMENT (OUTPATIENT)
Dept: HEMATOLOGY ONCOLOGY | Facility: CLINIC | Age: 69
End: 2024-05-31

## 2024-05-31 ENCOUNTER — APPOINTMENT (OUTPATIENT)
Dept: INFUSION THERAPY | Facility: HOSPITAL | Age: 69
End: 2024-05-31

## 2024-05-31 LAB
ANISOCYTOSIS BLD QL: SLIGHT — SIGNIFICANT CHANGE UP
BASOPHILS # BLD AUTO: 0.04 K/UL — SIGNIFICANT CHANGE UP (ref 0–0.2)
BASOPHILS NFR BLD AUTO: 0.5 % — SIGNIFICANT CHANGE UP (ref 0–2)
EOSINOPHIL # BLD AUTO: 0.03 K/UL — SIGNIFICANT CHANGE UP (ref 0–0.5)
EOSINOPHIL NFR BLD AUTO: 0.4 % — SIGNIFICANT CHANGE UP (ref 0–6)
HCT VFR BLD CALC: 33.5 % — LOW (ref 39–50)
HGB BLD-MCNC: 11.9 G/DL — LOW (ref 13–17)
IMM GRANULOCYTES NFR BLD AUTO: 0.7 % — SIGNIFICANT CHANGE UP (ref 0–0.9)
LYMPHOCYTES # BLD AUTO: 0.8 K/UL — LOW (ref 1–3.3)
LYMPHOCYTES # BLD AUTO: 10.8 % — LOW (ref 13–44)
MACROCYTES BLD QL: SLIGHT — SIGNIFICANT CHANGE UP
MCHC RBC-ENTMCNC: 35.5 G/DL — SIGNIFICANT CHANGE UP (ref 32–36)
MCHC RBC-ENTMCNC: 38.8 PG — HIGH (ref 27–34)
MCV RBC AUTO: 109.1 FL — HIGH (ref 80–100)
MONOCYTES # BLD AUTO: 1.58 K/UL — HIGH (ref 0–0.9)
MONOCYTES NFR BLD AUTO: 21.4 % — HIGH (ref 2–14)
NEUTROPHILS # BLD AUTO: 4.9 K/UL — SIGNIFICANT CHANGE UP (ref 1.8–7.4)
NEUTROPHILS NFR BLD AUTO: 66.2 % — SIGNIFICANT CHANGE UP (ref 43–77)
NRBC # BLD: 0 /100 WBCS — SIGNIFICANT CHANGE UP (ref 0–0)
PLAT MORPH BLD: NORMAL — SIGNIFICANT CHANGE UP
PLATELET # BLD AUTO: 177 K/UL — SIGNIFICANT CHANGE UP (ref 150–400)
RBC # BLD: 3.07 M/UL — LOW (ref 4.2–5.8)
RBC # FLD: 15.5 % — HIGH (ref 10.3–14.5)
RBC BLD AUTO: ABNORMAL
WBC # BLD: 7.4 K/UL — SIGNIFICANT CHANGE UP (ref 3.8–10.5)
WBC # FLD AUTO: 7.4 K/UL — SIGNIFICANT CHANGE UP (ref 3.8–10.5)

## 2024-06-14 ENCOUNTER — APPOINTMENT (OUTPATIENT)
Dept: INFUSION THERAPY | Facility: HOSPITAL | Age: 69
End: 2024-06-14

## 2024-06-14 ENCOUNTER — APPOINTMENT (OUTPATIENT)
Dept: HEMATOLOGY ONCOLOGY | Facility: CLINIC | Age: 69
End: 2024-06-14

## 2024-06-28 ENCOUNTER — NON-APPOINTMENT (OUTPATIENT)
Age: 69
End: 2024-06-28

## 2024-06-28 ENCOUNTER — APPOINTMENT (OUTPATIENT)
Dept: INFUSION THERAPY | Facility: HOSPITAL | Age: 69
End: 2024-06-28

## 2024-07-01 DIAGNOSIS — E86.0 DEHYDRATION: ICD-10-CM

## 2024-07-08 ENCOUNTER — OUTPATIENT (OUTPATIENT)
Dept: OUTPATIENT SERVICES | Facility: HOSPITAL | Age: 69
LOS: 1 days | Discharge: HOME | End: 2024-07-08

## 2024-07-08 DIAGNOSIS — D64.9 ANEMIA, UNSPECIFIED: ICD-10-CM

## 2024-07-12 ENCOUNTER — APPOINTMENT (OUTPATIENT)
Dept: INFUSION THERAPY | Facility: HOSPITAL | Age: 69
End: 2024-07-12

## 2024-07-15 DIAGNOSIS — R11.2 NAUSEA WITH VOMITING, UNSPECIFIED: ICD-10-CM

## 2024-07-15 DIAGNOSIS — C20 MALIGNANT NEOPLASM OF RECTUM: ICD-10-CM

## 2024-07-15 DIAGNOSIS — Z51.11 ENCOUNTER FOR ANTINEOPLASTIC CHEMOTHERAPY: ICD-10-CM

## 2024-07-26 ENCOUNTER — NON-APPOINTMENT (OUTPATIENT)
Age: 69
End: 2024-07-26

## 2024-07-26 ENCOUNTER — APPOINTMENT (OUTPATIENT)
Dept: INFUSION THERAPY | Facility: HOSPITAL | Age: 69
End: 2024-07-26

## 2024-08-09 ENCOUNTER — APPOINTMENT (OUTPATIENT)
Dept: INFUSION THERAPY | Facility: HOSPITAL | Age: 69
End: 2024-08-09

## 2024-08-23 ENCOUNTER — APPOINTMENT (OUTPATIENT)
Dept: INFUSION THERAPY | Facility: HOSPITAL | Age: 69
End: 2024-08-23

## 2024-09-06 ENCOUNTER — NON-APPOINTMENT (OUTPATIENT)
Age: 69
End: 2024-09-06

## 2024-09-06 ENCOUNTER — APPOINTMENT (OUTPATIENT)
Dept: INFUSION THERAPY | Facility: HOSPITAL | Age: 69
End: 2024-09-06

## 2024-09-13 ENCOUNTER — OUTPATIENT (OUTPATIENT)
Dept: OUTPATIENT SERVICES | Facility: HOSPITAL | Age: 69
LOS: 1 days | Discharge: HOME | End: 2024-09-13

## 2024-09-13 DIAGNOSIS — D64.9 ANEMIA, UNSPECIFIED: ICD-10-CM

## 2024-09-20 ENCOUNTER — NON-APPOINTMENT (OUTPATIENT)
Age: 69
End: 2024-09-20

## 2024-09-20 ENCOUNTER — APPOINTMENT (OUTPATIENT)
Dept: INFUSION THERAPY | Facility: HOSPITAL | Age: 69
End: 2024-09-20

## 2024-09-20 DIAGNOSIS — Z51.11 ENCOUNTER FOR ANTINEOPLASTIC CHEMOTHERAPY: ICD-10-CM

## 2024-09-20 DIAGNOSIS — R11.2 NAUSEA WITH VOMITING, UNSPECIFIED: ICD-10-CM

## 2024-09-20 DIAGNOSIS — C20 MALIGNANT NEOPLASM OF RECTUM: ICD-10-CM

## 2024-10-04 ENCOUNTER — APPOINTMENT (OUTPATIENT)
Dept: INFUSION THERAPY | Facility: HOSPITAL | Age: 69
End: 2024-10-04

## 2024-10-07 ENCOUNTER — NON-APPOINTMENT (OUTPATIENT)
Age: 69
End: 2024-10-07

## 2024-10-18 ENCOUNTER — NON-APPOINTMENT (OUTPATIENT)
Age: 69
End: 2024-10-18

## 2024-10-18 ENCOUNTER — APPOINTMENT (OUTPATIENT)
Dept: INFUSION THERAPY | Facility: HOSPITAL | Age: 69
End: 2024-10-18

## 2024-11-01 ENCOUNTER — RESULT REVIEW (OUTPATIENT)
Age: 69
End: 2024-11-01

## 2024-11-01 ENCOUNTER — APPOINTMENT (OUTPATIENT)
Dept: INFUSION THERAPY | Facility: HOSPITAL | Age: 69
End: 2024-11-01

## 2024-11-01 LAB
ANISOCYTOSIS BLD QL: SLIGHT — SIGNIFICANT CHANGE UP
BASOPHILS # BLD AUTO: 0.01 K/UL — SIGNIFICANT CHANGE UP (ref 0–0.2)
BASOPHILS NFR BLD AUTO: 0.2 % — SIGNIFICANT CHANGE UP (ref 0–2)
EOSINOPHIL # BLD AUTO: 0.01 K/UL — SIGNIFICANT CHANGE UP (ref 0–0.5)
EOSINOPHIL NFR BLD AUTO: 0.2 % — SIGNIFICANT CHANGE UP (ref 0–6)
HCT VFR BLD CALC: 32.3 % — LOW (ref 39–50)
HGB BLD-MCNC: 10.9 G/DL — LOW (ref 13–17)
IMM GRANULOCYTES NFR BLD AUTO: 1 % — HIGH (ref 0–0.9)
LYMPHOCYTES # BLD AUTO: 0.57 K/UL — LOW (ref 1–3.3)
LYMPHOCYTES # BLD AUTO: 11.4 % — LOW (ref 13–44)
MACROCYTES BLD QL: SIGNIFICANT CHANGE UP
MCHC RBC-ENTMCNC: 33.7 G/DL — SIGNIFICANT CHANGE UP (ref 32–36)
MCHC RBC-ENTMCNC: 38 PG — HIGH (ref 27–34)
MCV RBC AUTO: 112.5 FL — HIGH (ref 80–100)
MONOCYTES # BLD AUTO: 0.45 K/UL — SIGNIFICANT CHANGE UP (ref 0–0.9)
MONOCYTES NFR BLD AUTO: 9 % — SIGNIFICANT CHANGE UP (ref 2–14)
NEUTROPHILS # BLD AUTO: 3.89 K/UL — SIGNIFICANT CHANGE UP (ref 1.8–7.4)
NEUTROPHILS NFR BLD AUTO: 78.2 % — HIGH (ref 43–77)
NRBC # BLD: 0 /100 WBCS — SIGNIFICANT CHANGE UP (ref 0–0)
PLAT MORPH BLD: NORMAL — SIGNIFICANT CHANGE UP
PLATELET # BLD AUTO: 115 K/UL — LOW (ref 150–400)
RBC # BLD: 2.87 M/UL — LOW (ref 4.2–5.8)
RBC # FLD: 17.4 % — HIGH (ref 10.3–14.5)
RBC BLD AUTO: ABNORMAL
WBC # BLD: 4.98 K/UL — SIGNIFICANT CHANGE UP (ref 3.8–10.5)
WBC # FLD AUTO: 4.98 K/UL — SIGNIFICANT CHANGE UP (ref 3.8–10.5)

## 2024-11-11 ENCOUNTER — OUTPATIENT (OUTPATIENT)
Dept: OUTPATIENT SERVICES | Facility: HOSPITAL | Age: 69
LOS: 1 days | Discharge: HOME | End: 2024-11-11

## 2024-11-11 DIAGNOSIS — D64.9 ANEMIA, UNSPECIFIED: ICD-10-CM

## 2024-11-15 ENCOUNTER — APPOINTMENT (OUTPATIENT)
Dept: INFUSION THERAPY | Facility: HOSPITAL | Age: 69
End: 2024-11-15

## 2024-11-15 ENCOUNTER — NON-APPOINTMENT (OUTPATIENT)
Age: 69
End: 2024-11-15

## 2024-11-18 DIAGNOSIS — R11.2 NAUSEA WITH VOMITING, UNSPECIFIED: ICD-10-CM

## 2024-11-18 DIAGNOSIS — Z51.11 ENCOUNTER FOR ANTINEOPLASTIC CHEMOTHERAPY: ICD-10-CM

## 2024-11-18 DIAGNOSIS — C20 MALIGNANT NEOPLASM OF RECTUM: ICD-10-CM

## 2024-11-29 ENCOUNTER — APPOINTMENT (OUTPATIENT)
Dept: INFUSION THERAPY | Facility: HOSPITAL | Age: 69
End: 2024-11-29

## 2024-12-13 ENCOUNTER — APPOINTMENT (OUTPATIENT)
Dept: INFUSION THERAPY | Facility: HOSPITAL | Age: 69
End: 2024-12-13

## 2024-12-27 ENCOUNTER — APPOINTMENT (OUTPATIENT)
Dept: INFUSION THERAPY | Facility: HOSPITAL | Age: 69
End: 2024-12-27

## 2025-01-01 ENCOUNTER — INPATIENT (INPATIENT)
Facility: HOSPITAL | Age: 70
LOS: 3 days | Discharge: HOME HEALTH | DRG: 374 | End: 2025-09-05
Attending: STUDENT IN AN ORGANIZED HEALTH CARE EDUCATION/TRAINING PROGRAM | Admitting: INTERNAL MEDICINE
Payer: MEDICARE

## 2025-01-01 ENCOUNTER — OUTPATIENT (OUTPATIENT)
Dept: OUTPATIENT SERVICES | Facility: HOSPITAL | Age: 70
LOS: 1 days | Discharge: HOME | End: 2025-01-01
Payer: MEDICARE

## 2025-01-01 ENCOUNTER — APPOINTMENT (OUTPATIENT)
Dept: GERIATRICS | Facility: HOME HEALTH | Age: 70
End: 2025-01-01
Payer: MEDICARE

## 2025-01-01 VITALS
OXYGEN SATURATION: 96 % | DIASTOLIC BLOOD PRESSURE: 94 MMHG | TEMPERATURE: 93 F | RESPIRATION RATE: 22 BRPM | HEIGHT: 67 IN | HEART RATE: 104 BPM | SYSTOLIC BLOOD PRESSURE: 136 MMHG

## 2025-01-01 VITALS
DIASTOLIC BLOOD PRESSURE: 75 MMHG | HEIGHT: 67 IN | HEART RATE: 89 BPM | RESPIRATION RATE: 20 BRPM | TEMPERATURE: 98 F | OXYGEN SATURATION: 96 % | SYSTOLIC BLOOD PRESSURE: 115 MMHG | WEIGHT: 108.03 LBS

## 2025-01-01 VITALS
SYSTOLIC BLOOD PRESSURE: 115 MMHG | OXYGEN SATURATION: 98 % | RESPIRATION RATE: 18 BRPM | TEMPERATURE: 98 F | DIASTOLIC BLOOD PRESSURE: 82 MMHG | HEART RATE: 103 BPM

## 2025-01-01 VITALS
OXYGEN SATURATION: 95 % | DIASTOLIC BLOOD PRESSURE: 65 MMHG | TEMPERATURE: 100.8 F | HEART RATE: 96 BPM | SYSTOLIC BLOOD PRESSURE: 110 MMHG

## 2025-01-01 VITALS
OXYGEN SATURATION: 98 % | SYSTOLIC BLOOD PRESSURE: 115 MMHG | RESPIRATION RATE: 18 BRPM | DIASTOLIC BLOOD PRESSURE: 63 MMHG | HEART RATE: 77 BPM

## 2025-01-01 DIAGNOSIS — C19 MALIGNANT NEOPLASM OF RECTOSIGMOID JUNCTION: ICD-10-CM

## 2025-01-01 DIAGNOSIS — E43 UNSPECIFIED SEVERE PROTEIN-CALORIE MALNUTRITION: ICD-10-CM

## 2025-01-01 DIAGNOSIS — R64 CACHEXIA: ICD-10-CM

## 2025-01-01 DIAGNOSIS — Z93.9 ARTIFICIAL OPENING STATUS, UNSPECIFIED: Chronic | ICD-10-CM

## 2025-01-01 DIAGNOSIS — K21.9 GASTRO-ESOPHAGEAL REFLUX DISEASE W/OUT ESOPHAGITIS: ICD-10-CM

## 2025-01-01 DIAGNOSIS — Z79.899 OTHER LONG TERM (CURRENT) DRUG THERAPY: ICD-10-CM

## 2025-01-01 DIAGNOSIS — E87.20 ACIDOSIS, UNSPECIFIED: ICD-10-CM

## 2025-01-01 DIAGNOSIS — R18.0 MALIGNANT ASCITES: ICD-10-CM

## 2025-01-01 DIAGNOSIS — K21.9 GASTRO-ESOPHAGEAL REFLUX DISEASE WITHOUT ESOPHAGITIS: ICD-10-CM

## 2025-01-01 DIAGNOSIS — Z51.5 ENCOUNTER FOR PALLIATIVE CARE: ICD-10-CM

## 2025-01-01 DIAGNOSIS — D63.0 ANEMIA IN NEOPLASTIC DISEASE: ICD-10-CM

## 2025-01-01 DIAGNOSIS — E88.09 OTHER DISORDERS OF PLASMA-PROTEIN METABOLISM, NOT ELSEWHERE CLASSIFIED: ICD-10-CM

## 2025-01-01 DIAGNOSIS — K74.60 UNSPECIFIED CIRRHOSIS OF LIVER: ICD-10-CM

## 2025-01-01 DIAGNOSIS — J43.9 EMPHYSEMA, UNSPECIFIED: ICD-10-CM

## 2025-01-01 DIAGNOSIS — Z46.82 ENCOUNTER FOR FITTING AND ADJUSTMENT OF NON-VASCULAR CATHETER: ICD-10-CM

## 2025-01-01 DIAGNOSIS — Z66 DO NOT RESUSCITATE: ICD-10-CM

## 2025-01-01 DIAGNOSIS — C78.7 SECONDARY MALIGNANT NEOPLASM OF LIVER AND INTRAHEPATIC BILE DUCT: ICD-10-CM

## 2025-01-01 DIAGNOSIS — R14.0 ABDOMINAL DISTENSION (GASEOUS): ICD-10-CM

## 2025-01-01 DIAGNOSIS — D84.821 IMMUNODEFICIENCY DUE TO DRUGS: ICD-10-CM

## 2025-01-01 DIAGNOSIS — C78.00 SECONDARY MALIGNANT NEOPLASM OF UNSPECIFIED LUNG: ICD-10-CM

## 2025-01-01 DIAGNOSIS — C20 MALIGNANT NEOPLASM OF RECTUM: ICD-10-CM

## 2025-01-01 DIAGNOSIS — R18.8 OTHER ASCITES: ICD-10-CM

## 2025-01-01 DIAGNOSIS — R06.02 SHORTNESS OF BREATH: ICD-10-CM

## 2025-01-01 DIAGNOSIS — I21.A1 MYOCARDIAL INFARCTION TYPE 2: ICD-10-CM

## 2025-01-01 DIAGNOSIS — D84.81 IMMUNODEFICIENCY DUE TO CONDITIONS CLASSIFIED ELSEWHERE: ICD-10-CM

## 2025-01-01 DIAGNOSIS — Z93.3 COLOSTOMY STATUS: ICD-10-CM

## 2025-01-01 DIAGNOSIS — Z49.02 ENCOUNTER FOR FITTING AND ADJUSTMENT OF PERITONEAL DIALYSIS CATHETER: ICD-10-CM

## 2025-01-01 DIAGNOSIS — K76.6 PORTAL HYPERTENSION: ICD-10-CM

## 2025-01-01 DIAGNOSIS — Z92.21 PERSONAL HISTORY OF ANTINEOPLASTIC CHEMOTHERAPY: ICD-10-CM

## 2025-01-01 DIAGNOSIS — C78.6 SECONDARY MALIGNANT NEOPLASM OF RETROPERITONEUM AND PERITONEUM: ICD-10-CM

## 2025-01-01 LAB
ALBUMIN FLD-MCNC: 0.6 G/DL — SIGNIFICANT CHANGE UP
ALBUMIN SERPL ELPH-MCNC: 2.6 G/DL — LOW (ref 3.5–5.2)
ALBUMIN SERPL ELPH-MCNC: 2.8 G/DL — LOW (ref 3.5–5.2)
ALBUMIN SERPL ELPH-MCNC: SIGNIFICANT CHANGE UP G/DL (ref 3.5–5.2)
ALP SERPL-CCNC: 354 U/L — HIGH (ref 30–115)
ALP SERPL-CCNC: 412 U/L — HIGH (ref 30–115)
ALP SERPL-CCNC: SIGNIFICANT CHANGE UP U/L (ref 30–115)
ALT FLD-CCNC: 18 U/L — SIGNIFICANT CHANGE UP (ref 0–41)
ALT FLD-CCNC: 20 U/L — SIGNIFICANT CHANGE UP (ref 0–41)
ALT FLD-CCNC: SIGNIFICANT CHANGE UP U/L (ref 0–41)
AMMONIA BLD-MCNC: 56 UMOL/L — HIGH (ref 11–55)
ANION GAP SERPL CALC-SCNC: 10 MMOL/L — SIGNIFICANT CHANGE UP (ref 7–14)
ANION GAP SERPL CALC-SCNC: 17 MMOL/L — HIGH (ref 7–14)
APTT BLD: 38.5 SEC — SIGNIFICANT CHANGE UP (ref 27–39.2)
AST SERPL-CCNC: 59 U/L — HIGH (ref 0–41)
AST SERPL-CCNC: 90 U/L — HIGH (ref 0–41)
AST SERPL-CCNC: SIGNIFICANT CHANGE UP U/L (ref 0–41)
B PERT IGG+IGM PNL SER: CLEAR — SIGNIFICANT CHANGE UP
BASOPHILS # BLD AUTO: 0.01 K/UL — SIGNIFICANT CHANGE UP (ref 0–0.2)
BASOPHILS # BLD AUTO: 0.02 K/UL — SIGNIFICANT CHANGE UP (ref 0–0.2)
BASOPHILS NFR BLD AUTO: 0.1 % — SIGNIFICANT CHANGE UP (ref 0–1)
BASOPHILS NFR BLD AUTO: 0.2 % — SIGNIFICANT CHANGE UP (ref 0–1)
BILIRUB SERPL-MCNC: 1.2 MG/DL — SIGNIFICANT CHANGE UP (ref 0.2–1.2)
BILIRUB SERPL-MCNC: 1.9 MG/DL — HIGH (ref 0.2–1.2)
BILIRUB SERPL-MCNC: SIGNIFICANT CHANGE UP MG/DL (ref 0.2–1.2)
BUN SERPL-MCNC: 29 MG/DL — HIGH (ref 10–20)
BUN SERPL-MCNC: 34 MG/DL — HIGH (ref 10–20)
BUN SERPL-MCNC: SIGNIFICANT CHANGE UP MG/DL (ref 10–20)
CALCIUM SERPL-MCNC: 8.3 MG/DL — LOW (ref 8.4–10.5)
CALCIUM SERPL-MCNC: 8.5 MG/DL — SIGNIFICANT CHANGE UP (ref 8.4–10.5)
CALCIUM SERPL-MCNC: SIGNIFICANT CHANGE UP MG/DL (ref 8.4–10.5)
CHLORIDE SERPL-SCNC: 102 MMOL/L — SIGNIFICANT CHANGE UP (ref 98–110)
CHLORIDE SERPL-SCNC: 106 MMOL/L — SIGNIFICANT CHANGE UP (ref 98–110)
CHLORIDE SERPL-SCNC: SIGNIFICANT CHANGE UP MMOL/L (ref 98–110)
CO2 SERPL-SCNC: 17 MMOL/L — SIGNIFICANT CHANGE UP (ref 17–32)
CO2 SERPL-SCNC: 25 MMOL/L — SIGNIFICANT CHANGE UP (ref 17–32)
CO2 SERPL-SCNC: SIGNIFICANT CHANGE UP MMOL/L (ref 17–32)
COLOR FLD: YELLOW — SIGNIFICANT CHANGE UP
CREAT SERPL-MCNC: 0.7 MG/DL — SIGNIFICANT CHANGE UP (ref 0.7–1.5)
CREAT SERPL-MCNC: 0.8 MG/DL — SIGNIFICANT CHANGE UP (ref 0.7–1.5)
CREAT SERPL-MCNC: SIGNIFICANT CHANGE UP MG/DL (ref 0.7–1.5)
CULTURE RESULTS: SIGNIFICANT CHANGE UP
EGFR: 95 ML/MIN/1.73M2 — SIGNIFICANT CHANGE UP
EGFR: 95 ML/MIN/1.73M2 — SIGNIFICANT CHANGE UP
EGFR: 99 ML/MIN/1.73M2 — SIGNIFICANT CHANGE UP
EGFR: 99 ML/MIN/1.73M2 — SIGNIFICANT CHANGE UP
EOSINOPHIL # BLD AUTO: 0.02 K/UL — SIGNIFICANT CHANGE UP (ref 0–0.7)
EOSINOPHIL # BLD AUTO: 0.02 K/UL — SIGNIFICANT CHANGE UP (ref 0–0.7)
EOSINOPHIL NFR BLD AUTO: 0.2 % — SIGNIFICANT CHANGE UP (ref 0–8)
EOSINOPHIL NFR BLD AUTO: 0.2 % — SIGNIFICANT CHANGE UP (ref 0–8)
FLUID INTAKE SUBSTANCE CLASS: SIGNIFICANT CHANGE UP
GAS PNL BLDA: SIGNIFICANT CHANGE UP
GAS PNL BLDV: SIGNIFICANT CHANGE UP
GLUCOSE FLD-MCNC: 104 MG/DL — SIGNIFICANT CHANGE UP
GLUCOSE SERPL-MCNC: 105 MG/DL — HIGH (ref 70–99)
GLUCOSE SERPL-MCNC: 119 MG/DL — HIGH (ref 70–99)
GLUCOSE SERPL-MCNC: SIGNIFICANT CHANGE UP MG/DL (ref 70–99)
GRAM STN FLD: SIGNIFICANT CHANGE UP
HAV IGM SER-ACNC: SIGNIFICANT CHANGE UP
HBV CORE IGM SER-ACNC: SIGNIFICANT CHANGE UP
HBV SURFACE AG SER-ACNC: SIGNIFICANT CHANGE UP
HCT VFR BLD CALC: 26.2 % — LOW (ref 42–52)
HCT VFR BLD CALC: 27.2 % — LOW (ref 42–52)
HCT VFR BLD CALC: 32 % — LOW (ref 42–52)
HCV AB S/CO SERPL IA: 0.19 S/CO — SIGNIFICANT CHANGE UP (ref 0–0.79)
HCV AB SERPL-IMP: SIGNIFICANT CHANGE UP
HGB BLD-MCNC: 10.3 G/DL — LOW (ref 14–18)
HGB BLD-MCNC: 12.9 G/DL — LOW (ref 14–18)
HGB BLD-MCNC: 9.9 G/DL — LOW (ref 14–18)
IMM GRANULOCYTES NFR BLD AUTO: 0.3 % — SIGNIFICANT CHANGE UP (ref 0.1–0.3)
IMM GRANULOCYTES NFR BLD AUTO: 0.4 % — HIGH (ref 0.1–0.3)
INR BLD: 1.22 RATIO — SIGNIFICANT CHANGE UP (ref 0.65–1.3)
LACTATE SERPL-SCNC: 4.7 MMOL/L — CRITICAL HIGH (ref 0.7–2)
LDH SERPL L TO P-CCNC: 81 U/L — SIGNIFICANT CHANGE UP
LYMPHOCYTES # BLD AUTO: 0.33 K/UL — LOW (ref 1.2–3.4)
LYMPHOCYTES # BLD AUTO: 0.55 K/UL — LOW (ref 1.2–3.4)
LYMPHOCYTES # BLD AUTO: 3.9 % — LOW (ref 20.5–51.1)
LYMPHOCYTES # BLD AUTO: 5.7 % — LOW (ref 20.5–51.1)
LYMPHOCYTES # FLD: 29 — SIGNIFICANT CHANGE UP
MAGNESIUM SERPL-MCNC: 2.2 MG/DL — SIGNIFICANT CHANGE UP (ref 1.8–2.4)
MCHC RBC-ENTMCNC: 36.4 G/DL — SIGNIFICANT CHANGE UP (ref 32–37)
MCHC RBC-ENTMCNC: 39.3 G/DL — HIGH (ref 32–37)
MCHC RBC-ENTMCNC: 40.3 G/DL — HIGH (ref 32–37)
MCHC RBC-ENTMCNC: 41.8 PG — HIGH (ref 27–31)
MCHC RBC-ENTMCNC: 45.6 PG — HIGH (ref 27–31)
MCHC RBC-ENTMCNC: 47.8 PG — HIGH (ref 27–31)
MCV RBC AUTO: 114.8 FL — HIGH (ref 80–94)
MCV RBC AUTO: 115.9 FL — HIGH (ref 80–94)
MCV RBC AUTO: 118.5 FL — HIGH (ref 80–94)
MONOCYTES # BLD AUTO: 0.67 K/UL — HIGH (ref 0.1–0.6)
MONOCYTES # BLD AUTO: 0.85 K/UL — HIGH (ref 0.1–0.6)
MONOCYTES NFR BLD AUTO: 7.9 % — SIGNIFICANT CHANGE UP (ref 1.7–9.3)
MONOCYTES NFR BLD AUTO: 8.9 % — SIGNIFICANT CHANGE UP (ref 1.7–9.3)
MONOS+MACROS # FLD: 59 % — SIGNIFICANT CHANGE UP
NEUTROPHILS # BLD AUTO: 7.45 K/UL — HIGH (ref 1.4–6.5)
NEUTROPHILS # BLD AUTO: 8.13 K/UL — HIGH (ref 1.4–6.5)
NEUTROPHILS NFR BLD AUTO: 84.8 % — HIGH (ref 42.2–75.2)
NEUTROPHILS NFR BLD AUTO: 87.4 % — HIGH (ref 42.2–75.2)
NEUTROPHILS-BODY FLUID: 12 % — SIGNIFICANT CHANGE UP
NRBC BLD AUTO-RTO: 0 /100 WBCS — SIGNIFICANT CHANGE UP (ref 0–0)
NT-PROBNP SERPL-SCNC: 965 PG/ML — HIGH (ref 0–300)
PHOSPHATE SERPL-MCNC: 2.8 MG/DL — SIGNIFICANT CHANGE UP (ref 2.1–4.9)
PLATELET # BLD AUTO: 141 K/UL — SIGNIFICANT CHANGE UP (ref 130–400)
PLATELET # BLD AUTO: 145 K/UL — SIGNIFICANT CHANGE UP (ref 130–400)
PLATELET # BLD AUTO: 175 K/UL — SIGNIFICANT CHANGE UP (ref 130–400)
PMV BLD: 11.1 FL — HIGH (ref 7.4–10.4)
PMV BLD: 11.3 FL — HIGH (ref 7.4–10.4)
PMV BLD: 11.5 FL — HIGH (ref 7.4–10.4)
POTASSIUM SERPL-MCNC: 3.7 MMOL/L — SIGNIFICANT CHANGE UP (ref 3.5–5)
POTASSIUM SERPL-MCNC: 5 MMOL/L — SIGNIFICANT CHANGE UP (ref 3.5–5)
POTASSIUM SERPL-MCNC: SIGNIFICANT CHANGE UP MMOL/L (ref 3.5–5)
POTASSIUM SERPL-SCNC: 3.7 MMOL/L — SIGNIFICANT CHANGE UP (ref 3.5–5)
POTASSIUM SERPL-SCNC: 5 MMOL/L — SIGNIFICANT CHANGE UP (ref 3.5–5)
POTASSIUM SERPL-SCNC: SIGNIFICANT CHANGE UP MMOL/L (ref 3.5–5)
PROT FLD-MCNC: 1.3 G/DL — SIGNIFICANT CHANGE UP
PROT SERPL-MCNC: 6.4 G/DL — SIGNIFICANT CHANGE UP (ref 6–8)
PROT SERPL-MCNC: 8.1 G/DL — HIGH (ref 6–8)
PROT SERPL-MCNC: SIGNIFICANT CHANGE UP G/DL (ref 6–8)
PROTHROM AB SERPL-ACNC: 14.5 SEC — HIGH (ref 9.95–12.87)
RBC # BLD: 2.26 M/UL — LOW (ref 4.7–6.1)
RBC # BLD: 2.37 M/UL — LOW (ref 4.7–6.1)
RBC # BLD: 2.7 M/UL — LOW (ref 4.7–6.1)
RBC # FLD: 19.7 % — HIGH (ref 11.5–14.5)
RBC # FLD: 21.4 % — HIGH (ref 11.5–14.5)
RBC # FLD: 21.7 % — HIGH (ref 11.5–14.5)
RCV VOL RI: 0 /UL — SIGNIFICANT CHANGE UP (ref 0–0)
SODIUM SERPL-SCNC: 136 MMOL/L — SIGNIFICANT CHANGE UP (ref 135–146)
SODIUM SERPL-SCNC: 141 MMOL/L — SIGNIFICANT CHANGE UP (ref 135–146)
SODIUM SERPL-SCNC: SIGNIFICANT CHANGE UP MMOL/L (ref 135–146)
SPECIMEN SOURCE: SIGNIFICANT CHANGE UP
TOTAL NUCLEATED CELL COUNT, BODY FLUID: 34 /UL — SIGNIFICANT CHANGE UP
TROPONIN T, HIGH SENSITIVITY RESULT: 26 NG/L — HIGH
TROPONIN T, HIGH SENSITIVITY RESULT: 26 NG/L — HIGH
TUBE TYPE: SIGNIFICANT CHANGE UP
WBC # BLD: 8.52 K/UL — SIGNIFICANT CHANGE UP (ref 4.8–10.8)
WBC # BLD: 9.59 K/UL — SIGNIFICANT CHANGE UP (ref 4.8–10.8)
WBC # BLD: 9.85 K/UL — SIGNIFICANT CHANGE UP (ref 4.8–10.8)
WBC # FLD AUTO: 8.52 K/UL — SIGNIFICANT CHANGE UP (ref 4.8–10.8)
WBC # FLD AUTO: 9.59 K/UL — SIGNIFICANT CHANGE UP (ref 4.8–10.8)
WBC # FLD AUTO: 9.85 K/UL — SIGNIFICANT CHANGE UP (ref 4.8–10.8)

## 2025-01-01 PROCEDURE — 80074 ACUTE HEPATITIS PANEL: CPT

## 2025-01-01 PROCEDURE — 49418 INSERT TUN IP CATH PERC: CPT

## 2025-01-01 PROCEDURE — 99233 SBSQ HOSP IP/OBS HIGH 50: CPT

## 2025-01-01 PROCEDURE — 49083 ABD PARACENTESIS W/IMAGING: CPT

## 2025-01-01 PROCEDURE — P9047: CPT | Mod: JZ

## 2025-01-01 PROCEDURE — 84484 ASSAY OF TROPONIN QUANT: CPT

## 2025-01-01 PROCEDURE — 85610 PROTHROMBIN TIME: CPT

## 2025-01-01 PROCEDURE — 99232 SBSQ HOSP IP/OBS MODERATE 35: CPT

## 2025-01-01 PROCEDURE — C1729: CPT

## 2025-01-01 PROCEDURE — 93970 EXTREMITY STUDY: CPT

## 2025-01-01 PROCEDURE — 99285 EMERGENCY DEPT VISIT HI MDM: CPT | Mod: 25,GC

## 2025-01-01 PROCEDURE — 99223 1ST HOSP IP/OBS HIGH 75: CPT

## 2025-01-01 PROCEDURE — 84100 ASSAY OF PHOSPHORUS: CPT

## 2025-01-01 PROCEDURE — 93970 EXTREMITY STUDY: CPT | Mod: 26

## 2025-01-01 PROCEDURE — 83735 ASSAY OF MAGNESIUM: CPT

## 2025-01-01 PROCEDURE — 80053 COMPREHEN METABOLIC PANEL: CPT

## 2025-01-01 PROCEDURE — 94640 AIRWAY INHALATION TREATMENT: CPT

## 2025-01-01 PROCEDURE — 85027 COMPLETE CBC AUTOMATED: CPT

## 2025-01-01 PROCEDURE — 99496 TRANSJ CARE MGMT HIGH F2F 7D: CPT

## 2025-01-01 PROCEDURE — 87040 BLOOD CULTURE FOR BACTERIA: CPT

## 2025-01-01 PROCEDURE — 99497 ADVNCD CARE PLAN 30 MIN: CPT | Mod: 25

## 2025-01-01 PROCEDURE — 99497 ADVNCD CARE PLAN 30 MIN: CPT

## 2025-01-01 PROCEDURE — 36415 COLL VENOUS BLD VENIPUNCTURE: CPT

## 2025-01-01 PROCEDURE — 71045 X-RAY EXAM CHEST 1 VIEW: CPT | Mod: 26

## 2025-01-01 PROCEDURE — 71275 CT ANGIOGRAPHY CHEST: CPT | Mod: 26

## 2025-01-01 PROCEDURE — 99233 SBSQ HOSP IP/OBS HIGH 50: CPT | Mod: 25

## 2025-01-01 PROCEDURE — 74177 CT ABD & PELVIS W/CONTRAST: CPT | Mod: 26

## 2025-01-01 PROCEDURE — 85025 COMPLETE CBC W/AUTO DIFF WBC: CPT

## 2025-01-01 PROCEDURE — 85730 THROMBOPLASTIN TIME PARTIAL: CPT

## 2025-01-01 RX ORDER — IPRATROPIUM BROMIDE AND ALBUTEROL SULFATE .5; 2.5 MG/3ML; MG/3ML
3 SOLUTION RESPIRATORY (INHALATION)
Qty: 0 | Refills: 0 | DISCHARGE
Start: 2025-01-01

## 2025-01-01 RX ORDER — IPRATROPIUM BROMIDE AND ALBUTEROL SULFATE .5; 2.5 MG/3ML; MG/3ML
3 SOLUTION RESPIRATORY (INHALATION) EVERY 6 HOURS
Refills: 0 | Status: DISCONTINUED | OUTPATIENT
Start: 2025-01-01 | End: 2025-01-01

## 2025-01-01 RX ORDER — FUROSEMIDE 10 MG/ML
40 INJECTION INTRAMUSCULAR; INTRAVENOUS DAILY
Refills: 0 | Status: DISCONTINUED | OUTPATIENT
Start: 2025-01-01 | End: 2025-01-01

## 2025-01-01 RX ORDER — ALBUTEROL SULFATE 2.5 MG/3ML
2 VIAL, NEBULIZER (ML) INHALATION ONCE
Refills: 0 | Status: DISCONTINUED | OUTPATIENT
Start: 2025-01-01 | End: 2025-01-01

## 2025-01-01 RX ORDER — SIMETHICONE 125 MG
125 CAPSULE ORAL
Qty: 90 | Refills: 3 | Status: ACTIVE | COMMUNITY
Start: 2025-01-01 | End: 1900-01-01

## 2025-01-01 RX ORDER — MAGNESIUM, ALUMINUM HYDROXIDE 200-200 MG
30 TABLET,CHEWABLE ORAL EVERY 4 HOURS
Refills: 0 | Status: DISCONTINUED | OUTPATIENT
Start: 2025-01-01 | End: 2025-01-01

## 2025-01-01 RX ORDER — ENOXAPARIN SODIUM 100 MG/ML
40 INJECTION SUBCUTANEOUS EVERY 24 HOURS
Refills: 0 | Status: DISCONTINUED | OUTPATIENT
Start: 2025-01-01 | End: 2025-01-01

## 2025-01-01 RX ORDER — ALBUTEROL SULFATE 2.5 MG/3ML
2 VIAL, NEBULIZER (ML) INHALATION
Qty: 1 | Refills: 0
Start: 2025-01-01 | End: 2025-01-01

## 2025-01-01 RX ORDER — MELATONIN 5 MG
3 TABLET ORAL AT BEDTIME
Refills: 0 | Status: DISCONTINUED | OUTPATIENT
Start: 2025-01-01 | End: 2025-01-01

## 2025-01-01 RX ORDER — KETOROLAC TROMETHAMINE 30 MG/ML
15 INJECTION, SOLUTION INTRAMUSCULAR; INTRAVENOUS ONCE
Refills: 0 | Status: DISCONTINUED | OUTPATIENT
Start: 2025-01-01 | End: 2025-01-01

## 2025-01-01 RX ORDER — SIMETHICONE 80 MG
1 TABLET,CHEWABLE ORAL
Refills: 0 | DISCHARGE

## 2025-01-01 RX ORDER — LACTULOSE 10 G/15ML
20 SOLUTION ORAL EVERY 8 HOURS
Refills: 0 | Status: DISCONTINUED | OUTPATIENT
Start: 2025-01-01 | End: 2025-01-01

## 2025-01-01 RX ORDER — ALBUTEROL SULFATE 2.5 MG/3ML
2 VIAL, NEBULIZER (ML) INHALATION
Qty: 0 | Refills: 0
Start: 2025-01-01

## 2025-01-01 RX ORDER — CEFAZOLIN SODIUM IN 0.9 % NACL 3 G/100 ML
1000 INTRAVENOUS SOLUTION, PIGGYBACK (ML) INTRAVENOUS ONCE
Refills: 0 | Status: DISCONTINUED | OUTPATIENT
Start: 2025-01-01 | End: 2025-01-01

## 2025-01-01 RX ORDER — ALBUMIN (HUMAN) 12.5 G/50ML
100 INJECTION, SOLUTION INTRAVENOUS ONCE
Refills: 0 | Status: COMPLETED | OUTPATIENT
Start: 2025-01-01 | End: 2025-01-01

## 2025-01-01 RX ORDER — OXYCODONE HYDROCHLORIDE 5 MG/1
5 CAPSULE ORAL EVERY 6 HOURS
Qty: 56 | Refills: 0 | Status: ACTIVE | COMMUNITY
Start: 2025-01-01 | End: 1900-01-01

## 2025-01-01 RX ORDER — LACTULOSE 10 G/15ML
30 SOLUTION ORAL
Qty: 2700 | Refills: 0
Start: 2025-01-01 | End: 2025-10-04

## 2025-01-01 RX ORDER — ONDANSETRON HCL/PF 4 MG/2 ML
4 VIAL (ML) INJECTION EVERY 8 HOURS
Refills: 0 | Status: DISCONTINUED | OUTPATIENT
Start: 2025-01-01 | End: 2025-01-01

## 2025-01-01 RX ADMIN — Medication 40 MILLIGRAM(S): at 05:42

## 2025-01-01 RX ADMIN — Medication 1 APPLICATION(S): at 11:43

## 2025-01-01 RX ADMIN — ENOXAPARIN SODIUM 40 MILLIGRAM(S): 100 INJECTION SUBCUTANEOUS at 12:32

## 2025-01-01 RX ADMIN — IPRATROPIUM BROMIDE AND ALBUTEROL SULFATE 3 MILLILITER(S): .5; 2.5 SOLUTION RESPIRATORY (INHALATION) at 13:36

## 2025-01-01 RX ADMIN — FUROSEMIDE 40 MILLIGRAM(S): 10 INJECTION INTRAMUSCULAR; INTRAVENOUS at 12:19

## 2025-01-01 RX ADMIN — IPRATROPIUM BROMIDE AND ALBUTEROL SULFATE 3 MILLILITER(S): .5; 2.5 SOLUTION RESPIRATORY (INHALATION) at 14:17

## 2025-01-01 RX ADMIN — Medication 20 MILLIGRAM(S): at 12:32

## 2025-01-01 RX ADMIN — FUROSEMIDE 40 MILLIGRAM(S): 10 INJECTION INTRAMUSCULAR; INTRAVENOUS at 05:42

## 2025-01-01 RX ADMIN — Medication 20 MILLIGRAM(S): at 12:14

## 2025-01-01 RX ADMIN — KETOROLAC TROMETHAMINE 15 MILLIGRAM(S): 30 INJECTION, SOLUTION INTRAMUSCULAR; INTRAVENOUS at 22:19

## 2025-01-01 RX ADMIN — Medication 40 MILLIGRAM(S): at 06:01

## 2025-01-01 RX ADMIN — ALBUMIN (HUMAN) 50 MILLILITER(S): 12.5 INJECTION, SOLUTION INTRAVENOUS at 05:50

## 2025-01-01 RX ADMIN — IPRATROPIUM BROMIDE AND ALBUTEROL SULFATE 3 MILLILITER(S): .5; 2.5 SOLUTION RESPIRATORY (INHALATION) at 15:48

## 2025-01-01 RX ADMIN — Medication 30 MILLILITER(S): at 15:14

## 2025-01-01 RX ADMIN — ENOXAPARIN SODIUM 40 MILLIGRAM(S): 100 INJECTION SUBCUTANEOUS at 11:03

## 2025-01-01 RX ADMIN — IPRATROPIUM BROMIDE AND ALBUTEROL SULFATE 3 MILLILITER(S): .5; 2.5 SOLUTION RESPIRATORY (INHALATION) at 19:35

## 2025-01-01 RX ADMIN — FUROSEMIDE 40 MILLIGRAM(S): 10 INJECTION INTRAMUSCULAR; INTRAVENOUS at 05:22

## 2025-01-01 RX ADMIN — IPRATROPIUM BROMIDE AND ALBUTEROL SULFATE 3 MILLILITER(S): .5; 2.5 SOLUTION RESPIRATORY (INHALATION) at 13:59

## 2025-01-01 RX ADMIN — FUROSEMIDE 40 MILLIGRAM(S): 10 INJECTION INTRAMUSCULAR; INTRAVENOUS at 06:00

## 2025-01-01 RX ADMIN — Medication 40 MILLIGRAM(S): at 05:25

## 2025-01-01 RX ADMIN — IPRATROPIUM BROMIDE AND ALBUTEROL SULFATE 3 MILLILITER(S): .5; 2.5 SOLUTION RESPIRATORY (INHALATION) at 07:48

## 2025-01-01 RX ADMIN — IPRATROPIUM BROMIDE AND ALBUTEROL SULFATE 3 MILLILITER(S): .5; 2.5 SOLUTION RESPIRATORY (INHALATION) at 01:25

## 2025-01-01 RX ADMIN — ENOXAPARIN SODIUM 40 MILLIGRAM(S): 100 INJECTION SUBCUTANEOUS at 12:23

## 2025-01-01 RX ADMIN — IPRATROPIUM BROMIDE AND ALBUTEROL SULFATE 3 MILLILITER(S): .5; 2.5 SOLUTION RESPIRATORY (INHALATION) at 01:06

## 2025-01-01 RX ADMIN — IPRATROPIUM BROMIDE AND ALBUTEROL SULFATE 3 MILLILITER(S): .5; 2.5 SOLUTION RESPIRATORY (INHALATION) at 08:58

## 2025-01-01 RX ADMIN — Medication 1 APPLICATION(S): at 12:26

## 2025-01-01 RX ADMIN — IPRATROPIUM BROMIDE AND ALBUTEROL SULFATE 3 MILLILITER(S): .5; 2.5 SOLUTION RESPIRATORY (INHALATION) at 08:01

## 2025-01-01 RX ADMIN — LACTULOSE 20 GRAM(S): 10 SOLUTION ORAL at 11:03

## 2025-01-01 RX ADMIN — IPRATROPIUM BROMIDE AND ALBUTEROL SULFATE 3 MILLILITER(S): .5; 2.5 SOLUTION RESPIRATORY (INHALATION) at 19:23

## 2025-01-01 RX ADMIN — ENOXAPARIN SODIUM 40 MILLIGRAM(S): 100 INJECTION SUBCUTANEOUS at 12:14

## 2025-01-10 ENCOUNTER — APPOINTMENT (OUTPATIENT)
Dept: INFUSION THERAPY | Facility: HOSPITAL | Age: 70
End: 2025-01-10

## 2025-01-13 DIAGNOSIS — E86.0 DEHYDRATION: ICD-10-CM

## 2025-01-21 ENCOUNTER — OUTPATIENT (OUTPATIENT)
Dept: OUTPATIENT SERVICES | Facility: HOSPITAL | Age: 70
LOS: 1 days | Discharge: HOME | End: 2025-01-21

## 2025-01-21 DIAGNOSIS — D64.9 ANEMIA, UNSPECIFIED: ICD-10-CM

## 2025-01-24 ENCOUNTER — NON-APPOINTMENT (OUTPATIENT)
Age: 70
End: 2025-01-24

## 2025-01-24 ENCOUNTER — APPOINTMENT (OUTPATIENT)
Dept: INFUSION THERAPY | Facility: HOSPITAL | Age: 70
End: 2025-01-24

## 2025-01-27 DIAGNOSIS — C20 MALIGNANT NEOPLASM OF RECTUM: ICD-10-CM

## 2025-01-27 DIAGNOSIS — Z51.11 ENCOUNTER FOR ANTINEOPLASTIC CHEMOTHERAPY: ICD-10-CM

## 2025-01-27 DIAGNOSIS — R11.2 NAUSEA WITH VOMITING, UNSPECIFIED: ICD-10-CM

## 2025-02-07 ENCOUNTER — APPOINTMENT (OUTPATIENT)
Dept: INFUSION THERAPY | Facility: HOSPITAL | Age: 70
End: 2025-02-07

## 2025-02-10 ENCOUNTER — NON-APPOINTMENT (OUTPATIENT)
Age: 70
End: 2025-02-10

## 2025-02-21 ENCOUNTER — NON-APPOINTMENT (OUTPATIENT)
Age: 70
End: 2025-02-21

## 2025-02-21 ENCOUNTER — APPOINTMENT (OUTPATIENT)
Dept: INFUSION THERAPY | Facility: HOSPITAL | Age: 70
End: 2025-02-21

## 2025-03-07 ENCOUNTER — RESULT REVIEW (OUTPATIENT)
Age: 70
End: 2025-03-07

## 2025-03-07 ENCOUNTER — APPOINTMENT (OUTPATIENT)
Dept: INFUSION THERAPY | Facility: HOSPITAL | Age: 70
End: 2025-03-07

## 2025-03-07 DIAGNOSIS — S06.5XAA TRAUMATIC SUBDURAL HEMORRHAGE WITH LOSS OF CONSCIOUSNESS STATUS UNKNOWN, INITIAL ENCOUNTER: ICD-10-CM

## 2025-03-07 LAB
BASOPHILS # BLD AUTO: 0.02 K/UL — SIGNIFICANT CHANGE UP (ref 0–0.2)
BASOPHILS NFR BLD AUTO: 0.3 % — SIGNIFICANT CHANGE UP (ref 0–2)
EOSINOPHIL # BLD AUTO: 0.05 K/UL — SIGNIFICANT CHANGE UP (ref 0–0.5)
EOSINOPHIL NFR BLD AUTO: 0.6 % — SIGNIFICANT CHANGE UP (ref 0–6)
HCT VFR BLD CALC: 29.2 % — LOW (ref 39–50)
HGB BLD-MCNC: 10.6 G/DL — LOW (ref 13–17)
IMM GRANULOCYTES NFR BLD AUTO: 0.4 % — SIGNIFICANT CHANGE UP (ref 0–0.9)
LYMPHOCYTES # BLD AUTO: 0.83 K/UL — LOW (ref 1–3.3)
LYMPHOCYTES # BLD AUTO: 10.6 % — LOW (ref 13–44)
MCHC RBC-ENTMCNC: 36.3 G/DL — HIGH (ref 32–36)
MCHC RBC-ENTMCNC: 40.3 PG — HIGH (ref 27–34)
MCV RBC AUTO: 111 FL — HIGH (ref 80–100)
MONOCYTES # BLD AUTO: 1.46 K/UL — HIGH (ref 0–0.9)
MONOCYTES NFR BLD AUTO: 18.6 % — HIGH (ref 2–14)
NEUTROPHILS # BLD AUTO: 5.47 K/UL — SIGNIFICANT CHANGE UP (ref 1.8–7.4)
NEUTROPHILS NFR BLD AUTO: 69.5 % — SIGNIFICANT CHANGE UP (ref 43–77)
NRBC BLD AUTO-RTO: 0 /100 WBCS — SIGNIFICANT CHANGE UP (ref 0–0)
PLATELET # BLD AUTO: 158 K/UL — SIGNIFICANT CHANGE UP (ref 150–400)
RBC # BLD: 2.63 M/UL — LOW (ref 4.2–5.8)
RBC # FLD: 17.1 % — HIGH (ref 10.3–14.5)
WBC # BLD: 7.86 K/UL — SIGNIFICANT CHANGE UP (ref 3.8–10.5)
WBC # FLD AUTO: 7.86 K/UL — SIGNIFICANT CHANGE UP (ref 3.8–10.5)

## 2025-03-27 ENCOUNTER — OUTPATIENT (OUTPATIENT)
Dept: OUTPATIENT SERVICES | Facility: HOSPITAL | Age: 70
LOS: 1 days | Discharge: HOME | End: 2025-03-27

## 2025-03-27 DIAGNOSIS — D64.9 ANEMIA, UNSPECIFIED: ICD-10-CM

## 2025-03-28 ENCOUNTER — APPOINTMENT (OUTPATIENT)
Dept: INFUSION THERAPY | Facility: HOSPITAL | Age: 70
End: 2025-03-28

## 2025-03-31 DIAGNOSIS — C20 MALIGNANT NEOPLASM OF RECTUM: ICD-10-CM

## 2025-03-31 DIAGNOSIS — Z51.11 ENCOUNTER FOR ANTINEOPLASTIC CHEMOTHERAPY: ICD-10-CM

## 2025-03-31 DIAGNOSIS — R11.2 NAUSEA WITH VOMITING, UNSPECIFIED: ICD-10-CM

## 2025-04-18 ENCOUNTER — APPOINTMENT (OUTPATIENT)
Dept: INFUSION THERAPY | Facility: HOSPITAL | Age: 70
End: 2025-04-18

## 2025-05-08 DIAGNOSIS — Z00.00 ENCOUNTER FOR GENERAL ADULT MEDICAL EXAMINATION W/OUT ABNORMAL FINDINGS: ICD-10-CM

## 2025-05-09 ENCOUNTER — RESULT REVIEW (OUTPATIENT)
Age: 70
End: 2025-05-09

## 2025-05-09 ENCOUNTER — APPOINTMENT (OUTPATIENT)
Dept: INFUSION THERAPY | Facility: HOSPITAL | Age: 70
End: 2025-05-09

## 2025-05-09 LAB
BASOPHILS # BLD AUTO: 0.07 K/UL — SIGNIFICANT CHANGE UP (ref 0–0.2)
BASOPHILS NFR BLD AUTO: 0.3 % — SIGNIFICANT CHANGE UP (ref 0–2)
EOSINOPHIL # BLD AUTO: 0.36 K/UL — SIGNIFICANT CHANGE UP (ref 0–0.5)
EOSINOPHIL NFR BLD AUTO: 1.5 % — SIGNIFICANT CHANGE UP (ref 0–6)
HCT VFR BLD CALC: 29.7 % — LOW (ref 39–50)
HGB BLD-MCNC: 10.8 G/DL — LOW (ref 13–17)
IMM GRANULOCYTES NFR BLD AUTO: 1.7 % — HIGH (ref 0–0.9)
LYMPHOCYTES # BLD AUTO: 0.89 K/UL — LOW (ref 1–3.3)
LYMPHOCYTES # BLD AUTO: 3.6 % — LOW (ref 13–44)
MCHC RBC-ENTMCNC: 36.4 G/DL — HIGH (ref 32–36)
MCHC RBC-ENTMCNC: 42.2 PG — HIGH (ref 27–34)
MCV RBC AUTO: 116 FL — HIGH (ref 80–100)
MONOCYTES # BLD AUTO: 2.62 K/UL — HIGH (ref 0–0.9)
MONOCYTES NFR BLD AUTO: 10.7 % — SIGNIFICANT CHANGE UP (ref 2–14)
NEUTROPHILS # BLD AUTO: 20.05 K/UL — HIGH (ref 1.8–7.4)
NEUTROPHILS NFR BLD AUTO: 82.2 % — HIGH (ref 43–77)
NRBC BLD AUTO-RTO: 0 /100 WBCS — SIGNIFICANT CHANGE UP (ref 0–0)
PLATELET # BLD AUTO: 235 K/UL — SIGNIFICANT CHANGE UP (ref 150–400)
RBC # BLD: 2.56 M/UL — LOW (ref 4.2–5.8)
RBC # FLD: 21.6 % — HIGH (ref 10.3–14.5)
WBC # BLD: 24.4 K/UL — HIGH (ref 3.8–10.5)
WBC # FLD AUTO: 24.4 K/UL — HIGH (ref 3.8–10.5)

## 2025-05-25 ENCOUNTER — OUTPATIENT (OUTPATIENT)
Dept: OUTPATIENT SERVICES | Facility: HOSPITAL | Age: 70
LOS: 1 days | Discharge: HOME | End: 2025-05-25

## 2025-05-25 DIAGNOSIS — D64.9 ANEMIA, UNSPECIFIED: ICD-10-CM

## 2025-05-30 ENCOUNTER — RESULT REVIEW (OUTPATIENT)
Age: 70
End: 2025-05-30

## 2025-05-30 ENCOUNTER — APPOINTMENT (OUTPATIENT)
Dept: INFUSION THERAPY | Facility: HOSPITAL | Age: 70
End: 2025-05-30

## 2025-05-30 DIAGNOSIS — S06.5XAA TRAUMATIC SUBDURAL HEMORRHAGE WITH LOSS OF CONSCIOUSNESS STATUS UNKNOWN, INITIAL ENCOUNTER: ICD-10-CM

## 2025-05-30 LAB
ANISOCYTOSIS BLD QL: SIGNIFICANT CHANGE UP
BASOPHILS # BLD AUTO: 0.05 K/UL — SIGNIFICANT CHANGE UP (ref 0–0.2)
BASOPHILS NFR BLD AUTO: 0.4 % — SIGNIFICANT CHANGE UP (ref 0–2)
BURR CELLS BLD QL SMEAR: PRESENT — SIGNIFICANT CHANGE UP
EOSINOPHIL # BLD AUTO: 0.25 K/UL — SIGNIFICANT CHANGE UP (ref 0–0.5)
EOSINOPHIL NFR BLD AUTO: 1.8 % — SIGNIFICANT CHANGE UP (ref 0–6)
HCT VFR BLD CALC: 27.9 % — LOW (ref 39–50)
HGB BLD-MCNC: 10.4 G/DL — LOW (ref 13–17)
IMM GRANULOCYTES NFR BLD AUTO: 4.6 % — HIGH (ref 0–0.9)
LYMPHOCYTES # BLD AUTO: 1.22 K/UL — SIGNIFICANT CHANGE UP (ref 1–3.3)
LYMPHOCYTES # BLD AUTO: 9 % — LOW (ref 13–44)
MACROCYTES BLD QL: SIGNIFICANT CHANGE UP
MCHC RBC-ENTMCNC: 37.3 G/DL — HIGH (ref 32–36)
MCHC RBC-ENTMCNC: 43.2 PG — HIGH (ref 27–34)
MCV RBC AUTO: 115.8 FL — HIGH (ref 80–100)
MONOCYTES # BLD AUTO: 4.35 K/UL — HIGH (ref 0–0.9)
MONOCYTES NFR BLD AUTO: 32.1 % — HIGH (ref 2–14)
NEUTROPHILS # BLD AUTO: 7.06 K/UL — SIGNIFICANT CHANGE UP (ref 1.8–7.4)
NEUTROPHILS NFR BLD AUTO: 52.1 % — SIGNIFICANT CHANGE UP (ref 43–77)
NRBC BLD AUTO-RTO: 0 /100 WBCS — SIGNIFICANT CHANGE UP (ref 0–0)
PLAT MORPH BLD: NORMAL — SIGNIFICANT CHANGE UP
PLATELET # BLD AUTO: 165 K/UL — SIGNIFICANT CHANGE UP (ref 150–400)
POIKILOCYTOSIS BLD QL AUTO: SLIGHT — SIGNIFICANT CHANGE UP
RBC # BLD: 2.41 M/UL — LOW (ref 4.2–5.8)
RBC # FLD: 23.8 % — HIGH (ref 10.3–14.5)
RBC BLD AUTO: ABNORMAL
WBC # BLD: 13.55 K/UL — HIGH (ref 3.8–10.5)
WBC # FLD AUTO: 13.55 K/UL — HIGH (ref 3.8–10.5)

## 2025-06-02 DIAGNOSIS — R11.2 NAUSEA WITH VOMITING, UNSPECIFIED: ICD-10-CM

## 2025-06-02 DIAGNOSIS — Z51.11 ENCOUNTER FOR ANTINEOPLASTIC CHEMOTHERAPY: ICD-10-CM

## 2025-06-02 DIAGNOSIS — C20 MALIGNANT NEOPLASM OF RECTUM: ICD-10-CM

## 2025-06-20 ENCOUNTER — APPOINTMENT (OUTPATIENT)
Dept: INFUSION THERAPY | Facility: HOSPITAL | Age: 70
End: 2025-06-20

## 2025-06-20 ENCOUNTER — NON-APPOINTMENT (OUTPATIENT)
Age: 70
End: 2025-06-20

## 2025-07-11 ENCOUNTER — NON-APPOINTMENT (OUTPATIENT)
Age: 70
End: 2025-07-11

## 2025-07-11 ENCOUNTER — APPOINTMENT (OUTPATIENT)
Dept: INFUSION THERAPY | Facility: HOSPITAL | Age: 70
End: 2025-07-11

## 2025-07-14 ENCOUNTER — NON-APPOINTMENT (OUTPATIENT)
Age: 70
End: 2025-07-14

## 2025-08-01 ENCOUNTER — APPOINTMENT (OUTPATIENT)
Dept: INFUSION THERAPY | Facility: HOSPITAL | Age: 70
End: 2025-08-01

## 2025-08-12 ENCOUNTER — INPATIENT (INPATIENT)
Facility: HOSPITAL | Age: 70
LOS: 2 days | Discharge: HOME | End: 2025-08-15
Attending: STUDENT IN AN ORGANIZED HEALTH CARE EDUCATION/TRAINING PROGRAM | Admitting: STUDENT IN AN ORGANIZED HEALTH CARE EDUCATION/TRAINING PROGRAM
Payer: MEDICARE

## 2025-08-12 VITALS
HEIGHT: 67 IN | WEIGHT: 104.94 LBS | RESPIRATION RATE: 18 BRPM | HEART RATE: 86 BPM | SYSTOLIC BLOOD PRESSURE: 130 MMHG | TEMPERATURE: 98 F | DIASTOLIC BLOOD PRESSURE: 91 MMHG | OXYGEN SATURATION: 98 %

## 2025-08-12 DIAGNOSIS — M79.89 OTHER SPECIFIED SOFT TISSUE DISORDERS: ICD-10-CM

## 2025-08-12 DIAGNOSIS — Z29.9 ENCOUNTER FOR PROPHYLACTIC MEASURES, UNSPECIFIED: ICD-10-CM

## 2025-08-12 DIAGNOSIS — R74.8 ABNORMAL LEVELS OF OTHER SERUM ENZYMES: ICD-10-CM

## 2025-08-12 DIAGNOSIS — R06.02 SHORTNESS OF BREATH: ICD-10-CM

## 2025-08-12 DIAGNOSIS — R18.8 OTHER ASCITES: ICD-10-CM

## 2025-08-12 DIAGNOSIS — R62.7 ADULT FAILURE TO THRIVE: ICD-10-CM

## 2025-08-12 DIAGNOSIS — K83.8 OTHER SPECIFIED DISEASES OF BILIARY TRACT: ICD-10-CM

## 2025-08-12 DIAGNOSIS — Z93.9 ARTIFICIAL OPENING STATUS, UNSPECIFIED: Chronic | ICD-10-CM

## 2025-08-12 DIAGNOSIS — D64.9 ANEMIA, UNSPECIFIED: ICD-10-CM

## 2025-08-12 DIAGNOSIS — C20 MALIGNANT NEOPLASM OF RECTUM: ICD-10-CM

## 2025-08-12 DIAGNOSIS — Z93.9 ARTIFICIAL OPENING STATUS, UNSPECIFIED: ICD-10-CM

## 2025-08-12 DIAGNOSIS — R14.0 ABDOMINAL DISTENSION (GASEOUS): ICD-10-CM

## 2025-08-12 LAB
ADD ON TEST-SPECIMEN IN LAB: SIGNIFICANT CHANGE UP
AGGLUTINATION: PRESENT
ALBUMIN FLD-MCNC: 0.5 G/DL — SIGNIFICANT CHANGE UP
ALBUMIN SERPL ELPH-MCNC: 2.4 G/DL — LOW (ref 3.3–5)
ALP SERPL-CCNC: 253 U/L — HIGH (ref 40–120)
ALT FLD-CCNC: 14 U/L — SIGNIFICANT CHANGE UP (ref 4–41)
ANION GAP SERPL CALC-SCNC: 11 MMOL/L — SIGNIFICANT CHANGE UP (ref 7–14)
ANISOCYTOSIS BLD QL: SLIGHT — SIGNIFICANT CHANGE UP
APPEARANCE UR: CLEAR — SIGNIFICANT CHANGE UP
APTT BLD: 33.9 SEC — SIGNIFICANT CHANGE UP (ref 26.1–36.8)
AST SERPL-CCNC: 48 U/L — HIGH (ref 4–40)
B PERT IGG+IGM PNL SER: ABNORMAL
BACTERIA # UR AUTO: NEGATIVE /HPF — SIGNIFICANT CHANGE UP
BASOPHILS # BLD AUTO: 0.04 K/UL — SIGNIFICANT CHANGE UP (ref 0–0.2)
BASOPHILS # BLD MANUAL: 0.07 K/UL — SIGNIFICANT CHANGE UP (ref 0–0.2)
BASOPHILS NFR BLD AUTO: 0.5 % — SIGNIFICANT CHANGE UP (ref 0–2)
BASOPHILS NFR BLD MANUAL: 0.8 % — SIGNIFICANT CHANGE UP (ref 0–2)
BILIRUB DIRECT SERPL-MCNC: 0.4 MG/DL — HIGH (ref 0–0.3)
BILIRUB INDIRECT FLD-MCNC: 0.9 MG/DL — SIGNIFICANT CHANGE UP (ref 0–1)
BILIRUB SERPL-MCNC: 1.3 MG/DL — HIGH (ref 0.2–1.2)
BILIRUB SERPL-MCNC: 1.3 MG/DL — HIGH (ref 0.2–1.2)
BILIRUB UR-MCNC: NEGATIVE — SIGNIFICANT CHANGE UP
BLD GP AB SCN SERPL QL: NEGATIVE — SIGNIFICANT CHANGE UP
BLOOD GAS VENOUS COMPREHENSIVE RESULT: SIGNIFICANT CHANGE UP
BUN SERPL-MCNC: 22 MG/DL — SIGNIFICANT CHANGE UP (ref 7–23)
BURR CELLS BLD QL SMEAR: ABNORMAL
CALCIUM SERPL-MCNC: 8.3 MG/DL — LOW (ref 8.4–10.5)
CAST: 5 /LPF — HIGH (ref 0–4)
CHLORIDE SERPL-SCNC: 99 MMOL/L — SIGNIFICANT CHANGE UP (ref 98–107)
CO2 SERPL-SCNC: 24 MMOL/L — SIGNIFICANT CHANGE UP (ref 22–31)
COLOR FLD: YELLOW
COLOR SPEC: SIGNIFICANT CHANGE UP
CREAT SERPL-MCNC: 0.55 MG/DL — SIGNIFICANT CHANGE UP (ref 0.5–1.3)
DIFF PNL FLD: ABNORMAL
EGFR: 106 ML/MIN/1.73M2 — SIGNIFICANT CHANGE UP
EGFR: 106 ML/MIN/1.73M2 — SIGNIFICANT CHANGE UP
EOSINOPHIL # BLD AUTO: 0.01 K/UL — SIGNIFICANT CHANGE UP (ref 0–0.5)
EOSINOPHIL # BLD MANUAL: 0 K/UL — SIGNIFICANT CHANGE UP (ref 0–0.5)
EOSINOPHIL # FLD: 0 % — SIGNIFICANT CHANGE UP
EOSINOPHIL NFR BLD AUTO: 0.1 % — SIGNIFICANT CHANGE UP (ref 0–6)
EOSINOPHIL NFR BLD MANUAL: 0 % — SIGNIFICANT CHANGE UP (ref 0–6)
FLUAV AG NPH QL: SIGNIFICANT CHANGE UP
FLUBV AG NPH QL: SIGNIFICANT CHANGE UP
FLUID INTAKE SUBSTANCE CLASS: SIGNIFICANT CHANGE UP
FOLATE+VIT B12 SERBLD-IMP: 0 % — SIGNIFICANT CHANGE UP
GIANT PLATELETS BLD QL SMEAR: PRESENT
GLUCOSE FLD-MCNC: 122 MG/DL — SIGNIFICANT CHANGE UP
GLUCOSE SERPL-MCNC: 115 MG/DL — HIGH (ref 70–99)
GLUCOSE UR QL: NEGATIVE MG/DL — SIGNIFICANT CHANGE UP
HCT VFR BLD CALC: 29.7 % — LOW (ref 39–50)
HGB BLD-MCNC: 10.5 G/DL — LOW (ref 13–17)
HYALINE CASTS # UR AUTO: PRESENT
IMM GRANULOCYTES # BLD AUTO: 0.07 K/UL — SIGNIFICANT CHANGE UP (ref 0–0.07)
IMM GRANULOCYTES NFR BLD AUTO: 0.8 % — SIGNIFICANT CHANGE UP (ref 0–0.9)
INR BLD: 1.29 RATIO — HIGH (ref 0.85–1.16)
KETONES UR QL: NEGATIVE MG/DL — SIGNIFICANT CHANGE UP
LDH SERPL L TO P-CCNC: 54 U/L — SIGNIFICANT CHANGE UP
LEUKOCYTE ESTERASE UR-ACNC: NEGATIVE — SIGNIFICANT CHANGE UP
LIDOCAIN IGE QN: 43 U/L — SIGNIFICANT CHANGE UP (ref 7–60)
LYMPHOCYTES # BLD AUTO: 0.42 K/UL — LOW (ref 1–3.3)
LYMPHOCYTES # BLD MANUAL: 0.43 K/UL — LOW (ref 1–3.3)
LYMPHOCYTES # FLD: 22 % — SIGNIFICANT CHANGE UP
LYMPHOCYTES NFR BLD AUTO: 4.9 % — LOW (ref 13–44)
LYMPHOCYTES NFR BLD MANUAL: 5 % — LOW (ref 13–44)
MACROCYTES BLD QL: SLIGHT — SIGNIFICANT CHANGE UP
MAGNESIUM SERPL-MCNC: 2.1 MG/DL — SIGNIFICANT CHANGE UP (ref 1.6–2.6)
MANUAL NEUTROPHIL BANDS #: 0.07 K/UL — SIGNIFICANT CHANGE UP (ref 0–0.84)
MCHC RBC-ENTMCNC: 35.4 G/DL — SIGNIFICANT CHANGE UP (ref 32–36)
MCHC RBC-ENTMCNC: 41.5 PG — HIGH (ref 27–34)
MCV RBC AUTO: 117.4 FL — HIGH (ref 80–100)
MESOTHL CELL # FLD: 0 % — SIGNIFICANT CHANGE UP
MONOCYTES # BLD AUTO: 1.7 K/UL — HIGH (ref 0–0.9)
MONOCYTES # BLD MANUAL: 1.63 K/UL — HIGH (ref 0–0.9)
MONOCYTES NFR BLD AUTO: 19.8 % — HIGH (ref 2–14)
MONOCYTES NFR BLD MANUAL: 19 % — HIGH (ref 2–14)
MONOS+MACROS # FLD: 54 % — SIGNIFICANT CHANGE UP
NEUTROPHILS # BLD AUTO: 6.36 K/UL — SIGNIFICANT CHANGE UP (ref 1.8–7.4)
NEUTROPHILS # BLD MANUAL: 6.4 K/UL — SIGNIFICANT CHANGE UP (ref 1.8–7.4)
NEUTROPHILS NFR BLD AUTO: 73.9 % — SIGNIFICANT CHANGE UP (ref 43–77)
NEUTROPHILS NFR BLD MANUAL: 74.4 % — SIGNIFICANT CHANGE UP (ref 43–77)
NEUTROPHILS-BODY FLUID: 24 % — SIGNIFICANT CHANGE UP
NEUTS BAND # BLD: 0.8 % — SIGNIFICANT CHANGE UP (ref 0–8)
NEUTS BAND NFR BLD: 0.8 % — SIGNIFICANT CHANGE UP (ref 0–8)
NITRITE UR-MCNC: NEGATIVE — SIGNIFICANT CHANGE UP
NRBC # BLD AUTO: 0 K/UL — SIGNIFICANT CHANGE UP (ref 0–0)
NRBC # FLD: 0 K/UL — SIGNIFICANT CHANGE UP (ref 0–0)
NRBC BLD AUTO-RTO: 0 /100 WBCS — SIGNIFICANT CHANGE UP (ref 0–0)
NT-PROBNP SERPL-SCNC: 293 PG/ML — SIGNIFICANT CHANGE UP
OTHER CELLS FLD MANUAL: 0 % — SIGNIFICANT CHANGE UP
PH UR: 6.5 — SIGNIFICANT CHANGE UP (ref 5–8)
PHOSPHATE SERPL-MCNC: 3 MG/DL — SIGNIFICANT CHANGE UP (ref 2.5–4.5)
PLAT MORPH BLD: ABNORMAL
PLATELET # BLD AUTO: 182 K/UL — SIGNIFICANT CHANGE UP (ref 150–400)
PLATELET COUNT - ESTIMATE: NORMAL — SIGNIFICANT CHANGE UP
PMV BLD: 9.6 FL — SIGNIFICANT CHANGE UP (ref 7–13)
POIKILOCYTOSIS BLD QL AUTO: SLIGHT — SIGNIFICANT CHANGE UP
POLYCHROMASIA BLD QL SMEAR: SLIGHT — SIGNIFICANT CHANGE UP
POTASSIUM SERPL-MCNC: 3.6 MMOL/L — SIGNIFICANT CHANGE UP (ref 3.5–5.3)
POTASSIUM SERPL-SCNC: 3.6 MMOL/L — SIGNIFICANT CHANGE UP (ref 3.5–5.3)
PROT FLD-MCNC: 1 G/DL — SIGNIFICANT CHANGE UP
PROT SERPL-MCNC: 6.8 G/DL — SIGNIFICANT CHANGE UP (ref 6–8.3)
PROT UR-MCNC: 30 MG/DL
PROTHROM AB SERPL-ACNC: 14.9 SEC — HIGH (ref 9.9–13.4)
RBC # BLD: 2.53 M/UL — LOW (ref 4.2–5.8)
RBC # FLD: 19.9 % — HIGH (ref 10.3–14.5)
RBC BLD AUTO: ABNORMAL
RBC CASTS # UR COMP ASSIST: 54 /HPF — HIGH (ref 0–4)
RCV VOL RI: <2000 CELLS/UL — HIGH
REVIEW: SIGNIFICANT CHANGE UP
RSV RNA NPH QL NAA+NON-PROBE: SIGNIFICANT CHANGE UP
SARS-COV-2 RNA SPEC QL NAA+PROBE: SIGNIFICANT CHANGE UP
SCHISTOCYTES BLD QL AUTO: SLIGHT — SIGNIFICANT CHANGE UP
SODIUM SERPL-SCNC: 134 MMOL/L — LOW (ref 135–145)
SOURCE RESPIRATORY: SIGNIFICANT CHANGE UP
SP GR SPEC: 1.06 — HIGH (ref 1–1.03)
SQUAMOUS # UR AUTO: 1 /HPF — SIGNIFICANT CHANGE UP (ref 0–5)
TOTAL CELLS COUNTED, BODY FLUID: 100 CELLS — SIGNIFICANT CHANGE UP
TOTAL NUCLEATED CELL COUNT, BODY FLUID: 76 CELLS/UL — SIGNIFICANT CHANGE UP
TROPONIN T, HIGH SENSITIVITY RESULT: 24 NG/L — SIGNIFICANT CHANGE UP
TUBE TYPE: SIGNIFICANT CHANGE UP
UROBILINOGEN FLD QL: 1 MG/DL — SIGNIFICANT CHANGE UP (ref 0.2–1)
WBC # BLD: 8.6 K/UL — SIGNIFICANT CHANGE UP (ref 3.8–10.5)
WBC # FLD AUTO: 8.6 K/UL — SIGNIFICANT CHANGE UP (ref 3.8–10.5)
WBC COUNT.: 61 CELLS/UL — SIGNIFICANT CHANGE UP
WBC UR QL: 1 /HPF — SIGNIFICANT CHANGE UP (ref 0–5)

## 2025-08-12 PROCEDURE — 73562 X-RAY EXAM OF KNEE 3: CPT | Mod: 26,LT

## 2025-08-12 PROCEDURE — 74177 CT ABD & PELVIS W/CONTRAST: CPT | Mod: 26

## 2025-08-12 PROCEDURE — 76705 ECHO EXAM OF ABDOMEN: CPT | Mod: 26

## 2025-08-12 PROCEDURE — 71045 X-RAY EXAM CHEST 1 VIEW: CPT | Mod: 26

## 2025-08-12 PROCEDURE — 99285 EMERGENCY DEPT VISIT HI MDM: CPT | Mod: 25

## 2025-08-12 PROCEDURE — 76942 ECHO GUIDE FOR BIOPSY: CPT | Mod: 26,59

## 2025-08-12 PROCEDURE — 99223 1ST HOSP IP/OBS HIGH 75: CPT | Mod: AI

## 2025-08-12 PROCEDURE — 70450 CT HEAD/BRAIN W/O DYE: CPT | Mod: 26

## 2025-08-12 PROCEDURE — 36410 VNPNXR 3YR/> PHY/QHP DX/THER: CPT | Mod: 59

## 2025-08-12 PROCEDURE — 49083 ABD PARACENTESIS W/IMAGING: CPT

## 2025-08-12 PROCEDURE — 71275 CT ANGIOGRAPHY CHEST: CPT | Mod: 26

## 2025-08-12 PROCEDURE — 93970 EXTREMITY STUDY: CPT | Mod: 26

## 2025-08-12 RX ORDER — ACETAMINOPHEN 500 MG/5ML
650 LIQUID (ML) ORAL EVERY 6 HOURS
Refills: 0 | Status: DISCONTINUED | OUTPATIENT
Start: 2025-08-12 | End: 2025-08-15

## 2025-08-12 RX ORDER — LIDOCAINE HCL/EPINEPHRINE/PF 1 %-1:200K
10 AMPUL (ML) INJECTION ONCE
Refills: 0 | Status: COMPLETED | OUTPATIENT
Start: 2025-08-12 | End: 2025-08-12

## 2025-08-12 RX ORDER — CEFTRIAXONE 500 MG/1
2000 INJECTION, POWDER, FOR SOLUTION INTRAMUSCULAR; INTRAVENOUS ONCE
Refills: 0 | Status: COMPLETED | OUTPATIENT
Start: 2025-08-12 | End: 2025-08-12

## 2025-08-12 RX ORDER — B1/B2/B3/B5/B6/B12/VIT C/FOLIC 500-0.5 MG
1 TABLET ORAL DAILY
Refills: 0 | Status: DISCONTINUED | OUTPATIENT
Start: 2025-08-12 | End: 2025-08-15

## 2025-08-12 RX ORDER — LEVOTHYROXINE SODIUM 300 MCG
1 TABLET ORAL
Refills: 0 | DISCHARGE

## 2025-08-12 RX ADMIN — CEFTRIAXONE 100 MILLIGRAM(S): 500 INJECTION, POWDER, FOR SOLUTION INTRAMUSCULAR; INTRAVENOUS at 14:59

## 2025-08-12 RX ADMIN — Medication 10 MILLILITER(S): at 14:04

## 2025-08-13 ENCOUNTER — TRANSCRIPTION ENCOUNTER (OUTPATIENT)
Age: 70
End: 2025-08-13

## 2025-08-13 LAB
ADD ON TEST-SPECIMEN IN LAB: SIGNIFICANT CHANGE UP
ALBUMIN SERPL ELPH-MCNC: 2.4 G/DL — LOW (ref 3.3–5)
ALP SERPL-CCNC: 251 U/L — HIGH (ref 40–120)
ALT FLD-CCNC: 14 U/L — SIGNIFICANT CHANGE UP (ref 4–41)
ANION GAP SERPL CALC-SCNC: 10 MMOL/L — SIGNIFICANT CHANGE UP (ref 7–14)
APTT BLD: 34.3 SEC — SIGNIFICANT CHANGE UP (ref 26.1–36.8)
AST SERPL-CCNC: 42 U/L — HIGH (ref 4–40)
BASOPHILS # BLD AUTO: 0.04 K/UL — SIGNIFICANT CHANGE UP (ref 0–0.2)
BASOPHILS NFR BLD AUTO: 0.4 % — SIGNIFICANT CHANGE UP (ref 0–2)
BILIRUB SERPL-MCNC: 1.3 MG/DL — HIGH (ref 0.2–1.2)
BLD GP AB SCN SERPL QL: NEGATIVE — SIGNIFICANT CHANGE UP
BUN SERPL-MCNC: 21 MG/DL — SIGNIFICANT CHANGE UP (ref 7–23)
CALCIUM SERPL-MCNC: 8.4 MG/DL — SIGNIFICANT CHANGE UP (ref 8.4–10.5)
CHLORIDE SERPL-SCNC: 104 MMOL/L — SIGNIFICANT CHANGE UP (ref 98–107)
CO2 SERPL-SCNC: 25 MMOL/L — SIGNIFICANT CHANGE UP (ref 22–31)
CREAT SERPL-MCNC: 0.57 MG/DL — SIGNIFICANT CHANGE UP (ref 0.5–1.3)
DAT POLY-SP REAG RBC QL: POSITIVE — SIGNIFICANT CHANGE UP
EGFR: 105 ML/MIN/1.73M2 — SIGNIFICANT CHANGE UP
EGFR: 105 ML/MIN/1.73M2 — SIGNIFICANT CHANGE UP
EOSINOPHIL # BLD AUTO: 0.03 K/UL — SIGNIFICANT CHANGE UP (ref 0–0.5)
EOSINOPHIL NFR BLD AUTO: 0.3 % — SIGNIFICANT CHANGE UP (ref 0–6)
FOLATE SERPL-MCNC: 8.9 NG/ML — SIGNIFICANT CHANGE UP (ref 3.1–17.5)
GLUCOSE SERPL-MCNC: 94 MG/DL — SIGNIFICANT CHANGE UP (ref 70–99)
GRAM STN FLD: SIGNIFICANT CHANGE UP
HAPTOGLOB SERPL-MCNC: 91 MG/DL — SIGNIFICANT CHANGE UP (ref 34–200)
HCT VFR BLD CALC: 30.2 % — LOW (ref 39–50)
HGB BLD-MCNC: 10.2 G/DL — LOW (ref 13–17)
IMM GRANULOCYTES # BLD AUTO: 0.04 K/UL — SIGNIFICANT CHANGE UP (ref 0–0.07)
IMM GRANULOCYTES NFR BLD AUTO: 0.4 % — SIGNIFICANT CHANGE UP (ref 0–0.9)
INR BLD: 1.25 RATIO — HIGH (ref 0.85–1.16)
LACTATE SERPL-SCNC: 1.7 MMOL/L — SIGNIFICANT CHANGE UP (ref 0.5–2)
LDH SERPL L TO P-CCNC: 345 U/L — HIGH (ref 135–225)
LYMPHOCYTES # BLD AUTO: 0.52 K/UL — LOW (ref 1–3.3)
LYMPHOCYTES NFR BLD AUTO: 5.5 % — LOW (ref 13–44)
MAGNESIUM SERPL-MCNC: 2 MG/DL — SIGNIFICANT CHANGE UP (ref 1.6–2.6)
MCHC RBC-ENTMCNC: 33.8 G/DL — SIGNIFICANT CHANGE UP (ref 32–36)
MCHC RBC-ENTMCNC: 39.8 PG — HIGH (ref 27–34)
MCV RBC AUTO: 118 FL — HIGH (ref 80–100)
MONOCYTES # BLD AUTO: 1.64 K/UL — HIGH (ref 0–0.9)
MONOCYTES NFR BLD AUTO: 17.5 % — HIGH (ref 2–14)
MRSA PCR RESULT.: SIGNIFICANT CHANGE UP
NEUTROPHILS # BLD AUTO: 7.11 K/UL — SIGNIFICANT CHANGE UP (ref 1.8–7.4)
NEUTROPHILS NFR BLD AUTO: 75.9 % — SIGNIFICANT CHANGE UP (ref 43–77)
NRBC # BLD AUTO: 0 K/UL — SIGNIFICANT CHANGE UP (ref 0–0)
NRBC # FLD: 0 K/UL — SIGNIFICANT CHANGE UP (ref 0–0)
NRBC BLD AUTO-RTO: 0 /100 WBCS — SIGNIFICANT CHANGE UP (ref 0–0)
PHOSPHATE SERPL-MCNC: 3.1 MG/DL — SIGNIFICANT CHANGE UP (ref 2.5–4.5)
PLATELET # BLD AUTO: 166 K/UL — SIGNIFICANT CHANGE UP (ref 150–400)
PMV BLD: 10.2 FL — SIGNIFICANT CHANGE UP (ref 7–13)
POTASSIUM SERPL-MCNC: 3.7 MMOL/L — SIGNIFICANT CHANGE UP (ref 3.5–5.3)
POTASSIUM SERPL-SCNC: 3.7 MMOL/L — SIGNIFICANT CHANGE UP (ref 3.5–5.3)
PROT SERPL-MCNC: 6.7 G/DL — SIGNIFICANT CHANGE UP (ref 6–8.3)
PROTHROM AB SERPL-ACNC: 14.8 SEC — HIGH (ref 9.9–13.4)
RBC # BLD: 2.56 M/UL — LOW (ref 4.2–5.8)
RBC # FLD: 19.7 % — HIGH (ref 10.3–14.5)
S AUREUS DNA NOSE QL NAA+PROBE: SIGNIFICANT CHANGE UP
SODIUM SERPL-SCNC: 139 MMOL/L — SIGNIFICANT CHANGE UP (ref 135–145)
SPECIMEN SOURCE: SIGNIFICANT CHANGE UP
T4 FREE SERPL-MCNC: 1.1 NG/DL — SIGNIFICANT CHANGE UP (ref 0.9–1.8)
TSH SERPL-MCNC: 1.12 UIU/ML — SIGNIFICANT CHANGE UP (ref 0.27–4.2)
VIT B12 SERPL-MCNC: >2000 PG/ML — HIGH (ref 200–900)
WBC # BLD: 9.38 K/UL — SIGNIFICANT CHANGE UP (ref 3.8–10.5)
WBC # FLD AUTO: 9.38 K/UL — SIGNIFICANT CHANGE UP (ref 3.8–10.5)

## 2025-08-13 PROCEDURE — 99497 ADVNCD CARE PLAN 30 MIN: CPT

## 2025-08-13 PROCEDURE — 86077 PHYS BLOOD BANK SERV XMATCH: CPT

## 2025-08-13 PROCEDURE — 99232 SBSQ HOSP IP/OBS MODERATE 35: CPT

## 2025-08-13 RX ADMIN — Medication 1 APPLICATION(S): at 05:45

## 2025-08-13 RX ADMIN — Medication 40 MILLIGRAM(S): at 05:46

## 2025-08-13 RX ADMIN — Medication 1 TABLET(S): at 12:10

## 2025-08-14 ENCOUNTER — TRANSCRIPTION ENCOUNTER (OUTPATIENT)
Age: 70
End: 2025-08-14

## 2025-08-14 PROBLEM — C20 MALIGNANT NEOPLASM OF RECTUM: Chronic | Status: ACTIVE | Noted: 2025-08-12

## 2025-08-14 LAB
ADD ON TEST-SPECIMEN IN LAB: SIGNIFICANT CHANGE UP
ANION GAP SERPL CALC-SCNC: 9 MMOL/L — SIGNIFICANT CHANGE UP (ref 7–14)
APTT BLD: 36.3 SEC — SIGNIFICANT CHANGE UP (ref 26.1–36.8)
BILIRUB DIRECT SERPL-MCNC: 0.4 MG/DL — HIGH (ref 0–0.3)
BILIRUB INDIRECT FLD-MCNC: 0.5 MG/DL — SIGNIFICANT CHANGE UP (ref 0–1)
BILIRUB SERPL-MCNC: 0.9 MG/DL — SIGNIFICANT CHANGE UP (ref 0.2–1.2)
BLD GP AB SCN SERPL QL: NEGATIVE — SIGNIFICANT CHANGE UP
BUN SERPL-MCNC: 23 MG/DL — SIGNIFICANT CHANGE UP (ref 7–23)
CALCIUM SERPL-MCNC: 8.2 MG/DL — LOW (ref 8.4–10.5)
CHLORIDE SERPL-SCNC: 105 MMOL/L — SIGNIFICANT CHANGE UP (ref 98–107)
CO2 SERPL-SCNC: 26 MMOL/L — SIGNIFICANT CHANGE UP (ref 22–31)
CREAT SERPL-MCNC: 0.61 MG/DL — SIGNIFICANT CHANGE UP (ref 0.5–1.3)
EGFR: 103 ML/MIN/1.73M2 — SIGNIFICANT CHANGE UP
EGFR: 103 ML/MIN/1.73M2 — SIGNIFICANT CHANGE UP
GLUCOSE SERPL-MCNC: 95 MG/DL — SIGNIFICANT CHANGE UP (ref 70–99)
HCT VFR BLD CALC: 29.7 % — LOW (ref 39–50)
HGB BLD-MCNC: 9.8 G/DL — LOW (ref 13–17)
INR BLD: 1.23 RATIO — HIGH (ref 0.85–1.16)
MAGNESIUM SERPL-MCNC: 2 MG/DL — SIGNIFICANT CHANGE UP (ref 1.6–2.6)
MCHC RBC-ENTMCNC: 33 G/DL — SIGNIFICANT CHANGE UP (ref 32–36)
MCHC RBC-ENTMCNC: 39.4 PG — HIGH (ref 27–34)
MCV RBC AUTO: 119.3 FL — HIGH (ref 80–100)
NRBC # BLD AUTO: 0 K/UL — SIGNIFICANT CHANGE UP (ref 0–0)
NRBC # FLD: 0 K/UL — SIGNIFICANT CHANGE UP (ref 0–0)
NRBC BLD AUTO-RTO: 0 /100 WBCS — SIGNIFICANT CHANGE UP (ref 0–0)
PHOSPHATE SERPL-MCNC: 2.8 MG/DL — SIGNIFICANT CHANGE UP (ref 2.5–4.5)
PLATELET # BLD AUTO: 159 K/UL — SIGNIFICANT CHANGE UP (ref 150–400)
PMV BLD: 10.1 FL — SIGNIFICANT CHANGE UP (ref 7–13)
POTASSIUM SERPL-MCNC: 3.5 MMOL/L — SIGNIFICANT CHANGE UP (ref 3.5–5.3)
POTASSIUM SERPL-SCNC: 3.5 MMOL/L — SIGNIFICANT CHANGE UP (ref 3.5–5.3)
PROTHROM AB SERPL-ACNC: 14.2 SEC — HIGH (ref 9.9–13.4)
RBC # BLD: 2.49 M/UL — LOW (ref 4.2–5.8)
RBC # FLD: 19.4 % — HIGH (ref 10.3–14.5)
RH IG SCN BLD-IMP: POSITIVE — SIGNIFICANT CHANGE UP
SODIUM SERPL-SCNC: 140 MMOL/L — SIGNIFICANT CHANGE UP (ref 135–145)
WBC # BLD: 8.88 K/UL — SIGNIFICANT CHANGE UP (ref 3.8–10.5)
WBC # FLD AUTO: 8.88 K/UL — SIGNIFICANT CHANGE UP (ref 3.8–10.5)

## 2025-08-14 PROCEDURE — 49083 ABD PARACENTESIS W/IMAGING: CPT

## 2025-08-14 PROCEDURE — 99232 SBSQ HOSP IP/OBS MODERATE 35: CPT

## 2025-08-14 RX ORDER — ALBUMIN (HUMAN) 12.5 G/50ML
200 INJECTION, SOLUTION INTRAVENOUS ONCE
Refills: 0 | Status: COMPLETED | OUTPATIENT
Start: 2025-08-14 | End: 2025-08-14

## 2025-08-14 RX ADMIN — ALBUMIN (HUMAN) 200 MILLILITER(S): 12.5 INJECTION, SOLUTION INTRAVENOUS at 18:45

## 2025-08-14 RX ADMIN — Medication 1 TABLET(S): at 11:24

## 2025-08-14 RX ADMIN — Medication 40 MILLIGRAM(S): at 05:22

## 2025-08-14 RX ADMIN — Medication 1 APPLICATION(S): at 05:25

## 2025-08-15 VITALS
RESPIRATION RATE: 17 BRPM | SYSTOLIC BLOOD PRESSURE: 129 MMHG | OXYGEN SATURATION: 96 % | TEMPERATURE: 98 F | DIASTOLIC BLOOD PRESSURE: 73 MMHG | HEART RATE: 82 BPM

## 2025-08-15 PROCEDURE — 99239 HOSP IP/OBS DSCHRG MGMT >30: CPT

## 2025-08-15 RX ADMIN — Medication 1 APPLICATION(S): at 05:06

## 2025-08-15 RX ADMIN — Medication 1 TABLET(S): at 11:16

## 2025-08-15 RX ADMIN — Medication 40 MILLIGRAM(S): at 05:04

## 2025-08-17 LAB
CULTURE RESULTS: SIGNIFICANT CHANGE UP
SPECIMEN SOURCE: SIGNIFICANT CHANGE UP

## 2025-08-21 ENCOUNTER — APPOINTMENT (OUTPATIENT)
Dept: CARE COORDINATION | Facility: HOME HEALTH | Age: 70
End: 2025-08-21
Payer: MEDICARE

## 2025-08-21 VITALS
OXYGEN SATURATION: 97 % | SYSTOLIC BLOOD PRESSURE: 122 MMHG | WEIGHT: 105 LBS | BODY MASS INDEX: 16.1 KG/M2 | HEIGHT: 67.8 IN | DIASTOLIC BLOOD PRESSURE: 74 MMHG | HEART RATE: 86 BPM

## 2025-08-21 DIAGNOSIS — C78.00 MALIGNANT NEOPLASM OF RECTUM: ICD-10-CM

## 2025-08-21 DIAGNOSIS — Z87.898 PERSONAL HISTORY OF OTHER SPECIFIED CONDITIONS: ICD-10-CM

## 2025-08-21 DIAGNOSIS — C20 MALIGNANT NEOPLASM OF RECTUM: ICD-10-CM

## 2025-08-21 DIAGNOSIS — R18.0 MALIGNANT ASCITES: ICD-10-CM

## 2025-08-21 DIAGNOSIS — C78.7 MALIGNANT NEOPLASM OF RECTUM: ICD-10-CM

## 2025-08-21 DIAGNOSIS — R64 CACHEXIA: ICD-10-CM

## 2025-08-21 PROCEDURE — 99349 HOME/RES VST EST MOD MDM 40: CPT | Mod: 2W

## 2025-08-22 ENCOUNTER — APPOINTMENT (OUTPATIENT)
Dept: INFUSION THERAPY | Facility: HOSPITAL | Age: 70
End: 2025-08-22

## 2025-08-22 PROBLEM — R18.0 MALIGNANT ASCITES: Status: ACTIVE | Noted: 2025-08-22

## 2025-08-22 PROBLEM — Z87.898 HISTORY OF ASCITES: Status: RESOLVED | Noted: 2025-08-22 | Resolved: 2025-08-22

## 2025-08-22 PROBLEM — C20 RECTAL CANCER METASTASIZED TO LIVER: Status: ACTIVE | Noted: 2025-08-22

## 2025-08-22 PROBLEM — R64 CANCER CACHEXIA: Status: ACTIVE | Noted: 2025-08-22

## 2025-08-22 PROBLEM — C20 RECTAL CANCER METASTASIZED TO LUNG: Status: ACTIVE | Noted: 2025-08-22

## 2025-08-26 ENCOUNTER — APPOINTMENT (OUTPATIENT)
Dept: INTERNAL MEDICINE | Facility: CLINIC | Age: 70
End: 2025-08-26

## 2025-09-05 ENCOUNTER — TRANSCRIPTION ENCOUNTER (OUTPATIENT)
Age: 70
End: 2025-09-05

## 2025-09-09 ENCOUNTER — TRANSCRIPTION ENCOUNTER (OUTPATIENT)
Age: 70
End: 2025-09-09

## 2025-09-09 ENCOUNTER — RESULT REVIEW (OUTPATIENT)
Age: 70
End: 2025-09-09

## 2025-09-10 ENCOUNTER — APPOINTMENT (OUTPATIENT)
Age: 70
End: 2025-09-10
Payer: MEDICARE

## 2025-09-10 VITALS
DIASTOLIC BLOOD PRESSURE: 73 MMHG | TEMPERATURE: 98 F | HEART RATE: 88 BPM | RESPIRATION RATE: 16 BRPM | SYSTOLIC BLOOD PRESSURE: 103 MMHG | OXYGEN SATURATION: 95 %

## 2025-09-10 DIAGNOSIS — C20 MALIGNANT NEOPLASM OF RECTUM: ICD-10-CM

## 2025-09-10 DIAGNOSIS — R06.02 SHORTNESS OF BREATH: ICD-10-CM

## 2025-09-10 DIAGNOSIS — N13.9 OBSTRUCTIVE AND REFLUX UROPATHY, UNSPECIFIED: ICD-10-CM

## 2025-09-10 DIAGNOSIS — C78.7 MALIGNANT NEOPLASM OF RECTUM: ICD-10-CM

## 2025-09-10 PROBLEM — K21.9 GASTROESOPHAGEAL REFLUX DISEASE: Status: ACTIVE | Noted: 2020-08-26

## 2025-09-10 PROCEDURE — 99205 OFFICE O/P NEW HI 60 MIN: CPT

## 2025-09-10 PROCEDURE — G2211 COMPLEX E/M VISIT ADD ON: CPT

## 2025-09-10 RX ORDER — FAMOTIDINE 20 MG/1
20 TABLET, FILM COATED ORAL
Qty: 30 | Refills: 0 | Status: ACTIVE | COMMUNITY
Start: 2025-09-10

## 2025-09-10 RX ORDER — TAMSULOSIN HYDROCHLORIDE 0.4 MG/1
0.4 CAPSULE ORAL DAILY
Qty: 30 | Refills: 3 | Status: ACTIVE | COMMUNITY
Start: 2025-09-10 | End: 1900-01-01

## 2025-09-10 RX ORDER — PANTOPRAZOLE 40 MG/1
40 TABLET, DELAYED RELEASE ORAL DAILY
Qty: 30 | Refills: 3 | Status: ACTIVE | COMMUNITY
Start: 2025-09-10 | End: 1900-01-01

## 2025-09-10 RX ORDER — IPRATROPIUM BROMIDE AND ALBUTEROL SULFATE 2.5; .5 MG/3ML; MG/3ML
0.5-2.5 (3) SOLUTION RESPIRATORY (INHALATION) 4 TIMES DAILY
Qty: 1 | Refills: 0 | Status: ACTIVE | COMMUNITY
Start: 2025-09-10

## 2025-09-12 ENCOUNTER — APPOINTMENT (OUTPATIENT)
Dept: INFUSION THERAPY | Facility: HOSPITAL | Age: 70
End: 2025-09-12

## 2025-09-15 PROBLEM — E43 SEVERE MALNUTRITION: Status: ACTIVE | Noted: 2025-09-15

## 2025-09-15 PROBLEM — R14.0 DISTENDED ABDOMEN: Status: ACTIVE | Noted: 2025-01-01
